# Patient Record
Sex: FEMALE | Race: WHITE | NOT HISPANIC OR LATINO | Employment: OTHER | URBAN - METROPOLITAN AREA
[De-identification: names, ages, dates, MRNs, and addresses within clinical notes are randomized per-mention and may not be internally consistent; named-entity substitution may affect disease eponyms.]

---

## 2017-12-05 NOTE — PROGRESS NOTES
"  SUBJECTIVE:   Caroline Pride is a 37 year old female who presents to clinic today for the following health issues:    Abnormal Mood Symptoms  Onset: \"all my life\"    Description:   Depression: no  Anxiety: YES    Accompanying Signs & Symptoms:  Still participating in activities that you used to enjoy: no  Fatigue: no  Irritability: YES  Difficulty concentrating: no  Changes in appetite: no  Problems with sleep: no  Heart racing/beating fast : no  Thoughts of hurting yourself or others: none    History:   Recent stress: YES- new baby born in January and wife gave birth  Prior depression hospitalization: None  Family history of depression: YES  Family history of anxiety: YES    Precipitating factors:   Alcohol/drug use: no  Mother-in-law moving in downstairs, wife and her have new baby and lot of work, 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 Sister and dad on medications that have helped them. One sister and one brother and sister is good support      Doesn't want pap or HPV testing , had intercourse once with one man when 17 and have had all normal paps, monogamous with wife for many years      Problem list and histories reviewed & adjusted, as indicated.  Additional history: as documented    Patient Active Problem List   Diagnosis     Other procreative management counseling and advice     GLORIA (generalized anxiety disorder)     History reviewed. No pertinent surgical history.    Social History   Substance Use Topics     Smoking status: Light Tobacco Smoker     Smokeless tobacco: Never Used " "    Alcohol use Yes      Comment: occasional     Family History   Problem Relation Age of Onset     Thyroid Disease Mother      Depression Father      Anxiety Disorder Father      Hypertension Sister      Anxiety Disorder Sister      Thyroid Disease Sister          No Known Allergies  BP Readings from Last 3 Encounters:   12/06/17 135/86   03/13/15 130/90    Wt Readings from Last 3 Encounters:   12/06/17 200 lb (90.7 kg)   03/13/15 189 lb (85.7 kg)                  Labs reviewed in EPIC        Reviewed and updated as needed this visit by clinical staffTobacco  Med Hx  Surg Hx  Fam Hx  Soc Hx      Reviewed and updated as needed this visit by Provider         ROS:  Constitutional, HEENT, cardiovascular, pulmonary, GI, , musculoskeletal, neuro, skin, endocrine and psych systems are negative, except as otherwise noted.      OBJECTIVE:   /86 (BP Location: Left arm, Patient Position: Chair, Cuff Size: Adult Regular)  Pulse 86  Temp 97.3  F (36.3  C) (Oral)  Ht 5' 7.9\" (1.725 m)  Wt 200 lb (90.7 kg)  SpO2 96%  BMI 30.5 kg/m2  Body mass index is 30.5 kg/(m^2).  GENERAL: healthy, alert and no distress  EYES: Eyes grossly normal to inspection, PERRL and conjunctivae and sclerae normal  NECK: no adenopathy, no asymmetry, masses, or scars and thyroid normal to palpation  RESP: lungs clear to auscultation - no rales, rhonchi or wheezes  CV: regular rate and rhythm, normal S1 S2, no S3 or S4, no murmur, click or rub, no peripheral edema and peripheral pulses strong  ABDOMEN: soft, nontender, no hepatosplenomegaly, no masses and bowel sounds normal  MS: no gross musculoskeletal defects noted, no edema  SKIN: no suspicious lesions or rashes  NEURO: Normal strength and tone, mentation intact and speech normal  PSYCH: MENTAL STATUS EXAM  Appearance: appropriate  Attitude: cooperative  Behavior: normal  Eye Contact: normal  Speech: normal  Orientation: oriented to person , place, time and situation  Mood: states her " mood has been anxious   Affect: Normal/bright, slightly anxious at times  Thought Process: clear     Diagnostic Test Results:  Results for orders placed or performed in visit on 12/06/17   Comprehensive metabolic panel   Result Value Ref Range    Sodium 141 133 - 144 mmol/L    Potassium 4.2 3.4 - 5.3 mmol/L    Chloride 108 94 - 109 mmol/L    Carbon Dioxide 24 20 - 32 mmol/L    Anion Gap 9 3 - 14 mmol/L    Glucose 112 (H) 70 - 99 mg/dL    Urea Nitrogen 13 7 - 30 mg/dL    Creatinine 0.68 0.52 - 1.04 mg/dL    GFR Estimate >90 >60 mL/min/1.7m2    GFR Estimate If Black >90 >60 mL/min/1.7m2    Calcium 9.0 8.5 - 10.1 mg/dL    Bilirubin Total 0.4 0.2 - 1.3 mg/dL    Albumin 3.7 3.4 - 5.0 g/dL    Protein Total 8.0 6.8 - 8.8 g/dL    Alkaline Phosphatase 52 40 - 150 U/L    ALT 30 0 - 50 U/L    AST 4 0 - 45 U/L   TSH with free T4 reflex   Result Value Ref Range    TSH 6.10 (H) 0.40 - 4.00 mU/L   Vitamin D Deficiency   Result Value Ref Range    Vitamin D Deficiency screening 18 (L) 20 - 75 ug/L   T4 free   Result Value Ref Range    T4 Free 1.01 0.76 - 1.46 ng/dL       ASSESSMENT/PLAN:         ICD-10-CM    1. GLORIA (generalized anxiety disorder) F41.1 escitalopram (LEXAPRO) 5 MG tablet     Comprehensive metabolic panel     TSH with free T4 reflex     Vitamin D Deficiency     T4 free   2. Establishing care with new doctor, encounter for Z76.89    anxiety new GLORIA vs situational with new baby her wife gave birth to 2 months ago, mother in law living in home, normal exam but has not yet had lab work to rule out other causes, will do today and follow up as indicated.    Discussed the pathophysiology of anxiety episodes and the various symptoms seen associated with anxiety episodes.  Discussed possible triggers including fatigue, depression, stress, and chemicals such as alcohol, caffeine and certain drugs.  Discussed the treatment including an aerobic exercise program, adequate rest, and both rescue meds and maintenance meds. Opted to  start lexapro as this worked well for her sister, she declines counseling at this time. No need for rescue at this time    Follow up in 1 month or earlier if any concerns     Patient Instructions       Understanding Generalized Anxiety Disorder (GLORIA)  Anxiety can fill you with worry and fear. Sometimes anxiety is healthy. It alerts you to a potential threat and gets you to respond and take action. But for some people, anxiety gets so bad it causes problems in daily life. If you find yourself in a constant state of anxiety, you may have an anxiety disorder called generalized anxiety disorder (GLORIA). Speak with your healthcare provider or mental health professional to learn more. He or she can help.     What is generalized anxiety disorder?  With GLORIA, you might worry about money, your family and friends, work, or the world in general. You might not even be sure what you're anxious about. But whatever it is, you have an intense fear that the worst will happen. These feelings never really go away. In people age 65 and older, GLORIA is one of the most commonly diagnosed anxiety disorders.  Many times it occurs with depression. This constant worry affects your quality of life and makes it hard to function. GLORIA can cause physical symptoms, too.  What are common symptoms of generalized anxiety disorder?  People with GLORIA often think they have a physical illness. The disorder can cause symptoms, such as:    Muscle tension, especially in the neck and shoulders    Nausea and stomach problems    Frequent headaches    Feeling lightheaded    Restlessness, trouble sleeping    Feeling irritable and on edge all the time  How can generalized anxiety disorder be treated?  GLORIA can be treated with medicine or therapy (also called counseling), or both. Medicine helps to reduce symptoms, so you can continue with your daily routine. Therapy helps you understand the cause of your anxiety and learn how to manage it. Both forms of treatment help  you deal with problems that anxiety causes in your life. This helps you to be healthier and happier.  Date Last Reviewed: 5/1/2017 2000-2017 [employer]. [address] [place] [address], [place] [address]. All rights reserved. This information is not intended as a substitute for professional medical care. Always follow your healthcare professional's instructions.        Treating Anxiety Disorders with Therapy    If you have an anxiety disorder, you don t have to suffer anymore. Treatment is available. Therapy (also called counseling) is often a helpful treatment for anxiety disorders. With therapy, a specially trained professional (therapist) helps you face and learn to manage your anxiety. Therapy can be short-term or long-term depending on your needs. In some cases, medicine may also be prescribed with therapy. It may take time before you notice how much therapy is helping, but stick with it. With therapy, you can feel better.  Cognitive behavioral therapy (CBT)  Cognitive behavioral therapy (CBT) teaches you to manage anxiety. It does this by helping you understand how you think and act when you re anxious. Research has shown CBT to be a very effective treatment for anxiety disorders. How CBT is run is almost like a class. It involves homework and activities to build skills that teach you to cope with anxiety step by step. It can be done in a group or one-on-one, and often takes place for a set number of sessions. CBT has two main parts:    Cognitive therapy helps you identify the negative, irrational thoughts that occur with your anxiety. You ll learn to replace these with more positive, realistic thoughts.    Behavioral therapy helps you change how you react to anxiety. You ll learn coping skills and methods for relaxing to help you better deal with anxiety.  Other forms of therapy  Other therapy methods may work better for you than CBT. Or, you may move from CBT to another form of therapy as your  treatment needs change. This may mean meeting with a therapist by yourself or in a group. Therapy can also help you work through problems in your life, such as drug or alcohol dependence, that may be making your anxiety worse.  Getting better takes time  Therapy will help you feel better and teach you skills to help manage anxiety long term. But change doesn t happen right away. It takes a commitment from you. And treatment only works if you learn to face the causes of your anxiety. So, you might feel worse before you feel better. This can sometimes make it hard to stick with it. But remember: Therapy is a very effective treatment. The results will be well worth it.  Helping yourself  If anxiety is wearing you down, here are some things you can do to cope:    Check with your doctor and rule out any physical problems that may be causing the anxiety symptoms.    If an anxiety disorder is diagnosed, seek mental healthcare. This is an illness and it can respond to treatment. Most types of anxiety disorders will respond to talk therapy and medicine.    Educate yourself about anxiety disorders. Keep track of helpful online resources and books you can use during stressful periods.    Try stress management techniques such as meditation.    Consider online or in-person support groups.    Don t fight your feelings. Anxiety feeds itself. The more you worry about it, the worse it gets. Instead, try to identify what might have triggered your anxiety. Then try to put this threat in perspective.    Keep in mind that you can t control everything about a situation. Change what you can and let the rest take its course.    Exercise -- it s a great way to relieve tension and help your body feel relaxed.    Examine your life for stress, and try to find ways to reduce it.    Avoid caffeine and nicotine, which can make anxiety symptoms worse.    Fight the temptation to turn to alcohol or unprescribed drugs for relief. They only make  things worse in the long run.   Date Last Reviewed: 1/1/2017 2000-2017 [employer]. [address] [facility], [place] [address]. All rights reserved. This information is not intended as a substitute for professional medical care. Always follow your healthcare professional's instructions.            DANN Arce [facility]

## 2017-12-06 ENCOUNTER — OFFICE VISIT (OUTPATIENT)
Dept: FAMILY MEDICINE | Facility: CLINIC | Age: 37
End: 2017-12-06
Payer: COMMERCIAL

## 2017-12-06 VITALS
HEART RATE: 86 BPM | WEIGHT: 200 LBS | TEMPERATURE: 97.3 F | BODY MASS INDEX: 30.31 KG/M2 | DIASTOLIC BLOOD PRESSURE: 86 MMHG | HEIGHT: 68 IN | SYSTOLIC BLOOD PRESSURE: 135 MMHG | OXYGEN SATURATION: 96 %

## 2017-12-06 DIAGNOSIS — Z76.89 ESTABLISHING CARE WITH NEW DOCTOR, ENCOUNTER FOR: ICD-10-CM

## 2017-12-06 DIAGNOSIS — F41.1 GAD (GENERALIZED ANXIETY DISORDER): Primary | ICD-10-CM

## 2017-12-06 LAB
ALBUMIN SERPL-MCNC: 3.7 G/DL (ref 3.4–5)
ALP SERPL-CCNC: 52 U/L (ref 40–150)
ALT SERPL W P-5'-P-CCNC: 30 U/L (ref 0–50)
ANION GAP SERPL CALCULATED.3IONS-SCNC: 9 MMOL/L (ref 3–14)
AST SERPL W P-5'-P-CCNC: 4 U/L (ref 0–45)
BILIRUB SERPL-MCNC: 0.4 MG/DL (ref 0.2–1.3)
BUN SERPL-MCNC: 13 MG/DL (ref 7–30)
CALCIUM SERPL-MCNC: 9 MG/DL (ref 8.5–10.1)
CHLORIDE SERPL-SCNC: 108 MMOL/L (ref 94–109)
CO2 SERPL-SCNC: 24 MMOL/L (ref 20–32)
CREAT SERPL-MCNC: 0.68 MG/DL (ref 0.52–1.04)
GFR SERPL CREATININE-BSD FRML MDRD: >90 ML/MIN/1.7M2
GLUCOSE SERPL-MCNC: 112 MG/DL (ref 70–99)
POTASSIUM SERPL-SCNC: 4.2 MMOL/L (ref 3.4–5.3)
PROT SERPL-MCNC: 8 G/DL (ref 6.8–8.8)
SODIUM SERPL-SCNC: 141 MMOL/L (ref 133–144)
T4 FREE SERPL-MCNC: 1.01 NG/DL (ref 0.76–1.46)
TSH SERPL DL<=0.005 MIU/L-ACNC: 6.1 MU/L (ref 0.4–4)

## 2017-12-06 PROCEDURE — 82306 VITAMIN D 25 HYDROXY: CPT | Performed by: NURSE PRACTITIONER

## 2017-12-06 PROCEDURE — 36415 COLL VENOUS BLD VENIPUNCTURE: CPT | Performed by: NURSE PRACTITIONER

## 2017-12-06 PROCEDURE — 84443 ASSAY THYROID STIM HORMONE: CPT | Performed by: NURSE PRACTITIONER

## 2017-12-06 PROCEDURE — 80053 COMPREHEN METABOLIC PANEL: CPT | Performed by: NURSE PRACTITIONER

## 2017-12-06 PROCEDURE — 84439 ASSAY OF FREE THYROXINE: CPT | Performed by: NURSE PRACTITIONER

## 2017-12-06 PROCEDURE — 99203 OFFICE O/P NEW LOW 30 MIN: CPT | Performed by: NURSE PRACTITIONER

## 2017-12-06 RX ORDER — ESCITALOPRAM OXALATE 5 MG/1
5 TABLET ORAL DAILY
Qty: 60 TABLET | Refills: 1 | Status: SHIPPED | OUTPATIENT
Start: 2017-12-06 | End: 2018-01-09

## 2017-12-06 RX ORDER — METFORMIN HCL 500 MG
1000 TABLET, EXTENDED RELEASE 24 HR ORAL DAILY
COMMUNITY
Start: 2017-10-26 | End: 2019-10-08

## 2017-12-06 ASSESSMENT — ANXIETY QUESTIONNAIRES
3. WORRYING TOO MUCH ABOUT DIFFERENT THINGS: MORE THAN HALF THE DAYS
1. FEELING NERVOUS, ANXIOUS, OR ON EDGE: NEARLY EVERY DAY
7. FEELING AFRAID AS IF SOMETHING AWFUL MIGHT HAPPEN: SEVERAL DAYS
GAD7 TOTAL SCORE: 11
6. BECOMING EASILY ANNOYED OR IRRITABLE: MORE THAN HALF THE DAYS
2. NOT BEING ABLE TO STOP OR CONTROL WORRYING: MORE THAN HALF THE DAYS
5. BEING SO RESTLESS THAT IT IS HARD TO SIT STILL: NOT AT ALL

## 2017-12-06 ASSESSMENT — PATIENT HEALTH QUESTIONNAIRE - PHQ9
SUM OF ALL RESPONSES TO PHQ QUESTIONS 1-9: 5
5. POOR APPETITE OR OVEREATING: SEVERAL DAYS

## 2017-12-06 NOTE — NURSING NOTE
"Chief Complaint   Patient presents with     Anxiety       Initial /86 (BP Location: Left arm, Patient Position: Chair, Cuff Size: Adult Regular)  Pulse 86  Temp 97.3  F (36.3  C) (Oral)  Ht 5' 7.9\" (1.725 m)  Wt 200 lb (90.7 kg)  SpO2 96%  BMI 30.5 kg/m2 Estimated body mass index is 30.5 kg/(m^2) as calculated from the following:    Height as of this encounter: 5' 7.9\" (1.725 m).    Weight as of this encounter: 200 lb (90.7 kg).  Medication Reconciliation: complete     Jamaica Cloud CMA    "

## 2017-12-06 NOTE — PATIENT INSTRUCTIONS
Understanding Generalized Anxiety Disorder (GLORIA)  Anxiety can fill you with worry and fear. Sometimes anxiety is healthy. It alerts you to a potential threat and gets you to respond and take action. But for some people, anxiety gets so bad it causes problems in daily life. If you find yourself in a constant state of anxiety, you may have an anxiety disorder called generalized anxiety disorder (GLORIA). Speak with your healthcare provider or mental health professional to learn more. He or she can help.     What is generalized anxiety disorder?  With GLORIA, you might worry about money, your family and friends, work, or the world in general. You might not even be sure what you're anxious about. But whatever it is, you have an intense fear that the worst will happen. These feelings never really go away. In people age 65 and older, GLORIA is one of the most commonly diagnosed anxiety disorders.  Many times it occurs with depression. This constant worry affects your quality of life and makes it hard to function. GLORIA can cause physical symptoms, too.  What are common symptoms of generalized anxiety disorder?  People with GLORIA often think they have a physical illness. The disorder can cause symptoms, such as:    Muscle tension, especially in the neck and shoulders    Nausea and stomach problems    Frequent headaches    Feeling lightheaded    Restlessness, trouble sleeping    Feeling irritable and on edge all the time  How can generalized anxiety disorder be treated?  GLORIA can be treated with medicine or therapy (also called counseling), or both. Medicine helps to reduce symptoms, so you can continue with your daily routine. Therapy helps you understand the cause of your anxiety and learn how to manage it. Both forms of treatment help you deal with problems that anxiety causes in your life. This helps you to be healthier and happier.  Date Last Reviewed: 5/1/2017 2000-2017 [employer]. [address] [facility],  [place] [address]. All rights reserved. This information is not intended as a substitute for professional medical care. Always follow your healthcare professional's instructions.        Treating Anxiety Disorders with Therapy    If you have an anxiety disorder, you don t have to suffer anymore. Treatment is available. Therapy (also called counseling) is often a helpful treatment for anxiety disorders. With therapy, a specially trained professional (therapist) helps you face and learn to manage your anxiety. Therapy can be short-term or long-term depending on your needs. In some cases, medicine may also be prescribed with therapy. It may take time before you notice how much therapy is helping, but stick with it. With therapy, you can feel better.  Cognitive behavioral therapy (CBT)  Cognitive behavioral therapy (CBT) teaches you to manage anxiety. It does this by helping you understand how you think and act when you re anxious. Research has shown CBT to be a very effective treatment for anxiety disorders. How CBT is run is almost like a class. It involves homework and activities to build skills that teach you to cope with anxiety step by step. It can be done in a group or one-on-one, and often takes place for a set number of sessions. CBT has two main parts:    Cognitive therapy helps you identify the negative, irrational thoughts that occur with your anxiety. You ll learn to replace these with more positive, realistic thoughts.    Behavioral therapy helps you change how you react to anxiety. You ll learn coping skills and methods for relaxing to help you better deal with anxiety.  Other forms of therapy  Other therapy methods may work better for you than CBT. Or, you may move from CBT to another form of therapy as your treatment needs change. This may mean meeting with a therapist by yourself or in a group. Therapy can also help you work through problems in your life, such as drug or alcohol dependence, that may be  making your anxiety worse.  Getting better takes time  Therapy will help you feel better and teach you skills to help manage anxiety long term. But change doesn t happen right away. It takes a commitment from you. And treatment only works if you learn to face the causes of your anxiety. So, you might feel worse before you feel better. This can sometimes make it hard to stick with it. But remember: Therapy is a very effective treatment. The results will be well worth it.  Helping yourself  If anxiety is wearing you down, here are some things you can do to cope:    Check with your doctor and rule out any physical problems that may be causing the anxiety symptoms.    If an anxiety disorder is diagnosed, seek mental healthcare. This is an illness and it can respond to treatment. Most types of anxiety disorders will respond to talk therapy and medicine.    Educate yourself about anxiety disorders. Keep track of helpful online resources and books you can use during stressful periods.    Try stress management techniques such as meditation.    Consider online or in-person support groups.    Don t fight your feelings. Anxiety feeds itself. The more you worry about it, the worse it gets. Instead, try to identify what might have triggered your anxiety. Then try to put this threat in perspective.    Keep in mind that you can t control everything about a situation. Change what you can and let the rest take its course.    Exercise   it s a great way to relieve tension and help your body feel relaxed.    Examine your life for stress, and try to find ways to reduce it.    Avoid caffeine and nicotine, which can make anxiety symptoms worse.    Fight the temptation to turn to alcohol or unprescribed drugs for relief. They only make things worse in the long run.   Date Last Reviewed: 1/1/2017 2000-2017 [employer]. [address] [place] [address], [place] [address]. All rights reserved. This information is not intended as a  substitute for professional medical care. Always follow your healthcare professional's instructions.

## 2017-12-06 NOTE — MR AVS SNAPSHOT
After Visit Summary   12/6/2017    Caroline Pride    MRN: [id number]           Patient Information     Date Of Birth          1980        Visit Information        Provider Department      12/6/2017 7:40 AM Giovanna Mistry APRN [facility]        Today's Diagnoses     GLORIA (generalized anxiety disorder)    -  1    Establishing care with new doctor, encounter for          Care Instructions      Understanding Generalized Anxiety Disorder (GLORIA)  Anxiety can fill you with worry and fear. Sometimes anxiety is healthy. It alerts you to a potential threat and gets you to respond and take action. But for some people, anxiety gets so bad it causes problems in daily life. If you find yourself in a constant state of anxiety, you may have an anxiety disorder called generalized anxiety disorder (GLORIA). Speak with your healthcare provider or mental health professional to learn more. He or she can help.     What is generalized anxiety disorder?  With GLORIA, you might worry about money, your family and friends, work, or the world in general. You might not even be sure what you're anxious about. But whatever it is, you have an intense fear that the worst will happen. These feelings never really go away. In people age 65 and older, GLORIA is one of the most commonly diagnosed anxiety disorders.  Many times it occurs with depression. This constant worry affects your quality of life and makes it hard to function. GLORIA can cause physical symptoms, too.  What are common symptoms of generalized anxiety disorder?  People with GLORIA often think they have a physical illness. The disorder can cause symptoms, such as:    Muscle tension, especially in the neck and shoulders    Nausea and stomach problems    Frequent headaches    Feeling lightheaded    Restlessness, trouble sleeping    Feeling irritable and on edge all the time  How can generalized anxiety disorder be treated?  GLORIA can be treated with  medicine or therapy (also called counseling), or both. Medicine helps to reduce symptoms, so you can continue with your daily routine. Therapy helps you understand the cause of your anxiety and learn how to manage it. Both forms of treatment help you deal with problems that anxiety causes in your life. This helps you to be healthier and happier.  Date Last Reviewed: 5/1/2017 2000-2017 [employer]. [address] [place] [address], [place] [address]. All rights reserved. This information is not intended as a substitute for professional medical care. Always follow your healthcare professional's instructions.        Treating Anxiety Disorders with Therapy    If you have an anxiety disorder, you don t have to suffer anymore. Treatment is available. Therapy (also called counseling) is often a helpful treatment for anxiety disorders. With therapy, a specially trained professional (therapist) helps you face and learn to manage your anxiety. Therapy can be short-term or long-term depending on your needs. In some cases, medicine may also be prescribed with therapy. It may take time before you notice how much therapy is helping, but stick with it. With therapy, you can feel better.  Cognitive behavioral therapy (CBT)  Cognitive behavioral therapy (CBT) teaches you to manage anxiety. It does this by helping you understand how you think and act when you re anxious. Research has shown CBT to be a very effective treatment for anxiety disorders. How CBT is run is almost like a class. It involves homework and activities to build skills that teach you to cope with anxiety step by step. It can be done in a group or one-on-one, and often takes place for a set number of sessions. CBT has two main parts:    Cognitive therapy helps you identify the negative, irrational thoughts that occur with your anxiety. You ll learn to replace these with more positive, realistic thoughts.    Behavioral therapy helps you change how you react to  anxiety. You ll learn coping skills and methods for relaxing to help you better deal with anxiety.  Other forms of therapy  Other therapy methods may work better for you than CBT. Or, you may move from CBT to another form of therapy as your treatment needs change. This may mean meeting with a therapist by yourself or in a group. Therapy can also help you work through problems in your life, such as drug or alcohol dependence, that may be making your anxiety worse.  Getting better takes time  Therapy will help you feel better and teach you skills to help manage anxiety long term. But change doesn t happen right away. It takes a commitment from you. And treatment only works if you learn to face the causes of your anxiety. So, you might feel worse before you feel better. This can sometimes make it hard to stick with it. But remember: Therapy is a very effective treatment. The results will be well worth it.  Helping yourself  If anxiety is wearing you down, here are some things you can do to cope:    Check with your doctor and rule out any physical problems that may be causing the anxiety symptoms.    If an anxiety disorder is diagnosed, seek mental healthcare. This is an illness and it can respond to treatment. Most types of anxiety disorders will respond to talk therapy and medicine.    Educate yourself about anxiety disorders. Keep track of helpful online resources and books you can use during stressful periods.    Try stress management techniques such as meditation.    Consider online or in-person support groups.    Don t fight your feelings. Anxiety feeds itself. The more you worry about it, the worse it gets. Instead, try to identify what might have triggered your anxiety. Then try to put this threat in perspective.    Keep in mind that you can t control everything about a situation. Change what you can and let the rest take its course.    Exercise -- it s a great way to relieve tension and help your body feel  relaxed.    Examine your life for stress, and try to find ways to reduce it.    Avoid caffeine and nicotine, which can make anxiety symptoms worse.    Fight the temptation to turn to alcohol or unprescribed drugs for relief. They only make things worse in the long run.   Date Last Reviewed: 1/1/2017 2000-2017 [employer]. [address] [place] [address], [place] [address]. All rights reserved. This information is not intended as a substitute for professional medical care. Always follow your healthcare professional's instructions.                Follow-ups after your visit        Your next 10 appointments already scheduled     Jan 09, 2018  4:40 PM CST   Office Visit with DANN Arce CNP   [facility] ([facility])    [address] [place] [id number] [phone]           Bring a current list of meds and any records pertaining to this visit. For Physicals, please bring immunization records and any forms needing to be filled out. Please arrive 10 minutes early to complete paperwork.              Who to contact     If you have questions or need follow up information about today's clinic visit or your schedule please contact [facility] directly at [phone].  Normal or non-critical lab and imaging results will be communicated to you by MyChart, letter or phone within 4 business days after the clinic has received the results. If you do not hear from us within 7 days, please contact the clinic through [employer] or phone. If you have a critical or abnormal lab result, we will notify you by phone as soon as possible.  Submit refill requests through [employer] or call your pharmacy and they will forward the refill request to us. Please allow 3 business days for your refill to be completed.          Additional Information About Your Visit        [employer] Information     [employer] gives you secure access to your electronic health record.  "If you see a primary care provider, you can also send messages to your care team and make appointments. If you have questions, please call your primary care clinic.  If you do not have a primary care provider, please call [phone] and they will assist you.        Care EveryWhere ID     This is your Care EveryWhere ID. This could be used by other organizations to access your [facility] medical records  [id number]        Your Vitals Were     Pulse Temperature Height Pulse Oximetry BMI (Body Mass Index)       86 97.3  F (36.3  C) (Oral) 5' 7.9\" (1.725 m) 96% 30.5 kg/m2        Blood Pressure from Last 3 Encounters:   12/06/17 135/86   03/13/15 130/90    Weight from Last 3 Encounters:   12/06/17 200 lb (90.7 kg)   03/13/15 189 lb (85.7 kg)              We Performed the Following     Comprehensive metabolic panel     T4 free     TSH with free T4 reflex     Vitamin D Deficiency          Today's Medication Changes          These changes are accurate as of: 12/6/17 11:59 PM.  If you have any questions, ask your nurse or doctor.               Start taking these medicines.        Dose/Directions    escitalopram 5 MG tablet   Commonly known as:  LEXAPRO   Used for:  GLORIA (generalized anxiety disorder)        Dose:  5 mg   Take 1 tablet (5 mg) by mouth daily And if tolerated after 1 week increase to 10 mg po daily   Quantity:  60 tablet   Refills:  1            Where to get your medicines      These medications were sent to [facility] [address]  [address] [place] [address] [address] [facility] [address]     Phone:  [phone]     escitalopram 5 MG tablet                Primary Care Provider Office Phone # Fax #    DANN Arce [phone]       [address]  [facility] [address]        Equal Access to Services     EUGENIO MACE AH: Kae Salgado, justino hanley, qaybta luciano carroll, karely banuelos " ladebbi hubbard. So [facility] [phone].    ATENCIÓN: Si habla nav, tiene a gomes disposición servicios gratuitos de asistencia lingüística. Rehan al [phone].    We comply with applicable federal civil rights laws and [place] laws. We do not discriminate on the basis of race, color, national origin, age, disability, sex, sexual orientation, or gender identity.            Thank you!     Thank you for choosing [facility]  for your care. Our goal is always to provide you with excellent care. Hearing back from our patients is one way we can continue to improve our services. Please take a few minutes to complete the written survey that you may receive in the mail after your visit with us. Thank you!             Your Updated Medication List - Protect others around you: Learn how to safely use, store and throw away your medicines at www.disposemymeds.org.          This list is accurate as of: 12/6/17 11:59 PM.  Always use your most recent med list.                   Brand Name Dispense Instructions for use Diagnosis    escitalopram 5 MG tablet    LEXAPRO    60 tablet    Take 1 tablet (5 mg) by mouth daily And if tolerated after 1 week increase to 10 mg po daily    GLORIA (generalized anxiety disorder)       metFORMIN 500 MG 24 hr tablet    GLUCOPHAGE-XR     Take 1,000 mg by mouth daily        prenatal multivitamin plus iron 27-0.8 MG Tabs per tablet      Take 1 tablet by mouth daily

## 2017-12-06 NOTE — Clinical Note
I'm just curious if this needs a time statement when ruling out other causes of depression or anxiety? Thanks

## 2017-12-07 LAB — DEPRECATED CALCIDIOL+CALCIFEROL SERPL-MC: 18 UG/L (ref 20–75)

## 2017-12-07 ASSESSMENT — ANXIETY QUESTIONNAIRES: GAD7 TOTAL SCORE: 11

## 2017-12-24 ENCOUNTER — HEALTH MAINTENANCE LETTER (OUTPATIENT)
Age: 37
End: 2017-12-24

## 2018-01-09 ENCOUNTER — MYC MEDICAL ADVICE (OUTPATIENT)
Dept: FAMILY MEDICINE | Facility: CLINIC | Age: 38
End: 2018-01-09

## 2018-01-09 ENCOUNTER — OFFICE VISIT (OUTPATIENT)
Dept: FAMILY MEDICINE | Facility: CLINIC | Age: 38
End: 2018-01-09
Payer: COMMERCIAL

## 2018-01-09 VITALS
BODY MASS INDEX: 30.19 KG/M2 | HEART RATE: 82 BPM | SYSTOLIC BLOOD PRESSURE: 130 MMHG | WEIGHT: 199.2 LBS | DIASTOLIC BLOOD PRESSURE: 86 MMHG | TEMPERATURE: 98.1 F | OXYGEN SATURATION: 98 % | HEIGHT: 68 IN

## 2018-01-09 DIAGNOSIS — E03.8 SUBCLINICAL HYPOTHYROIDISM: ICD-10-CM

## 2018-01-09 DIAGNOSIS — F41.1 GAD (GENERALIZED ANXIETY DISORDER): ICD-10-CM

## 2018-01-09 DIAGNOSIS — F41.1 GAD (GENERALIZED ANXIETY DISORDER): Primary | ICD-10-CM

## 2018-01-09 DIAGNOSIS — E55.9 VITAMIN D DEFICIENCY: ICD-10-CM

## 2018-01-09 PROCEDURE — 99213 OFFICE O/P EST LOW 20 MIN: CPT | Performed by: NURSE PRACTITIONER

## 2018-01-09 RX ORDER — ERGOCALCIFEROL 1.25 MG/1
50000 CAPSULE, LIQUID FILLED ORAL
Qty: 8 CAPSULE | Refills: 0 | Status: SHIPPED | OUTPATIENT
Start: 2018-01-09 | End: 2018-02-28

## 2018-01-09 NOTE — MR AVS SNAPSHOT
After Visit Summary   1/9/2018    Caroline Pried    MRN: [id number]           Patient Information     Date Of Birth          1980        Visit Information        Provider Department      1/9/2018 4:40 PM Giovanna Mistry APRN [facility]        Today's Diagnoses     GLORIA (generalized anxiety disorder)    -  1    Vitamin D deficiency        Subclinical hypothyroidism          Care Instructions      If you ever do wish to start counseling just let mek now and I'll refer you for this, otherwise consider exercise or weight loss groups in the community or at gym to get moving more each day  We will recheck how this is working for you and do lab work in about 3 months to include thyroid testing   Start the high dose Vitamin D weekly for 8 weeks, then do 5000 units daily afterward that is finished.   Hypothyroidism       You have hypothyroidism. This means your thyroid gland is not making enough thyroid hormone. This hormone is vital to body growth and metabolism. If you don t make enough, many body processes slow down. This can cause symptoms throughout the body. Hypothyroidism can range from mild to severe. The most severe form is called myxedema.  There are a number of causes of hypothyroidism. A common cause is Hashimoto s disease. This disease causes the body s own immune system to attack the thyroid gland. When you have certain treatments, such as surgery to remove the thyroid gland, this can also cause hypothyroidism.  Symptoms of hypothyroidism can include:    Fatigue    Trouble concentrating or thinking clearly; forgetfulness    Dry skin    Hair loss    Weight gain    Low tolerance to cold    Constipation    Depression    Personality changes    Tingling or prickling of the hands or feet    Heavy, absent, or irregular periods (women only)  Older adults may sometimes have other symptoms. These can include:    Muscle aches and weakness    Confusion    Incontinence (unable  to control urine or stool)    Trouble moving around    Falling  Treatment for hypothyroidism involves taking thyroid hormone pills daily. These pills replace the hormone your thyroid doesn t make. You will likely need to take a daily pill for the rest of your life. Tips for taking this medicine are given below.  Home care  Tips for taking your medicine    Take your thyroid hormone pills as prescribed by your healthcare provider. This is most often 1 pill a day on an empty stomach. Use a pillbox labeled with the days of the week. This will help you remember to take your pill each day.    Don t take products that contain iron and calcium or antacids within 4 hours of taking your thyroid hormone pills.    Don t take other medicines with your thyroid hormone pill without checking with your provider first.    Tell your provider if you have any side effects from your medicines that bother you.    Never change the dosage or stop taking your thyroid pills without talking to your provider first.  General care    Always talk with your provider before trying other medicines or treatments for your thyroid problem.    If you see other healthcare providers, be sure to let them know about your thyroid problem.  Follow-up care  See your healthcare provider for checkups as advised. You may need regular tests to check the level of thyroid hormone in your blood.  When to seek medical advice  Call your healthcare provider right away if any of these occur:    New symptoms develop    Symptoms return, continue, or worsen even after treatment    Extreme fatigue    Puffy hands, face, or feet    Fast or irregular heartbeat    Confusion  Call 911  Call 911 right away if any of these occur:    Fainting    Chest pain    Shortness of breath or trouble breathing  Date Last Reviewed: 8/24/2015 2000-2017 [employer]. [address] [place] [address], [place] [address]. All rights reserved. This information is not intended as a substitute for  professional medical care. Always follow your healthcare professional's instructions.        Vitamin D  Does this test have other names?  25-hydroxyvitamin D (25-high-DROX-ee-VIE-tuh-min D), 25(OH)D  What is this test?  Vitamin D is mainly found in fortified dairy foods, juice, breakfast cereal, and certain fish. This vitamin plays many roles in the body. But because it helps the body absorb calcium from foods and supplements, it's particularly important for bone health. Vitamin D has many additional roles in the body.  Vitamin D comes in several forms. When ultraviolet light, such as sunlight, hits your skin, it creates vitamin D3. D2 is used to fortify dairy foods. Both of these are further processed by your liver and kidneys into a form your body can use. Most tests for vitamin D check the level of a form circulating in the body called 25-hydroxyvitamin D, also called 25(OH)D.   Why do I need this test?  Vitamin D testing has become much more popular in recent years. Your healthcare provider may check your vitamin D levels to find out if you have any risks to bone health. These might be:    Low calcium    Soft bones caused by low vitamin D or problems using it (osteomalacia)    Osteopenia    Osteoporosis    Rickets, in children  You may also need this test if you are at risk for low vitamin D levels. Risks include:    Being an older adult    Having difficulty absorbing fat from your diet    Having chronic kidney disease    Have dark skin pigmentation    Being a  baby  Vitamin D has many effects in the body. You may need this test to help your provider diagnose or treat:    Problems with the parathyroid gland    Cancer    Autoimmune diseases such as multiple sclerosis and Crohn's disease    Psoriasis    Asthma    Weakness or falls    What other tests might I have along with this test?  A healthcare provider may also want to check your parathyroid hormone levels and your calcium levels.   What do my test  results mean?  A result for a lab test may be affected by many things, including the method the laboratory uses to do the test. If your test results are different from the normal value, you may not have a problem. To learn what the results mean for you, talk with your healthcare provider.  Children and adults need more than 30 nanograms per milliliter (ng/ml) of vitamin D. The optimal level of 25(OH)D is usually between 30 and 60 ng/mL. Recommended daily amounts range from 400 to 800 international units (IU) per day based on your age.  Levels lower than normal can mean you are:    Not making enough vitamin D on your own    Not getting enough vitamin D in your diet    Not absorbing vitamin D from your food as you should  Lower levels may also mean that your body is not converting the vitamin as it should. This might be because of kidney or liver disease.  Above-normal levels may be a sign that you're taking too much in supplement form.   How is this test done?  The test requires a blood sample, which is drawn through a needle from a vein in your arm.  Does this test pose any risks?  Taking a blood sample with a needle carries risks that include bleeding, infection, bruising, and a sense of lightheadedness. When the needle pricks your arm, you may feel a slight stinging sensation or pain. Afterward, the site may be slightly sore.   What might affect my test results?  The amount of time you spend in the sunlight, your diet, and whether you take vitamin D in supplement form can affect your vitamin D levels. Ask your healthcare provider if any health conditions you have or medicines you take could affect your results.  How do I get ready for this test?  Tell your healthcare provider if you take vitamin D supplements. Also be sure your provider knows about all medicines, herbs, vitamins, and supplements you are taking. This includes medicines that don't need a prescription and any illicit drugs you may use.     [id number]  "[employer]. [address] [place] [address], [place] [address]. All rights reserved. This information is not intended as a substitute for professional medical care. Always follow your healthcare professional's instructions.                Follow-ups after your visit        Who to contact     If you have questions or need follow up information about today's clinic visit or your schedule please contact [facility] directly at [phone].  Normal or non-critical lab and imaging results will be communicated to you by [employer], letter or phone within 4 business days after the clinic has received the results. If you do not hear from us within 7 days, please contact the clinic through [employer] or phone. If you have a critical or abnormal lab result, we will notify you by phone as soon as possible.  Submit refill requests through [employer] or call your pharmacy and they will forward the refill request to us. Please allow 3 business days for your refill to be completed.          Additional Information About Your Visit        [employer] Information     [employer] gives you secure access to your electronic health record. If you see a primary care provider, you can also send messages to your care team and make appointments. If you have questions, please call your primary care clinic.  If you do not have a primary care provider, please call [phone] and they will assist you.        Care EveryWhere ID     This is your Care EveryWhere ID. This could be used by other organizations to access your [place] medical records  [id number]        Your Vitals Were     Pulse Temperature Height Pulse Oximetry BMI (Body Mass Index)       82 98.1  F (36.7  C) (Oral) 5' 7.72\" (1.72 m) 98% 30.54 kg/m2        Blood Pressure from Last 3 Encounters:   01/09/18 130/86   12/06/17 135/86   03/13/15 130/90    Weight from Last 3 Encounters:   01/09/18 199 lb 3.2 oz (90.4 kg)   12/06/17 200 lb (90.7 kg)   03/13/15 189 lb (85.7 kg)    "           Today, you had the following     No orders found for display         Today's Medication Changes          These changes are accurate as of: 1/9/18 11:59 PM.  If you have any questions, ask your nurse or doctor.               Start taking these medicines.        Dose/Directions    vitamin D 95717 UNIT capsule   Commonly known as:  ERGOCALCIFEROL   Used for:  Vitamin D deficiency   Started by:  Giovanna Mistry APRN CNP        Dose:  88792 Units   Take 1 capsule (50,000 Units) by mouth every 7 days for 8 doses   Quantity:  8 capsule   Refills:  0         These medicines have changed or have updated prescriptions.        Dose/Directions    escitalopram 10 MG tablet   Commonly known as:  LEXAPRO   This may have changed:    - medication strength  - how much to take  - additional instructions   Used for:  GLORIA (generalized anxiety disorder)   Changed by:  Giovanna Mistry APRN CNP        Dose:  10 mg   Take 1 tablet (10 mg) by mouth daily   Quantity:  90 tablet   Refills:  1            Where to get your medicines      These medications were sent to [facility] [address] [place] [address]., [address] [address]     Phone:  [phone]     escitalopram 10 MG tablet    vitamin D 59982 UNIT capsule                Primary Care Provider Office Phone # Fax #    DANN Arce [phone]       [address]  [facility] [address]        Equal Access to Services     EUGENIO MACE AH: [place] suo Sojavon, waaxda luqadaha, qaybta kaalmada glen, karely hubbard. So [facility] [phone].    ATENCIÓN: Si habla español, tiene a gomes disposición servicios gratuitos de asistencia lingüística. Rehan al [phone].    We comply with applicable federal civil rights laws and [place] laws. We do not discriminate on the basis of race, color, national origin, age, disability, sex, sexual  orientation, or gender identity.            Thank you!     Thank you for choosing [facility]  for your care. Our goal is always to provide you with excellent care. Hearing back from our patients is one way we can continue to improve our services. Please take a few minutes to complete the written survey that you may receive in the mail after your visit with us. Thank you!             Your Updated Medication List - Protect others around you: Learn how to safely use, store and throw away your medicines at www.disposemymeds.org.          This list is accurate as of: 1/9/18 11:59 PM.  Always use your most recent med list.                   Brand Name Dispense Instructions for use Diagnosis    escitalopram 10 MG tablet    LEXAPRO    90 tablet    Take 1 tablet (10 mg) by mouth daily    GLORIA (generalized anxiety disorder)       metFORMIN 500 MG 24 hr tablet    GLUCOPHAGE-XR     Take 1,000 mg by mouth daily        prenatal multivitamin plus iron 27-0.8 MG Tabs per tablet      Take 1 tablet by mouth daily        vitamin D 24213 UNIT capsule    ERGOCALCIFEROL    8 capsule    Take 1 capsule (50,000 Units) by mouth every 7 days for 8 doses    Vitamin D deficiency

## 2018-01-09 NOTE — PROGRESS NOTES
SUBJECTIVE:   Caroline Pride is a 37 year old female who presents to clinic today for the following health issues:    Anxiety Follow-Up    Status since last visit: Improved     Other associated symptoms: really tired and felt crazy taking it the first time     Complicating factors:   Significant life event: No   Current substance abuse: None  Depression symptoms: No  GLORIA-7 SCORE 12/6/2017   Total Score 11       GAD7      Amount of exercise or physical activity: None    Problems taking medications regularly: No    Medication side effects: none    Diet: regular (no restrictions)    Questions/Concerns: none  Tired but after a couple weeks felt improvement   Does [employer] with baby at home and can be difficult to do both some days  Partner and sister supportive     TSH elevated, normal t4, has been overweight but thinks it's from not exercising and feeling sluggish to start. Mom had thyroid issues from mother getting treatment for cancer and she was made from her mom's egg so thinking this may have affected her as well     Vit D Def - Hasn't started Vit D high dose yet, taking 1000 units some days    Problem list and histories reviewed & adjusted, as indicated.  Additional history: as documented    Patient Active Problem List   Diagnosis     Other procreative management counseling and advice     GLORIA (generalized anxiety disorder)     History reviewed. No pertinent surgical history.    Social History   Substance Use Topics     Smoking status: Light Tobacco Smoker     Smokeless tobacco: Never Used     Alcohol use Yes      Comment: occasional     Family History   Problem Relation Age of Onset     Thyroid Disease Mother      Depression Father      Anxiety Disorder Father      Hypertension Sister      Anxiety Disorder Sister      Thyroid Disease Sister          Current Outpatient Prescriptions   Medication Sig Dispense Refill     vitamin D (ERGOCALCIFEROL) 97803 UNIT capsule Take 1 capsule (50,000 Units) by mouth every 7  "days for 8 doses 8 capsule 0     metFORMIN (GLUCOPHAGE-XR) 500 MG 24 hr tablet Take 1,000 mg by mouth daily       escitalopram (LEXAPRO) 5 MG tablet Take 1 tablet (5 mg) by mouth daily And if tolerated after 1 week increase to 10 mg po daily 60 tablet 1     Prenatal Vit-Fe Fumarate-FA (PRENATAL MULTIVITAMIN  PLUS IRON) 27-0.8 MG TABS Take 1 tablet by mouth daily       No Known Allergies  BP Readings from Last 3 Encounters:   01/09/18 130/86   12/06/17 135/86   03/13/15 130/90    Wt Readings from Last 3 Encounters:   01/09/18 199 lb 3.2 oz (90.4 kg)   12/06/17 200 lb (90.7 kg)   03/13/15 189 lb (85.7 kg)                  Labs reviewed in EPIC        Reviewed and updated as needed this visit by clinical staff     Reviewed and updated as needed this visit by Provider         ROS:  Constitutional, HEENT, cardiovascular, pulmonary, GI, , musculoskeletal, neuro, skin, endocrine and psych systems are negative, except as otherwise noted.      OBJECTIVE:   /86  Pulse 82  Temp 98.1  F (36.7  C) (Oral)  Ht 5' 7.72\" (1.72 m)  Wt 199 lb 3.2 oz (90.4 kg)  SpO2 98%  BMI 30.54 kg/m2  Body mass index is 30.54 kg/(m^2).  GENERAL: healthy, alert and no distress  EYES: Eyes grossly normal to inspection, PERRL and conjunctivae and sclerae normal  NECK: no adenopathy, no asymmetry, masses, or scars and thyroid normal to palpation  RESP: lungs clear to auscultation - no rales, rhonchi or wheezes  CV: regular rate and rhythm, normal S1 S2, no S3 or S4, no murmur, click or rub, no peripheral edema and peripheral pulses strong  ABDOMEN: soft, nontender, no hepatosplenomegaly, no masses and bowel sounds normal  MS: no gross musculoskeletal defects noted, no edema  SKIN: no suspicious lesions or rashes  NEURO: Normal strength and tone, mentation intact and speech normal  PSYCH: mentation appears normal, affect normal/bright    Diagnostic Test Results:  none     ASSESSMENT/PLAN:         ICD-10-CM    1. GLORIA (generalized anxiety " disorder) F41.1 escitalopram (LEXAPRO) 10 MG tablet   2. Vitamin D deficiency E55.9 vitamin D (ERGOCALCIFEROL) 69370 UNIT capsule   3. Subclinical hypothyroidism E03.9    reports starting the SSRI helped with her anxiety, fatigue continues and appears not very motivated to continue medication, explored this today and she does find it helpful so will continue at the 10 mg dose. Consider increasing, although she is not interested at this time. Denies depression, declines counseling    Will recheck thyroid and consider rechecking Vit D at next lab draw, see below. If fatigue is worsening Return to Clinic and would check sooner    Patient Instructions     If you ever do wish to start counseling just let mek now and I'll refer you for this, otherwise consider exercise or weight loss groups in the community or at gym to get moving more each day  We will recheck how this is working for you and do lab work in about 3 months to include thyroid testing   Start the high dose Vitamin D weekly for 8 weeks, then do 5000 units daily afterward that is finished.   Hypothyroidism       You have hypothyroidism. This means your thyroid gland is not making enough thyroid hormone. This hormone is vital to body growth and metabolism. If you don t make enough, many body processes slow down. This can cause symptoms throughout the body. Hypothyroidism can range from mild to severe. The most severe form is called myxedema.  There are a number of causes of hypothyroidism. A common cause is Hashimoto s disease. This disease causes the body s own immune system to attack the thyroid gland. When you have certain treatments, such as surgery to remove the thyroid gland, this can also cause hypothyroidism.  Symptoms of hypothyroidism can include:    Fatigue    Trouble concentrating or thinking clearly; forgetfulness    Dry skin    Hair loss    Weight gain    Low tolerance to cold    Constipation    Depression    Personality changes    Tingling or  prickling of the hands or feet    Heavy, absent, or irregular periods (women only)  Older adults may sometimes have other symptoms. These can include:    Muscle aches and weakness    Confusion    Incontinence (unable to control urine or stool)    Trouble moving around    Falling  Treatment for hypothyroidism involves taking thyroid hormone pills daily. These pills replace the hormone your thyroid doesn t make. You will likely need to take a daily pill for the rest of your life. Tips for taking this medicine are given below.  Home care  Tips for taking your medicine    Take your thyroid hormone pills as prescribed by your healthcare provider. This is most often 1 pill a day on an empty stomach. Use a pillbox labeled with the days of the week. This will help you remember to take your pill each day.    Don t take products that contain iron and calcium or antacids within 4 hours of taking your thyroid hormone pills.    Don t take other medicines with your thyroid hormone pill without checking with your provider first.    Tell your provider if you have any side effects from your medicines that bother you.    Never change the dosage or stop taking your thyroid pills without talking to your provider first.  General care    Always talk with your provider before trying other medicines or treatments for your thyroid problem.    If you see other healthcare providers, be sure to let them know about your thyroid problem.  Follow-up care  See your healthcare provider for checkups as advised. You may need regular tests to check the level of thyroid hormone in your blood.  When to seek medical advice  Call your healthcare provider right away if any of these occur:    New symptoms develop    Symptoms return, continue, or worsen even after treatment    Extreme fatigue    Puffy hands, face, or feet    Fast or irregular heartbeat    Confusion  Call 911  Call 911 right away if any of these occur:    Fainting    Chest pain    Shortness  of breath or trouble breathing  Date Last Reviewed: 8/24/2015 2000-2017 [employer]. [address] [place] [address], [place] [address]. All rights reserved. This information is not intended as a substitute for professional medical care. Always follow your healthcare professional's instructions.        Vitamin D  Does this test have other names?  25-hydroxyvitamin D (25-high-DROX-ee-VIE-tuh-min D), 25(OH)D  What is this test?  Vitamin D is mainly found in fortified dairy foods, juice, breakfast cereal, and certain fish. This vitamin plays many roles in the body. But because it helps the body absorb calcium from foods and supplements, it's particularly important for bone health. Vitamin D has many additional roles in the body.  Vitamin D comes in several forms. When ultraviolet light, such as sunlight, hits your skin, it creates vitamin D3. D2 is used to fortify dairy foods. Both of these are further processed by your liver and kidneys into a form your body can use. Most tests for vitamin D check the level of a form circulating in the body called 25-hydroxyvitamin D, also called 25(OH)D.   Why do I need this test?  Vitamin D testing has become much more popular in recent years. Your healthcare provider may check your vitamin D levels to find out if you have any risks to bone health. These might be:    Low calcium    Soft bones caused by low vitamin D or problems using it (osteomalacia)    Osteopenia    Osteoporosis    Rickets, in children  You may also need this test if you are at risk for low vitamin D levels. Risks include:    Being an older adult    Having difficulty absorbing fat from your diet    Having chronic kidney disease    Have dark skin pigmentation    Being a  baby  Vitamin D has many effects in the body. You may need this test to help your provider diagnose or treat:    Problems with the parathyroid gland    Cancer    Autoimmune diseases such as multiple sclerosis and Crohn's  disease    Psoriasis    Asthma    Weakness or falls    What other tests might I have along with this test?  A healthcare provider may also want to check your parathyroid hormone levels and your calcium levels.   What do my test results mean?  A result for a lab test may be affected by many things, including the method the laboratory uses to do the test. If your test results are different from the normal value, you may not have a problem. To learn what the results mean for you, talk with your healthcare provider.  Children and adults need more than 30 nanograms per milliliter (ng/ml) of vitamin D. The optimal level of 25(OH)D is usually between 30 and 60 ng/mL. Recommended daily amounts range from 400 to 800 international units (IU) per day based on your age.  Levels lower than normal can mean you are:    Not making enough vitamin D on your own    Not getting enough vitamin D in your diet    Not absorbing vitamin D from your food as you should  Lower levels may also mean that your body is not converting the vitamin as it should. This might be because of kidney or liver disease.  Above-normal levels may be a sign that you're taking too much in supplement form.   How is this test done?  The test requires a blood sample, which is drawn through a needle from a vein in your arm.  Does this test pose any risks?  Taking a blood sample with a needle carries risks that include bleeding, infection, bruising, and a sense of lightheadedness. When the needle pricks your arm, you may feel a slight stinging sensation or pain. Afterward, the site may be slightly sore.   What might affect my test results?  The amount of time you spend in the sunlight, your diet, and whether you take vitamin D in supplement form can affect your vitamin D levels. Ask your healthcare provider if any health conditions you have or medicines you take could affect your results.  How do I get ready for this test?  Tell your healthcare provider if you take  vitamin D supplements. Also be sure your provider knows about all medicines, herbs, vitamins, and supplements you are taking. This includes medicines that don't need a prescription and any illicit drugs you may use.     1090-9316 [employer]. [address] [place] [address], [place] [address]. All rights reserved. This information is not intended as a substitute for professional medical care. Always follow your healthcare professional's instructions.            DANN Arce [facility]

## 2018-01-09 NOTE — NURSING NOTE
"Chief Complaint   Patient presents with     Anxiety       Initial /86  Pulse 82  Temp 98.1  F (36.7  C) (Oral)  Ht 5' 7.72\" (1.72 m)  Wt 199 lb 3.2 oz (90.4 kg)  SpO2 98%  BMI 30.54 kg/m2 Estimated body mass index is 30.54 kg/(m^2) as calculated from the following:    Height as of this encounter: 5' 7.72\" (1.72 m).    Weight as of this encounter: 199 lb 3.2 oz (90.4 kg).  Medication Reconciliation: complete       Alban Cole MA      "

## 2018-01-09 NOTE — PATIENT INSTRUCTIONS
If you ever do wish to start counseling just let mek now and I'll refer you for this, otherwise consider exercise or weight loss groups in the community or at gym to get moving more each day  We will recheck how this is working for you and do lab work in about 3 months to include thyroid testing   Start the high dose Vitamin D weekly for 8 weeks, then do 5000 units daily afterward that is finished.   Hypothyroidism       You have hypothyroidism. This means your thyroid gland is not making enough thyroid hormone. This hormone is vital to body growth and metabolism. If you don t make enough, many body processes slow down. This can cause symptoms throughout the body. Hypothyroidism can range from mild to severe. The most severe form is called myxedema.  There are a number of causes of hypothyroidism. A common cause is Hashimoto s disease. This disease causes the body s own immune system to attack the thyroid gland. When you have certain treatments, such as surgery to remove the thyroid gland, this can also cause hypothyroidism.  Symptoms of hypothyroidism can include:    Fatigue    Trouble concentrating or thinking clearly; forgetfulness    Dry skin    Hair loss    Weight gain    Low tolerance to cold    Constipation    Depression    Personality changes    Tingling or prickling of the hands or feet    Heavy, absent, or irregular periods (women only)  Older adults may sometimes have other symptoms. These can include:    Muscle aches and weakness    Confusion    Incontinence (unable to control urine or stool)    Trouble moving around    Falling  Treatment for hypothyroidism involves taking thyroid hormone pills daily. These pills replace the hormone your thyroid doesn t make. You will likely need to take a daily pill for the rest of your life. Tips for taking this medicine are given below.  Home care  Tips for taking your medicine    Take your thyroid hormone pills as prescribed by your healthcare provider. This is  most often 1 pill a day on an empty stomach. Use a pillbox labeled with the days of the week. This will help you remember to take your pill each day.    Don t take products that contain iron and calcium or antacids within 4 hours of taking your thyroid hormone pills.    Don t take other medicines with your thyroid hormone pill without checking with your provider first.    Tell your provider if you have any side effects from your medicines that bother you.    Never change the dosage or stop taking your thyroid pills without talking to your provider first.  General care    Always talk with your provider before trying other medicines or treatments for your thyroid problem.    If you see other healthcare providers, be sure to let them know about your thyroid problem.  Follow-up care  See your healthcare provider for checkups as advised. You may need regular tests to check the level of thyroid hormone in your blood.  When to seek medical advice  Call your healthcare provider right away if any of these occur:    New symptoms develop    Symptoms return, continue, or worsen even after treatment    Extreme fatigue    Puffy hands, face, or feet    Fast or irregular heartbeat    Confusion  Call 911  Call 911 right away if any of these occur:    Fainting    Chest pain    Shortness of breath or trouble breathing  Date Last Reviewed: 8/24/2015 2000-2017 [employer]. [address] [place] [address], [place] [address]. All rights reserved. This information is not intended as a substitute for professional medical care. Always follow your healthcare professional's instructions.        Vitamin D  Does this test have other names?  25-hydroxyvitamin D (25-high-DROX-ee-VIE-tuh-min D), 25(OH)D  What is this test?  Vitamin D is mainly found in fortified dairy foods, juice, breakfast cereal, and certain fish. This vitamin plays many roles in the body. But because it helps the body absorb calcium from foods and supplements, it's  particularly important for bone health. Vitamin D has many additional roles in the body.  Vitamin D comes in several forms. When ultraviolet light, such as sunlight, hits your skin, it creates vitamin D3. D2 is used to fortify dairy foods. Both of these are further processed by your liver and kidneys into a form your body can use. Most tests for vitamin D check the level of a form circulating in the body called 25-hydroxyvitamin D, also called 25(OH)D.   Why do I need this test?  Vitamin D testing has become much more popular in recent years. Your healthcare provider may check your vitamin D levels to find out if you have any risks to bone health. These might be:    Low calcium    Soft bones caused by low vitamin D or problems using it (osteomalacia)    Osteopenia    Osteoporosis    Rickets, in children  You may also need this test if you are at risk for low vitamin D levels. Risks include:    Being an older adult    Having difficulty absorbing fat from your diet    Having chronic kidney disease    Have dark skin pigmentation    Being a  baby  Vitamin D has many effects in the body. You may need this test to help your provider diagnose or treat:    Problems with the parathyroid gland    Cancer    Autoimmune diseases such as multiple sclerosis and Crohn's disease    Psoriasis    Asthma    Weakness or falls    What other tests might I have along with this test?  A healthcare provider may also want to check your parathyroid hormone levels and your calcium levels.   What do my test results mean?  A result for a lab test may be affected by many things, including the method the laboratory uses to do the test. If your test results are different from the normal value, you may not have a problem. To learn what the results mean for you, talk with your healthcare provider.  Children and adults need more than 30 nanograms per milliliter (ng/ml) of vitamin D. The optimal level of 25(OH)D is usually between 30 and 60  ng/mL. Recommended daily amounts range from 400 to 800 international units (IU) per day based on your age.  Levels lower than normal can mean you are:    Not making enough vitamin D on your own    Not getting enough vitamin D in your diet    Not absorbing vitamin D from your food as you should  Lower levels may also mean that your body is not converting the vitamin as it should. This might be because of kidney or liver disease.  Above-normal levels may be a sign that you're taking too much in supplement form.   How is this test done?  The test requires a blood sample, which is drawn through a needle from a vein in your arm.  Does this test pose any risks?  Taking a blood sample with a needle carries risks that include bleeding, infection, bruising, and a sense of lightheadedness. When the needle pricks your arm, you may feel a slight stinging sensation or pain. Afterward, the site may be slightly sore.   What might affect my test results?  The amount of time you spend in the sunlight, your diet, and whether you take vitamin D in supplement form can affect your vitamin D levels. Ask your healthcare provider if any health conditions you have or medicines you take could affect your results.  How do I get ready for this test?  Tell your healthcare provider if you take vitamin D supplements. Also be sure your provider knows about all medicines, herbs, vitamins, and supplements you are taking. This includes medicines that don't need a prescription and any illicit drugs you may use.     7701-7573 [employer]. [address] [place] [address], [place] [address]. All rights reserved. This information is not intended as a substitute for professional medical care. Always follow your healthcare professional's instructions.

## 2018-01-16 RX ORDER — ESCITALOPRAM OXALATE 10 MG/1
10 TABLET ORAL DAILY
Qty: 90 TABLET | Refills: 1 | Status: SHIPPED | OUTPATIENT
Start: 2018-01-16 | End: 2018-01-30

## 2018-01-30 RX ORDER — ESCITALOPRAM OXALATE 5 MG/1
5 TABLET ORAL DAILY
Qty: 90 TABLET | Refills: 1 | Status: SHIPPED | OUTPATIENT
Start: 2018-01-30 | End: 2018-07-13

## 2018-07-13 ENCOUNTER — MYC REFILL (OUTPATIENT)
Dept: FAMILY MEDICINE | Facility: CLINIC | Age: 38
End: 2018-07-13

## 2018-07-13 DIAGNOSIS — F41.1 GAD (GENERALIZED ANXIETY DISORDER): ICD-10-CM

## 2018-07-13 RX ORDER — ESCITALOPRAM OXALATE 5 MG/1
5 TABLET ORAL DAILY
Qty: 90 TABLET | Refills: 1 | Status: SHIPPED | OUTPATIENT
Start: 2018-07-13 | End: 2019-10-08

## 2018-07-13 NOTE — TELEPHONE ENCOUNTER
Prescription approved per [facility] Refill Protocol.    Nancy Garcia RN  [facility]

## 2018-07-13 NOTE — TELEPHONE ENCOUNTER
Message from [employer]:  Original authorizing provider: DANN Arce CNP would like a refill of the following medications:  escitalopram (LEXAPRO) 5 MG tablet [DANN Arce CNP]    Preferred pharmacy: [address]    Comment:

## 2018-10-09 ENCOUNTER — TELEPHONE (OUTPATIENT)
Dept: FAMILY MEDICINE | Facility: CLINIC | Age: 38
End: 2018-10-09

## 2018-10-09 NOTE — TELEPHONE ENCOUNTER
Panel Management Review      Patient has the following on her problem list: None      Composite cancer screening  Chart review shows that this patient is due/due soon for the following Pap Smear  Summary:    Patient is due/failing the following:   PAP    Action needed:   Patient needs office visit for pap.    Type of outreach:    Sent [employer] message.    Questions for provider review:    none                                                                                                                                    Kaitlynn Mendez MA

## 2019-02-04 ENCOUNTER — TELEPHONE (OUTPATIENT)
Dept: FAMILY MEDICINE | Facility: CLINIC | Age: 39
End: 2019-02-04

## 2019-02-04 NOTE — LETTER
[facility]  [address] [place] [address] [address]  [facility] [address]  Phone: [phone]  Fax: [phone]              February 21, 2019      Caroline Pride  [address]  [facility] [address]        Dear Caroline     In order to ensure we are providing the best quality care, we have reviewed your chart and see that you are due for:     1. Pap smear     Please call the clinic at your earliest convenience to schedule an appointment.  If you have completed these please contact our office via phone at [phone] or [facility] to update our records.  We would like to know the date (approximately month and year), the result, and ideally where the procedure was performed.     Thank you for trusting us with your health care.       Sincerely,     Care Team for Giovanna Mistry NP

## 2019-02-04 NOTE — TELEPHONE ENCOUNTER
Panel Management Review      Patient has the following on her problem list: None      Composite cancer screening  Chart review shows that this patient is due/due soon for the following Pap Smear  Summary:    Patient is due/failing the following:   PAP    Action needed:   Patient needs office visit for pap smear.    Type of outreach:    Sent [employer] message.    Questions for provider review:    None                                                                                                                                    Kaitlynn Mendez MA       Chart routed to  .

## 2019-09-16 ENCOUNTER — TELEPHONE (OUTPATIENT)
Dept: OBGYN | Facility: CLINIC | Age: 39
End: 2019-09-16

## 2019-09-16 NOTE — TELEPHONE ENCOUNTER
Called and left message to call clinic back.   If call is returned, nurse intake visit can be scheduled.  Ruthann Mistry,        ----- Message from Gómez Clark sent at 9/15/2019 10:10 PM CDT -----  Patient is 7 weeks pregnant and would like to get her first prenatal appointment scheduled.

## 2019-10-08 ENCOUNTER — TRANSCRIBE ORDERS (OUTPATIENT)
Dept: MATERNAL FETAL MEDICINE | Facility: CLINIC | Age: 39
End: 2019-10-08

## 2019-10-08 ENCOUNTER — PRENATAL OFFICE VISIT (OUTPATIENT)
Dept: NURSING | Facility: CLINIC | Age: 39
End: 2019-10-08
Payer: COMMERCIAL

## 2019-10-08 VITALS
TEMPERATURE: 98 F | DIASTOLIC BLOOD PRESSURE: 81 MMHG | BODY MASS INDEX: 30.14 KG/M2 | HEART RATE: 84 BPM | SYSTOLIC BLOOD PRESSURE: 127 MMHG | WEIGHT: 198.9 LBS | HEIGHT: 68 IN

## 2019-10-08 DIAGNOSIS — O26.90 PREGNANCY RELATED CONDITION, ANTEPARTUM: Primary | ICD-10-CM

## 2019-10-08 DIAGNOSIS — O09.519 AMA (ADVANCED MATERNAL AGE) PRIMIGRAVIDA 35+: Primary | ICD-10-CM

## 2019-10-08 DIAGNOSIS — Z23 NEED FOR TDAP VACCINATION: ICD-10-CM

## 2019-10-08 LAB
ABO + RH BLD: NORMAL
ABO + RH BLD: NORMAL
ALBUMIN UR-MCNC: NEGATIVE MG/DL
APPEARANCE UR: CLEAR
BILIRUB UR QL STRIP: NEGATIVE
BLD GP AB SCN SERPL QL: NORMAL
BLOOD BANK CMNT PATIENT-IMP: NORMAL
COLOR UR AUTO: YELLOW
ERYTHROCYTE [DISTWIDTH] IN BLOOD BY AUTOMATED COUNT: 12.7 % (ref 10–15)
GLUCOSE UR STRIP-MCNC: NEGATIVE MG/DL
HCT VFR BLD AUTO: 34.9 % (ref 35–47)
HGB BLD-MCNC: 11.9 G/DL (ref 11.7–15.7)
HGB UR QL STRIP: NEGATIVE
KETONES UR STRIP-MCNC: NEGATIVE MG/DL
LEUKOCYTE ESTERASE UR QL STRIP: NEGATIVE
MCH RBC QN AUTO: 30.4 PG (ref 26.5–33)
MCHC RBC AUTO-ENTMCNC: 34.1 G/DL (ref 31.5–36.5)
MCV RBC AUTO: 89 FL (ref 78–100)
NITRATE UR QL: NEGATIVE
PH UR STRIP: 6 PH (ref 5–7)
PLATELET # BLD AUTO: 309 10E9/L (ref 150–450)
RBC # BLD AUTO: 3.92 10E12/L (ref 3.8–5.2)
SOURCE: NORMAL
SP GR UR STRIP: 1.02 (ref 1–1.03)
SPECIMEN EXP DATE BLD: NORMAL
UROBILINOGEN UR STRIP-ACNC: 0.2 EU/DL (ref 0.2–1)
WBC # BLD AUTO: 10.1 10E9/L (ref 4–11)

## 2019-10-08 PROCEDURE — 87086 URINE CULTURE/COLONY COUNT: CPT | Performed by: ADVANCED PRACTICE MIDWIFE

## 2019-10-08 PROCEDURE — 87389 HIV-1 AG W/HIV-1&-2 AB AG IA: CPT | Performed by: ADVANCED PRACTICE MIDWIFE

## 2019-10-08 PROCEDURE — 99207 ZZC NO CHARGE NURSE ONLY: CPT

## 2019-10-08 PROCEDURE — 86900 BLOOD TYPING SEROLOGIC ABO: CPT | Performed by: ADVANCED PRACTICE MIDWIFE

## 2019-10-08 PROCEDURE — 85027 COMPLETE CBC AUTOMATED: CPT | Performed by: ADVANCED PRACTICE MIDWIFE

## 2019-10-08 PROCEDURE — 86780 TREPONEMA PALLIDUM: CPT | Performed by: ADVANCED PRACTICE MIDWIFE

## 2019-10-08 PROCEDURE — 81003 URINALYSIS AUTO W/O SCOPE: CPT | Performed by: ADVANCED PRACTICE MIDWIFE

## 2019-10-08 PROCEDURE — 86901 BLOOD TYPING SEROLOGIC RH(D): CPT | Performed by: ADVANCED PRACTICE MIDWIFE

## 2019-10-08 PROCEDURE — 84443 ASSAY THYROID STIM HORMONE: CPT | Performed by: ADVANCED PRACTICE MIDWIFE

## 2019-10-08 PROCEDURE — 87340 HEPATITIS B SURFACE AG IA: CPT | Performed by: ADVANCED PRACTICE MIDWIFE

## 2019-10-08 PROCEDURE — 86850 RBC ANTIBODY SCREEN: CPT | Performed by: ADVANCED PRACTICE MIDWIFE

## 2019-10-08 PROCEDURE — 82306 VITAMIN D 25 HYDROXY: CPT | Performed by: ADVANCED PRACTICE MIDWIFE

## 2019-10-08 PROCEDURE — 36415 COLL VENOUS BLD VENIPUNCTURE: CPT | Performed by: ADVANCED PRACTICE MIDWIFE

## 2019-10-08 PROCEDURE — 86762 RUBELLA ANTIBODY: CPT | Performed by: ADVANCED PRACTICE MIDWIFE

## 2019-10-08 SDOH — SOCIAL STABILITY: SOCIAL INSECURITY
WITHIN THE LAST YEAR, HAVE YOU BEEN KICKED, HIT, SLAPPED, OR OTHERWISE PHYSICALLY HURT BY YOUR PARTNER OR EX-PARTNER?: NO

## 2019-10-08 SDOH — SOCIAL STABILITY: SOCIAL INSECURITY: WITHIN THE LAST YEAR, HAVE YOU BEEN AFRAID OF YOUR PARTNER OR EX-PARTNER?: NO

## 2019-10-08 SDOH — SOCIAL STABILITY: SOCIAL INSECURITY
WITHIN THE LAST YEAR, HAVE TO BEEN RAPED OR FORCED TO HAVE ANY KIND OF SEXUAL ACTIVITY BY YOUR PARTNER OR EX-PARTNER?: NO

## 2019-10-08 SDOH — SOCIAL STABILITY: SOCIAL INSECURITY: WITHIN THE LAST YEAR, HAVE YOU BEEN HUMILIATED OR EMOTIONALLY ABUSED IN OTHER WAYS BY YOUR PARTNER OR EX-PARTNER?: NO

## 2019-10-08 ASSESSMENT — MIFFLIN-ST. JEOR: SCORE: 1621.73

## 2019-10-08 NOTE — PROGRESS NOTES
Important Information for Provider:     Patient presents for new ob teaching and labs, first pregnancy,AMA. Ordered first trimester screening. Handouts reviewed and given. Recommended B6, Unisom for nausea.  TSH /Vitamin Ddrawn today with NOB labs.    Prenatal OB Questionnaire  Patient supplied answers from flow sheet for:  Prenatal OB Questionnaire.  Past Medical History  Diabetes?: No  Hypertension : No  Heart disease, mitral valve prolapse or rheumatic fever?: No  An autoimmune disease such as lupus or rheumatoid arthritis?: No  Kidney disease or urinary tract infection?: No  Epilepsy, seizures or spells?: No  Migraine headaches?: No  A stroke or loss of function or sensation?: No  Any other neurological problems?: No  Have you ever been treated for depression?: No  Are you having problems with crying spells or loss of self-esteem?: No  Have you ever required psychiatric care?: No  Have you ever had hepatitis, liver disease or jaundice?: No  Have you been treated for blood clots in your veins, deep vein thromosis, inflammation in the veins, thrombosis, phlebitis, pulmonary embolism or varicosities?: No  Have you had excessive bleeding after surgery or dental work?: No  Do you bleed more than other women after a cut or scratch?: No  Do you have a history of anemia?: (!) Yes  Have you ever had thyroid problems or taken thyroid medication?: (!) Yes  Abnormal result, sublingual hypothyroidism   Do you have any endocrine problems?: No  Have you ever been in a major accident or suffered serious trauma?: No  Within the last year, has anyone hit, slapped, kicked or otherwise hurt you?: No  In the last year, has anyone forced you to have sex when you didn't want to?: No    Past Medical History 2   Have you ever received a blood transfusion?: No  Would you refuse a blood transfusion if a doctor judged it to be medically necessary?: No   If you answered Yes, would you rather die than receive a blood transfusion?: No  If you  answered Yes, is this for Baptism reasons?: No  Does anyone in your home smoke?: No  Do you use tobacco products?: No  Do you drink beer, wine or hard liquor?: No  Do you use any of the following: marijuana, speed, cocaine, heroin, hallucinogens or other drugs?: No   Is your blood type Rh negative?: No  Have you ever had abnormal antibodies in your blood?: No  Have you ever had asthma?: No  Have you ever had tuberculosis?: No  Do you have any allergies to drugs or over-the-counter medications?: No  Allergies: Dust Mites, Aspartame, Ethanol, Venlafaxine, Hydrochloride, Sertraline: No  Have you had any breast problems?: No  Have you ever ?: No  Have you had any gynecological surgical procedures such as cervical conization, a LEEP procedure, laser treatment, cryosurgery of the cervix or a dilation and curettage, etc?: No  Have you ever had any other surgical procedures?: No  Have you been hospitalized for a nonsurgical reason excluding normal delivery?: No  Have you ever had any anesthetic complications?: No  Have you ever had an abnormal pap smear?: No    Past Medical History (Continued)  Do you have a history of abnormalities of the uterus?: No  Did your mother take ALIN or any other hormones when she was pregnant with you?: No  Did it take you more than a year to become pregnant?: No  Have you ever been evaluated or treated for infertility?: IUI 8/06/19 , PCOS  Is there a history of medical problems in your family, which you feel may be important to this pregnancy?: No  Do you have any other problems we have not asked about which you feel may be important to this pregnancy?: No    Symptoms since last menstrual period  Do you have any of the following symptoms: abdominal pain, blood in stools or urine, chest pain, shortness of breath, coughing or vomiting up blood, your heart racing or skipping beats, nausea and vomiting, pain on urination or vaginal discharge or bleed: (!) Yes  Will the patient be 35 years  old or older at the time of delivery?: (!) Yes    Has the patient, baby's father or anyone in either family had:  Thalassemia (Italian, Greek, Mediterranean or  background only) and an MCV result less than 80?: No  Neural tube defect such as meningomyelocele, spina bifida or anencephaly?: No  Congenital heart defect?: No  Down's Syndrome?: No  Yaya-Sachs disease (Mandaen, Cajun, Lithuanian-[place])?: No  Sickle cell disease or trait ()?: No  Hemophilia or other inherited problems of blood?: No  Muscular dystrophy?: No  Cystic fibrosis?: No  Hughes's chorea?: No  Mental retardation/autism?: No  If yes, was the person tested for fragile X?: No  Any other inherited genetic or chromosomal disorder?: No  Maternal metabolic disorder (e.g Insulin-dependent diabetes, PKU)?: No  A child with birth defects not listed above?: No  Recurrent pregnancy loss or stillbirth?: No   Has the patient had any medications/street drugs/alcohol since her last menstrual period?: No  Does the patient or baby's father have any other genetic risks?: No    Infection History   Do you object to being tested for Hepatitis B?: No  Do you object to being tested for HIV?: No   Do you feel that you are at high risk for coming in contact with the AIDS virus?: No  Have you ever been treated for tuberculosis?: No  Have you ever had a positive skin test for tuberculosis?: No  Do you live with someone who has tuberculosis?: No  Have you ever been exposed to tuberculosis?: No  Do you have genital herpes?: No  Does your partner have genital herpes?: No  Have you had a viral illness since your last period?: cold symptoms  Have you ever had gonorrhea, chlamydia, syphilis, venereal warts, trichomoniasis, pelvic inflammatory disease or any other sexually transmitted disease?: No  Do you know if you are a genital group B streptococcus carrier?: No  Have you had chicken pox/varicella?: (!) Yes   Have you been vaccinated against chicken Pox?: No  Have you  had any other infectious diseases?: cold sores      Allergies as of 10/8/2019:    Allergies as of 10/08/2019     (No Known Allergies)       Current medications are:  Current Outpatient Medications   Medication Sig Dispense Refill     escitalopram (LEXAPRO) 5 MG tablet Take 1 tablet (5 mg) by mouth daily 90 tablet 1     metFORMIN (GLUCOPHAGE-XR) 500 MG 24 hr tablet Take 1,000 mg by mouth daily       Prenatal Vit-Fe Fumarate-FA (PRENATAL MULTIVITAMIN  PLUS IRON) 27-0.8 MG TABS Take 1 tablet by mouth daily           Early ultrasound screening tool:    Does patient have irregular periods?  No  Did patient use hormonal birth control in the three months prior to positive urine pregnancy test? No  Is the patient breastfeeding?  No  Is the patient 10 weeks or greater at time of education visit?  No

## 2019-10-09 LAB
BACTERIA SPEC CULT: NORMAL
DEPRECATED CALCIDIOL+CALCIFEROL SERPL-MC: 24 UG/L (ref 20–75)
HBV SURFACE AG SERPL QL IA: NONREACTIVE
HIV 1+2 AB+HIV1 P24 AG SERPL QL IA: NONREACTIVE
RUBV IGG SERPL IA-ACNC: 44 IU/ML
SPECIMEN SOURCE: NORMAL
T PALLIDUM AB SER QL: NONREACTIVE
TSH SERPL DL<=0.005 MIU/L-ACNC: 1.72 MU/L (ref 0.4–4)

## 2019-10-22 ENCOUNTER — PRENATAL OFFICE VISIT (OUTPATIENT)
Dept: MIDWIFE SERVICES | Facility: CLINIC | Age: 39
End: 2019-10-22
Payer: COMMERCIAL

## 2019-10-22 VITALS
WEIGHT: 201.2 LBS | HEART RATE: 90 BPM | BODY MASS INDEX: 30.82 KG/M2 | SYSTOLIC BLOOD PRESSURE: 116 MMHG | DIASTOLIC BLOOD PRESSURE: 79 MMHG

## 2019-10-22 DIAGNOSIS — O09.511 AMA (ADVANCED MATERNAL AGE) PRIMIGRAVIDA 35+, FIRST TRIMESTER: Primary | ICD-10-CM

## 2019-10-22 DIAGNOSIS — Z23 NEED FOR PROPHYLACTIC VACCINATION AND INOCULATION AGAINST INFLUENZA: ICD-10-CM

## 2019-10-22 LAB — HBA1C MFR BLD: 5 % (ref 0–5.6)

## 2019-10-22 PROCEDURE — 36415 COLL VENOUS BLD VENIPUNCTURE: CPT | Performed by: ADVANCED PRACTICE MIDWIFE

## 2019-10-22 PROCEDURE — 83036 HEMOGLOBIN GLYCOSYLATED A1C: CPT | Performed by: ADVANCED PRACTICE MIDWIFE

## 2019-10-22 PROCEDURE — 90686 IIV4 VACC NO PRSV 0.5 ML IM: CPT | Performed by: ADVANCED PRACTICE MIDWIFE

## 2019-10-22 PROCEDURE — 99207 ZZC FIRST OB VISIT: CPT | Performed by: ADVANCED PRACTICE MIDWIFE

## 2019-10-22 PROCEDURE — 90471 IMMUNIZATION ADMIN: CPT | Performed by: ADVANCED PRACTICE MIDWIFE

## 2019-10-22 NOTE — NURSING NOTE
"Chief Complaint   Patient presents with     Prenatal Care     11+0     Imm/Inj     Flu Shot       Initial /79   Pulse 90   Wt 91.3 kg (201 lb 3.2 oz)   LMP 2019   BMI 30.82 kg/m   Estimated body mass index is 30.82 kg/m  as calculated from the following:    Height as of 10/8/19: 1.721 m (5' 7.75\").    Weight as of this encounter: 91.3 kg (201 lb 3.2 oz).  BP completed using cuff size: regular    Questioned patient about current smoking habits.  Pt. quit smoking some time ago.          The following HM Due: pap smear      The following patient reported/Care Every where data was sent to:  P ABSTRACT QUALITY INITIATIVES [[id number]]        patient has appointment for today  Emily Adame                "

## 2019-10-22 NOTE — PROGRESS NOTES
"11w0d   Caroline Pride is a 39 year old who presents to the clinic for an new ob visit. She is not a previous CNM patient. This is their second child, but her wife, Mila, carried their first pregnancy. Their son, Geovany, is 2 years old. Caroline has some anxiety about her age and PCOS, and how those issues may affect the health of her baby. She is definitely interested in genetic screening. She also verbalizes concern about \"pre-diabetes\", as she was told that can be related to PCOS. She has been feeling a bit depressed lately, and per Mila, has struggled with anxiety in the past. Since becoming pregnant, her primary symptom has been fatigue.  Estimated Date of Delivery: May 12, 2020 is calculated from Patient's last menstrual period was 08/06/2019.     She has not had bleeding since her LMP.   She has had mild nausea, better now. Weigh loss has not occurred.   This was a planned pregnancy.   Wife is involved,  Mila   OTHER CONCERNS: Advanced maternal age     INFECTION HISTORY  HIV: no  Hepatitis B: no  Hepatitis C: no  Syphilis:  no  Tuberculosis: no   PPD- no  Herpes self: yes-cold sores  Herpes partner:  no  Chlamydia:  no  Gonorrhea:  no  HPV: no  BV:  no  Trichomonis:  no  Chicken Pox:  YES-had disease as a child  ====================================================  GENETIC SCREENING  Genetic screening reviewed. High Risk? Yes-AMA >40  ====================================================  PERSONAL/SOCIAL HISTORY  Lives lives with their family.  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  HX OF ABUSE: no  =====================================================   REVIEW OF SYSTEMS  CONSTITUTIONAL: NEGATIVE for fever, chills  INTEGUMENTARY/SKIN: mole on her back  EYES: NEGATIVE for vision changes   RESP: NEGATIVE for significant cough or SOB  BREAST: NEGATIVE for masses, tenderness or discharge  CV: NEGATIVE for chest pain, palpitations   GI: POSITIVE for constipation and nausea and NEGATIVE " for weight loss  : NEGATIVE for frequency, dysuria, or hematuria   female: NEGATIVE  MUSCULOSKELETAL: NEGATIVE for significant arthralgias or myalgia  NEURO: NEGATIVE for weakness, dizziness or paresthesias or headache  ENDOCRINE: NEGATIVE for temperature intolerance, skin/hair changes  PSYCHIATRIC: slight anxiety and depressed mood; stable  ====================================================    PHYSICAL EXAM:  /79   Pulse 90   Wt 91.3 kg (201 lb 3.2 oz)   LMP 2019   BMI 30.82 kg/m    BMI- Body mass index is 30.82 kg/m .,     RECOMMENDED WEIGHT GAIN: 15-25 lbs.  PHQ9- Today's Depression Rating was No Value exists for the : HP#PHQ9  GENERAL:  Pleasant pregnant female, alert, well groomed.   SKIN:  Warm and dry, without lesions or rashes  HEAD: Symmetrical features.  EYES:  PERRLA,   MOUTH:  Buccal mucosa pink, moist without lesions.    NECK:  Thyroid without enlargement and nodules.  Lymph nodes not palpable.   LUNGS:  Clear to auscultation.  HEART:  RRR without murmur.  ABDOMEN: Soft without masses , tenderness or organomegaly.  No CVA tenderness. No scars noted.     MUSCULOSKELETAL:  Full range of motion  EXTREMITIES:  No edema. No significant varicosities.     =========================================  ASSESSMENT:  11w0d,   (O09.511) AMA (advanced maternal age) primigravida 35+, first trimester  (primary encounter diagnosis)  Plan: Hemoglobin A1c    (Z23) Need for prophylactic vaccination and inoculation against influenza  Plan: INFLUENZA VACCINE IM > 6 MONTHS VALENT IIV4         [91330], Vaccine Administration, Initial         [61082]    PREGNANCY AT RISK? Yes-AMA  ==========================================  PLAN:  Instructed on use of triage nurse line and contacting the on call CNM after hours for an urgent need such as fever, vagina bleeding, bladder or vaginal infection, rupture of membranes,  or term labor.    Discussed the indications, uses for and false positives  for quad screen, nuchal translucency and fetal survey ultrasound at 18-20 weeks gestation. They are interested in genetic screening, M consultation already ordered.  Instructed on best evidence for: weight gain for her BMI for pregnancy; healthy diet and foods to avoid; exercise and activity during pregnancy;avoiding exposure to toxoplasmosis; and maintenance of a generally healthy lifestyle.   Discussed the harms, benefits, side effects and alternative therapies for current prescribed and OTC medications.  Return to care in 4 weeks.    Irwin Britt CNM

## 2019-10-28 ENCOUNTER — PRE VISIT (OUTPATIENT)
Dept: MATERNAL FETAL MEDICINE | Facility: CLINIC | Age: 39
End: 2019-10-28

## 2019-10-30 ENCOUNTER — HOSPITAL ENCOUNTER (OUTPATIENT)
Dept: ULTRASOUND IMAGING | Facility: CLINIC | Age: 39
Discharge: HOME | End: 2019-10-30
Attending: ADVANCED PRACTICE MIDWIFE | Admitting: ADVANCED PRACTICE MIDWIFE
Payer: COMMERCIAL

## 2019-10-30 ENCOUNTER — OFFICE VISIT (OUTPATIENT)
Dept: MATERNAL FETAL MEDICINE | Facility: CLINIC | Age: 39
End: 2019-10-30
Attending: ADVANCED PRACTICE MIDWIFE
Payer: COMMERCIAL

## 2019-10-30 DIAGNOSIS — O26.90 PREGNANCY RELATED CONDITION, ANTEPARTUM: ICD-10-CM

## 2019-10-30 DIAGNOSIS — O09.511 SUPERVISION OF ELDERLY PRIMIGRAVIDA IN FIRST TRIMESTER: Primary | ICD-10-CM

## 2019-10-30 DIAGNOSIS — O09.529 ANTEPARTUM MULTIGRAVIDA OF ADVANCED MATERNAL AGE: Primary | ICD-10-CM

## 2019-10-30 DIAGNOSIS — O09.511 SUPERVISION OF ELDERLY PRIMIGRAVIDA IN FIRST TRIMESTER: ICD-10-CM

## 2019-10-30 PROCEDURE — 96040 ZZH GENETIC COUNSELING, EACH 30 MINUTES: CPT | Mod: ZF | Performed by: GENETIC COUNSELOR, MS

## 2019-10-30 PROCEDURE — 36415 COLL VENOUS BLD VENIPUNCTURE: CPT | Performed by: OBSTETRICS & GYNECOLOGY

## 2019-10-30 PROCEDURE — 40000791 ZZHCL STATISTIC VERIFI PRENATAL TRISOMY 21,18,13: Performed by: OBSTETRICS & GYNECOLOGY

## 2019-10-30 PROCEDURE — 76813 OB US NUCHAL MEAS 1 GEST: CPT

## 2019-10-30 NOTE — PROGRESS NOTES
"Please see \"Imaging\" tab under \"Chart Review\" for details of today's US.    Unique Covington, DO    "

## 2019-10-30 NOTE — PROGRESS NOTES
[facility]  Genetic Counseling Consult    Patient: Caroline Pride YOB: 1980   Date of Service: 10/30/19      Caroline Pride was seen at [facility] for genetic consultation to discuss the options for screening and testing for fetal chromosome abnormalities.  The indication for genetic counseling is advanced maternal age. She was accompanied to today's visit by her partner, Mila.       Impression/Plan:   1.  Caroline had an ultrasound and blood draw for NIPT (Innatal test through [employer]).  Results are expected within 7-10 days, and will be available in [employer].  We will contact her to discuss the results, and a copy will be forwarded to the office of the referring OB provider. She requested a detailed voice message be left at [phone] if she is unavailable. They do not wish to know fetal sex information.    2.  Maternal serum AFP (single marker screen) is recommended after 15 weeks to screen for open neural tube defects. A quad screen should not be performed.    3.  An 18-20 week comprehensive ultrasound is standard of care for all women 35 or older at delivery.    Pregnancy History:   /Parity:    Age at Delivery: 39 year old  JUSTO: 2020, by Last Menstrual Period  Gestational Age: 12w1d    No significant complications or exposures were reported in the current pregnancy.    This pregnancy was conceived via intrauterine insemination with an anonymous sperm donor. This same donor     Caroline and her partner Mlia have a healthy 2 year old son that Mila carried with no complications.    Medical History:   Caroline s reported medical history is not expected to impact pregnancy management or risks to fetal development.       Family History:   A three-generation pedigree was obtained, and is scanned under the  Media  tab.   The following significant findings were reported by Caroline:    Caroline presented documents  regarding the sperm donor and his reported family history. No pertinent concerns were noted.  Caroline's maternal grandmother passed away at age 42 from breast cancer. Cancer most often occurs by chance, however some families seem to develop cancer more frequently than expected. Everyone has a risk to develop cancer, but individuals may be at an increased risk to develop cancer based on their family history. Due to the young age at diagnosis, there is an elevated concern for a genetic basis for her grandmother's cancer.  Genetic counseling and testing is available for individuals who would like to proceed.    Otherwise, the reported family history is negative for multiple miscarriages, stillbirths, birth defects, mental retardation, known genetic conditions, and consanguinity.       Risk Assessment for Chromosome Conditions:   We explained that the risk for fetal chromosome abnormalities increases with maternal age. We discussed specific features of common chromosome abnormalities, including Down syndrome, trisomy 13, trisomy 18, and sex chromosome trisomies.      - At age 39 at midtrimester, the risk to have a baby with Down syndrome is 1 in 74.     - At age 39 at midtrimester, the risk to have a baby with any chromosome abnormality is 1 in 40.     Testing Options:   We discussed the following options:   First trimester screening    First trimester ultrasound with nuchal translucency and nasal bone assessments, maternal plasma hCG, FAHAD-A, and AFP measurement    Screens for fetal trisomy 21, trisomy 13, and trisomy 18    Cannot screen for open neural tube defects; maternal serum AFP after 15 weeks is recommended     Non-invasive Prenatal Testing (NIPT)    Maternal plasma cell-free DNA testing; first trimester ultrasound with nuchal translucency and nasal bone assessment is recommended, when appropriate    Screens for fetal trisomy 21, trisomy 13, trisomy 18, and sex chromosome aneuploidy    Cannot screen for open  neural tube defects; maternal serum AFP after 15 weeks is recommended     Comprehensive (Level II) ultrasound: Detailed ultrasound performed between 18-22 weeks gestation to screen for major birth defects and markers for aneuploidy.      We reviewed the benefits and limitations of this testing.  Screening tests provide a risk assessment specific to the pregnancy for certain fetal chromosome abnormalities, but cannot definitively diagnose or exclude a fetal chromosome abnormality.  Follow-up genetic counseling and consideration of diagnostic testing is recommended with any abnormal screening result.     Diagnostic tests carry inherent risks- including risk of miscarriage- that require careful consideration.  These tests can detect fetal chromosome abnormalities with greater than 99% certainty.  Results can be compromised by maternal cell contamination or mosaicism, and are limited by the resolution of cytogenetic G-banding technology.  There is no screening nor diagnostic test that can detect all forms of birth defects or mental disability.       It was a pleasure to be involved with Caroline quick care. Face-to-face time of the meeting was 30 minutes.    Kaitlynn Herrera MS, [facility]  Maternal Fetal Medicine  [facility]  Ph: [phone]  [email or web address]

## 2019-11-06 ENCOUNTER — TELEPHONE (OUTPATIENT)
Dept: MATERNAL FETAL MEDICINE | Facility: CLINIC | Age: 39
End: 2019-11-06

## 2019-11-06 LAB — LAB SCANNED RESULT: NORMAL

## 2019-11-06 NOTE — TELEPHONE ENCOUNTER
"11/6/2019    I called Caroline to discuss her normal \"Innatal\" cell-free fetal DNA screening results. This information was left in a detailed voice message per Caroline's request.  Results came back negative for chromosome abnormalities in chromosomes 21, 18, & 13, as well as the sex chromosomes.  These normal results suggest the likelihood of fetal Down syndrome, trisomy 13, or trisomy 18 is very low. Results consistent with two sex chromosomes. Sex chromosome information was NOT disclosed to Caroline per her request.    Discussed high detection rates for fetal Down syndrome, trisomies 13 and 18, and fetal sex chromosome abnormalities; however, not 100%. While this test is a highly accurate screen, it does not replace the diagnostic capabilities of standard prenatal diagnostic tests such as amniocentesis and CVS.     Plan:  Level II ultrasound is recommended, given advanced maternal age. This has been scheduled within [facility] for 1/2.  MSAFP is the appropriate second trimester screening test for open neural tube defects; the maternal quad screen is not indicated.      Kaitlynn Herrera MS, [facility]  Licensed Genetic Counselor  Phone: [phone]  Pager: [phone]    "

## 2019-12-04 ENCOUNTER — PRENATAL OFFICE VISIT (OUTPATIENT)
Dept: MIDWIFE SERVICES | Facility: CLINIC | Age: 39
End: 2019-12-04
Payer: COMMERCIAL

## 2019-12-04 DIAGNOSIS — O09.519 AMA (ADVANCED MATERNAL AGE) PRIMIGRAVIDA 35+, UNSPECIFIED TRIMESTER: Primary | ICD-10-CM

## 2019-12-04 PROCEDURE — 99207 ZZC PRENATAL VISIT: CPT | Performed by: ADVANCED PRACTICE MIDWIFE

## 2019-12-05 VITALS
WEIGHT: 202 LBS | BODY MASS INDEX: 30.94 KG/M2 | SYSTOLIC BLOOD PRESSURE: 118 MMHG | HEART RATE: 94 BPM | DIASTOLIC BLOOD PRESSURE: 82 MMHG

## 2019-12-05 NOTE — PROGRESS NOTES
Pt here with wife and son. Feeling well, no concerns. Has MFM US scheduled. Initial BP on automatic cuff was elevated but immediate retake manually was WNL and agrees with her baseline BPs. Reviewed results of NIPT, gave them the sex of fetus in an envelope. Starting to consider CBE options, may be interested in Shared [place] - gave schedule and information. RTC for Level II US and plan for GCT at 24 weeks. MML

## 2019-12-31 ENCOUNTER — PRENATAL OFFICE VISIT (OUTPATIENT)
Dept: MIDWIFE SERVICES | Facility: CLINIC | Age: 39
End: 2019-12-31
Payer: COMMERCIAL

## 2019-12-31 VITALS
TEMPERATURE: 98 F | SYSTOLIC BLOOD PRESSURE: 110 MMHG | BODY MASS INDEX: 31.55 KG/M2 | DIASTOLIC BLOOD PRESSURE: 72 MMHG | HEART RATE: 89 BPM | WEIGHT: 206 LBS

## 2019-12-31 DIAGNOSIS — O09.519 AMA (ADVANCED MATERNAL AGE) PRIMIGRAVIDA 35+, UNSPECIFIED TRIMESTER: Primary | ICD-10-CM

## 2019-12-31 PROCEDURE — 99207 ZZC PRENATAL VISIT: CPT | Performed by: ADVANCED PRACTICE MIDWIFE

## 2019-12-31 NOTE — PROGRESS NOTES
Here with wife and son, feeling well, no concerns. Son jumped on belly last week - discussed signs of placental abruption, when to call and come in for evaluation. Starting to feel fetal movement. Has SHANA US scheduled on 1/2/20. Interested in SW group - June due dates. Discussed GCT at next visit or at 24 weeks. MML

## 2020-01-02 ENCOUNTER — HOSPITAL ENCOUNTER (OUTPATIENT)
Dept: ULTRASOUND IMAGING | Facility: CLINIC | Age: 40
Discharge: HOME | End: 2020-01-02
Attending: OBSTETRICS & GYNECOLOGY | Admitting: OBSTETRICS & GYNECOLOGY
Payer: COMMERCIAL

## 2020-01-02 ENCOUNTER — OFFICE VISIT (OUTPATIENT)
Dept: MATERNAL FETAL MEDICINE | Facility: CLINIC | Age: 40
End: 2020-01-02
Attending: OBSTETRICS & GYNECOLOGY
Payer: COMMERCIAL

## 2020-01-02 DIAGNOSIS — O09.529 ANTEPARTUM MULTIGRAVIDA OF ADVANCED MATERNAL AGE: ICD-10-CM

## 2020-01-02 DIAGNOSIS — O09.529 ANTEPARTUM MULTIGRAVIDA OF ADVANCED MATERNAL AGE: Primary | ICD-10-CM

## 2020-01-02 PROCEDURE — 76811 OB US DETAILED SNGL FETUS: CPT

## 2020-01-02 NOTE — PROGRESS NOTES
"Please see \"Imaging\" tab under Chart Review for full details.    Stephanie Cantor MD  Maternal Fetal Medicine    "

## 2020-01-06 ENCOUNTER — PRENATAL OFFICE VISIT (OUTPATIENT)
Dept: MIDWIFE SERVICES | Facility: CLINIC | Age: 40
End: 2020-01-06
Payer: COMMERCIAL

## 2020-01-06 VITALS — DIASTOLIC BLOOD PRESSURE: 81 MMHG | HEART RATE: 90 BPM | SYSTOLIC BLOOD PRESSURE: 129 MMHG

## 2020-01-06 DIAGNOSIS — O09.519 AMA (ADVANCED MATERNAL AGE) PRIMIGRAVIDA 35+, UNSPECIFIED TRIMESTER: Primary | ICD-10-CM

## 2020-01-06 PROCEDURE — 99207 ZZC PRENATAL VISIT: CPT | Performed by: ADVANCED PRACTICE MIDWIFE

## 2020-01-06 NOTE — PROGRESS NOTES
Pt here for SW session 1 with wife. Feeling well, no concerns. Baby is moving. No regular contractions, LOF or bleeding. Will plan to complete GCT at next SW session. MML

## 2020-02-03 ENCOUNTER — PRENATAL OFFICE VISIT (OUTPATIENT)
Dept: MIDWIFE SERVICES | Facility: CLINIC | Age: 40
End: 2020-02-03
Payer: COMMERCIAL

## 2020-02-03 VITALS
WEIGHT: 208 LBS | HEART RATE: 98 BPM | BODY MASS INDEX: 31.86 KG/M2 | DIASTOLIC BLOOD PRESSURE: 81 MMHG | SYSTOLIC BLOOD PRESSURE: 126 MMHG

## 2020-02-03 DIAGNOSIS — O09.519 AMA (ADVANCED MATERNAL AGE) PRIMIGRAVIDA 35+, UNSPECIFIED TRIMESTER: Primary | ICD-10-CM

## 2020-02-03 LAB
GLUCOSE 1H P 50 G GLC PO SERPL-MCNC: 139 MG/DL (ref 60–129)
HGB BLD-MCNC: 11.4 G/DL (ref 11.7–15.7)

## 2020-02-03 PROCEDURE — 36415 COLL VENOUS BLD VENIPUNCTURE: CPT | Performed by: ADVANCED PRACTICE MIDWIFE

## 2020-02-03 PROCEDURE — 82950 GLUCOSE TEST: CPT | Performed by: ADVANCED PRACTICE MIDWIFE

## 2020-02-03 PROCEDURE — 99207 ZZC PRENATAL VISIT: CPT | Performed by: ADVANCED PRACTICE MIDWIFE

## 2020-02-03 PROCEDURE — 00000218 ZZHCL STATISTIC OBHBG - HEMOGLOBIN: Performed by: ADVANCED PRACTICE MIDWIFE

## 2020-02-04 DIAGNOSIS — O09.519 AMA (ADVANCED MATERNAL AGE) PRIMIGRAVIDA 35+: Primary | ICD-10-CM

## 2020-02-04 NOTE — PROGRESS NOTES
Pt participated in Shared [place] session 2 - a  from [employer]  came to speak about  services, comfort measures in pregnancy and early labor. Feeling baby move. No contractions, cramping, LOF or bleeding. GCT and hgb today. Growth US scheduled on 3/17. RTC in 4 weeks. MML

## 2020-02-14 DIAGNOSIS — O09.519 AMA (ADVANCED MATERNAL AGE) PRIMIGRAVIDA 35+: ICD-10-CM

## 2020-02-14 LAB
GLUCOSE 1H P 100 G GLC PO SERPL-MCNC: 148 MG/DL (ref 60–179)
GLUCOSE 2H P 100 G GLC PO SERPL-MCNC: 100 MG/DL (ref 60–154)
GLUCOSE 3H P 100 G GLC PO SERPL-MCNC: 91 MG/DL (ref 60–139)
GLUCOSE P FAST SERPL-MCNC: 97 MG/DL (ref 60–94)

## 2020-02-14 PROCEDURE — 82952 GTT-ADDED SAMPLES: CPT | Performed by: ADVANCED PRACTICE MIDWIFE

## 2020-02-14 PROCEDURE — 82951 GLUCOSE TOLERANCE TEST (GTT): CPT | Performed by: ADVANCED PRACTICE MIDWIFE

## 2020-02-14 PROCEDURE — 36415 COLL VENOUS BLD VENIPUNCTURE: CPT | Performed by: ADVANCED PRACTICE MIDWIFE

## 2020-02-24 ENCOUNTER — NURSE TRIAGE (OUTPATIENT)
Dept: NURSING | Facility: CLINIC | Age: 40
End: 2020-02-24

## 2020-02-24 ENCOUNTER — TELEPHONE (OUTPATIENT)
Dept: MIDWIFE SERVICES | Facility: CLINIC | Age: 40
End: 2020-02-24

## 2020-02-24 NOTE — TELEPHONE ENCOUNTER
".  FNA triage call :   Presenting problem : Pt called and is  28 wks pregnant  ,  JUSTO 5/15/20  And follows [facility] and will be delivering at [facility]    Baby not moving  Much like one kick in last 24 hours.  Currently : no pain but discharge smelly , and no fever .  Guideline used : pregnancy labor  A AH   Disposition and recommendations : per protocol page midwife Yodit Westfall to Pt at [phone]   @ 723am and instructed Pt to call back if no answer to page within 20 minutes or if any problems to call.   Caller verbalizes understanding and denies further questions and will call back if further symptoms to triage or questions  . Shwetha Marcano RN  - [place] Nurse Advisor     Reason for Disposition    [1] Baby moving less today (e.g., kick count < 5 in 1 hour or < 10 in 2 hours) AND [2] pregnant 23 or more weeks    Additional Information    Negative: Passed out (i.e., lost consciousness, collapsed and was not responding)    Negative: Shock suspected (e.g., cold/pale/clammy skin, too weak to stand, low BP, rapid pulse)    Negative: Difficult to awaken or acting confused (e.g., disoriented, slurred speech)    Negative: [1] SEVERE abdominal pain (e.g., excruciating) AND [2] constant AND [3] present > 1 hour    Negative: Severe bleeding (e.g., continuous red blood from vagina, or large blood clots)    Negative: Umbilical cord hanging out of the vagina (shiny, white, curled appearance, \"like telephone cord\")    Negative: Can see baby    Negative: Uncontrollable urge to push (i.e., feels like baby is coming out now)    Negative: Sounds like a life-threatening emergency to the triager    Negative: MODERATE-SEVERE abdominal pain    Negative: Contractions < 10 minutes apart for 1 hour (i.e., 6 or more contractions an hour)    Negative: [1] Contractions > 10 minutes apart AND [2] persist > 24 hours AND [3] no improvement using CARE ADVICE    Negative: [1] Contractions AND [2] any vaginal " bleeding (including: red blood, clots, spotting, or pink/brown mucous)    Negative: Vaginal bleeding  (Exception: brief spotting after intercourse or pelvic exam, or slight pinkish or brownish mucous discharge)    Negative: Leakage of fluid from vagina    Protocols used: PREGNANCY - LABOR - [id number]

## 2020-02-24 NOTE — TELEPHONE ENCOUNTER
Pt called back.  She is feeling regular movement and is no longer concerned.  She does not have any further questions for you.  Gracy Galindo RN

## 2020-02-24 NOTE — TELEPHONE ENCOUNTER
28w6d    Patient called this morning at 7am with some decreased fetal movement the last few days.  Discussed with patient that as getting up this morning trying to eat something and drink something laying on side and see if baby moving.   T.C. to patient at noon and left message to call if not feeling fetal movement.  Yodit Westfall CNM

## 2020-03-02 ENCOUNTER — PRENATAL OFFICE VISIT (OUTPATIENT)
Dept: MIDWIFE SERVICES | Facility: CLINIC | Age: 40
End: 2020-03-02
Payer: COMMERCIAL

## 2020-03-02 VITALS
DIASTOLIC BLOOD PRESSURE: 87 MMHG | HEART RATE: 96 BPM | SYSTOLIC BLOOD PRESSURE: 119 MMHG | WEIGHT: 211 LBS | BODY MASS INDEX: 32.32 KG/M2

## 2020-03-02 DIAGNOSIS — O09.513 AMA (ADVANCED MATERNAL AGE) PRIMIGRAVIDA 35+, THIRD TRIMESTER: Primary | ICD-10-CM

## 2020-03-02 PROCEDURE — 84443 ASSAY THYROID STIM HORMONE: CPT | Performed by: ADVANCED PRACTICE MIDWIFE

## 2020-03-02 PROCEDURE — 36415 COLL VENOUS BLD VENIPUNCTURE: CPT | Performed by: ADVANCED PRACTICE MIDWIFE

## 2020-03-02 PROCEDURE — 86803 HEPATITIS C AB TEST: CPT | Performed by: ADVANCED PRACTICE MIDWIFE

## 2020-03-02 PROCEDURE — 99207 ZZC PRENATAL VISIT: CPT | Performed by: ADVANCED PRACTICE MIDWIFE

## 2020-03-02 NOTE — PROGRESS NOTES
29w6d  Patient feeling well. Positive fetal movement. Denies water leaking, vaginal bleeding, decreased fetal movement, contraction pain, or headaches.   Participated in shared [place] session 3: breastfeeding. Hep C drawn. Signed water birth consent. Feels tired all the time so ordered thyroid lab. Normal hemoglobin and supplementing with iron.   Danger signs reviewed, pre-eclampsia signs and symptoms discussed.   Knows when to call triage and has phone numbers.   Follow up in 2 weeks.   DANN Cassidy CNM

## 2020-03-03 LAB
HCV AB SERPL QL IA: NONREACTIVE
TSH SERPL DL<=0.005 MIU/L-ACNC: 1.83 MU/L (ref 0.4–4)

## 2020-03-10 ENCOUNTER — HEALTH MAINTENANCE LETTER (OUTPATIENT)
Age: 40
End: 2020-03-10

## 2020-03-16 ENCOUNTER — TELEPHONE (OUTPATIENT)
Dept: MATERNAL FETAL MEDICINE | Facility: CLINIC | Age: 40
End: 2020-03-16

## 2020-03-16 ENCOUNTER — NURSE TRIAGE (OUTPATIENT)
Dept: NURSING | Facility: CLINIC | Age: 40
End: 2020-03-16

## 2020-03-16 NOTE — TELEPHONE ENCOUNTER
SHANA received call from [facility] informing this patient has 3/17/2020 f/u ultrasound w/ MFM and has been in contact with someone who tested + for COVID-19.  I submitted the screening on 3/16/20.      Fadia Mistry

## 2020-03-16 NOTE — TELEPHONE ENCOUNTER
"Wife works at the [employer] and was in close contact with an individual who has covid 19. Per on-care she was advised to self-quarantine but wasn't advised to be tested due to being low risk. Patient is 32 weeks pregnant - US was cancelled for tomorrow because of contact with spouse. Advised patient go through oncare and be very specific about rationale for being tested. Considered \"geriatric pregnancy\" and needs US completed.    Georgia Alvarenga RN on 3/16/2020 at 5:15 PM      Reason for Disposition    [1] CORONAVIRUS EXPOSURE within last 14 days AND [2] NO cough, fever, or breathing difficulty    Additional Information    Negative: Severe difficulty breathing (e.g., struggling for each breath, speak in single words, bluish lips)    Negative: Sounds like a life-threatening emergency to the triager    Negative: [1] Difficulty breathing (shortness of breath) occurs AND [2] onset > 14 days after CORONAVIRUS EXPOSURE (Close Contact)    Negative: [1] Dry cough occurs AND [2] onset > 14 days after CORONAVIRUS EXPOSURE    Negative: [1] Wet cough (i.e., white-yellow, yellow, green, or betty colored sputum) AND [2] onset > 14 days after CORONAVIRUS EXPOSURE    Negative: [1] Common cold symptoms AND [2] onset > 14 days after CORONAVIRUS EXPOSURE    Negative: [1] Difficulty breathing occurs AND [2] within 14 days of CORONAVIRUS EXPOSURE    Negative: Patient sounds very sick or weak to the triager    Negative: [1] Fever or feeling feverish AND [2] within 14 Days of CORONAVIRUS EXPOSURE    Negative: [1] Cough occurs AND [2] within 14 days of CORONAVIRUS EXPOSURE    Negative: [1] Fever or feeling feverish AND [2] symptoms of lower respiratory illness (e.g., cough, difficulty breathing) AND [3] TRAVEL FROM [place] within last 14 days    Negative: [1] Body aches, chills, diarrhea, headache, runny nose, or sore throat occur AND [2] within 14 days of CORONAVIRUS EXPOSURE    [facility] Specific Disposition  - REQUIRED:  Health " [place] Specific Patient Instructions  COVID 19 Nurse Triage Plan    Patient referred to virtual visit with a provider through [facility] (Preferred option). **Follow System Ambulatory Workflow for COVID 19 on instructions on how to access [facility]**     Instructions Given to Patient  It is recommended that you setup a virtual visit with one of our virtual providers.  To do this follow these instructions:    1. Go to the website [email or web address]  2. Create an account (you will need your insurance information)  3. Start a new visit  4. Choose your diagnosis (e.g. COVID19)  5. Fill out the information about your symptoms  6. A provider will reach out to you by text, phone call or video visit based on your request    While you are at home please follow these instructions to care for yourself:    Isolate Yourself:  Isolate yourself at home.   Do Not allow any visitors  Do Not go to work or school  Do Not go to [facility],  centers, shopping, or other public places.  Do Not shake hands.  Avoid close contact with others (hugging, kissing).    Protect Others:  Cover Your Mouth and Nose with a mask, disposable tissue or wash cloth to avoid spreading germs to others.  Wash your hands and face frequently with soap and water.    Fever Medicines:  For fever relief, take acetaminophen or ibuprofen.  Treat fevers above 101  F (38.3  C) to lower fevers and make you more comfortable.   Acetaminophen (e.g., Tylenol): Take 650 mg (two 325 mg pills) by mouth every 4-6 hours as needed of regular strength Tyleno or 1,000 mg (two 500 mg pills) every 8 hours as needed of Extra Strength Tylenol.   Ibuprofen (e.g., Motrin, Advil): Take 400 mg (two 200 mg pills) by mouth every 6 hours as needed.   Acetaminophen is thought to be safer than ibuprofen or naproxen for people over 65 years old. Acetaminophen is in many OTC and prescription medicines. It might be in more than one medicine that you are taking. You need to be careful and not  take an overdose. Before taking any medicine, read all the instructions on the package.  Caution -NSAIDs (e.g., ibuprofen, naproxen): Do not take nonsteroidal anti-inflammatory drugs (NSAIDs) if you have stomach problems, kidney disease, heart failure, or other contraindications to using this type of medicine. Do not take NSAID medicines for over 7 days without consulting your PCP. Do not take NSAID medicines if you are pregnant. Do not take NSAID medicines if you are also taking blood thinners.     Call Back If: Breathing difficulty develops or you become worse.    Thank you for limiting contact with others, wearing a simple mask to cover your cough, practice good hand hygiene habits and accessing our virtual services where possible to limit the spread of this virus.    For more information about COVID19 and options for caring for yourself at home, please visit the CDC website at https://www.cdc.gov/coronavirus/2019-ncov/about/steps-when-sick.html  For more options for care at [facility], please visit our website at [email or web address]    Protocols used: CORONAVIRUS (2019-NCOV) EXPOSURE-A-AH

## 2020-03-18 ENCOUNTER — TELEPHONE (OUTPATIENT)
Dept: MIDWIFE SERVICES | Facility: CLINIC | Age: 40
End: 2020-03-18

## 2020-03-18 NOTE — TELEPHONE ENCOUNTER
Patient is 32w1d. Wife was not in contact with someone who was contagious so going back to work. 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 She wasn't ever tested as the test was canceled before she was ever able to get in. Since getting closer to the due date, does she need to self quarantine from wife since she will be in contact with people. Neither one of them have had any symptoms. Routing to on-call CNM. Please call [phone] and if doesn't answer call [phone]. Thanks.   Tsering Thacker, RN-BSN

## 2020-03-18 NOTE — TELEPHONE ENCOUNTER
32w1d  Patient  Partner works at [employer],at the store had a employee that was known + for COVID-19 but now they just found out they may have been past the time when he/she were contagious.    Pt asked if she needs to quarintine herself from her partner due to that her partner works at a [employer] store.  Told patient that that is not the recommendations at this time.   The grocery store is putting in place good social distancing and cleaning protocols.  Pt was in [facility] and had this group cancelled asked about when to return to clinic. Reviewed that we are attempting to space out prenatal visits and enc. patietn to call and make appointment for 34 weeks at around the time [facility] was to occur which will be 3/30.20. patient agreed to make appt around then.  PATIENT SHOULD BE SEEN IN CLINIC AT THAT TIME.  Patient also had MFM U/S for growth that was cancelled this week, discussed that MFM may call to reschedule or maybe consider growth at 36 weeks gestation.  Will discuss with patient at next visit around the 30th.  Discussed that in regards to this virus prenatal care is changing but we are attempting to reduce number of prenatal visits and use telephone or virtual visits sometimes.  Reviewed PTL and preeclamptic signs and symptoms with patient today.    Patient does have + fetal movement daily.  Enc. To call if have any questions.    Patient continues to review the Department of Health [place] website for recommendations as well.   Patient should call and make appt to be seen in clinic around 3/30/20.    Yodit Westfall CNM

## 2020-03-18 NOTE — TELEPHONE ENCOUNTER
32w1d  Left message and will attempt to call patient back regarding her questions.  Message was that I would call her back in next 30 minutes.  Yodit Westfall CNM

## 2020-03-30 ENCOUNTER — PRENATAL OFFICE VISIT (OUTPATIENT)
Dept: MIDWIFE SERVICES | Facility: CLINIC | Age: 40
End: 2020-03-30
Payer: COMMERCIAL

## 2020-03-30 VITALS
SYSTOLIC BLOOD PRESSURE: 120 MMHG | HEART RATE: 96 BPM | OXYGEN SATURATION: 96 % | DIASTOLIC BLOOD PRESSURE: 81 MMHG | WEIGHT: 211 LBS | BODY MASS INDEX: 32.32 KG/M2

## 2020-03-30 DIAGNOSIS — O09.513 AMA (ADVANCED MATERNAL AGE) PRIMIGRAVIDA 35+, THIRD TRIMESTER: Primary | ICD-10-CM

## 2020-03-30 PROCEDURE — 99207 ZZC PRENATAL VISIT: CPT | Performed by: ADVANCED PRACTICE MIDWIFE

## 2020-03-30 NOTE — PROGRESS NOTES
"Feeling well, no concerns. Baby is active. Denies regular contractions, LOF or bleeding. No HA, visual changes. Reviewed most recent visitor policy at hospital. Also discussed water birth and nitrous not available at this time. Pt considering going [place] more rural to avoid being in the \"epicenter\". Her parents live in [place] and she would have access to [facility] care. Encouraged her to call and discuss what a transfer of care would look like to ensure it would be the right choice for her. She is concerned because her wife works in a grocery store and has increased risk of exposure. We also discussed possible exposure to her parents if she were to now go and stay with them. Offered elective induction anytime after 39 weeks, although this would not be different from recommendation to deliver by 40 weeks due to AMA, 40yo. Pt did not have growth US at 32 weeks, unsure what recommendation is for this per [facility]. Put order in for US and BPPs to start at 36 weeks.  Has phone numbers and knows where to go in labor. RTC in 2-3 weeks, combine with BPP at [facility]. MML  "

## 2020-04-08 ENCOUNTER — PRENATAL OFFICE VISIT (OUTPATIENT)
Dept: MIDWIFE SERVICES | Facility: CLINIC | Age: 40
End: 2020-04-08
Payer: COMMERCIAL

## 2020-04-08 ENCOUNTER — OFFICE VISIT (OUTPATIENT)
Dept: MATERNAL FETAL MEDICINE | Facility: CLINIC | Age: 40
End: 2020-04-08
Attending: ADVANCED PRACTICE MIDWIFE
Payer: COMMERCIAL

## 2020-04-08 ENCOUNTER — HOSPITAL ENCOUNTER (OUTPATIENT)
Dept: ULTRASOUND IMAGING | Facility: CLINIC | Age: 40
End: 2020-04-08
Attending: ADVANCED PRACTICE MIDWIFE
Payer: COMMERCIAL

## 2020-04-08 VITALS
SYSTOLIC BLOOD PRESSURE: 114 MMHG | HEART RATE: 92 BPM | TEMPERATURE: 98.9 F | DIASTOLIC BLOOD PRESSURE: 74 MMHG | BODY MASS INDEX: 32.22 KG/M2 | HEIGHT: 68 IN | WEIGHT: 212.6 LBS

## 2020-04-08 DIAGNOSIS — O09.529 ANTEPARTUM MULTIGRAVIDA OF ADVANCED MATERNAL AGE: Primary | ICD-10-CM

## 2020-04-08 DIAGNOSIS — O09.513 AMA (ADVANCED MATERNAL AGE) PRIMIGRAVIDA 35+, THIRD TRIMESTER: Primary | ICD-10-CM

## 2020-04-08 DIAGNOSIS — O26.90 PREGNANCY RELATED CONDITION, ANTEPARTUM: ICD-10-CM

## 2020-04-08 PROCEDURE — 99207 ZZC PRENATAL VISIT: CPT | Performed by: ADVANCED PRACTICE MIDWIFE

## 2020-04-08 PROCEDURE — 76816 OB US FOLLOW-UP PER FETUS: CPT

## 2020-04-08 ASSESSMENT — PATIENT HEALTH QUESTIONNAIRE - PHQ9
SUM OF ALL RESPONSES TO PHQ QUESTIONS 1-9: 6
5. POOR APPETITE OR OVEREATING: SEVERAL DAYS

## 2020-04-08 ASSESSMENT — ANXIETY QUESTIONNAIRES
5. BEING SO RESTLESS THAT IT IS HARD TO SIT STILL: NOT AT ALL
3. WORRYING TOO MUCH ABOUT DIFFERENT THINGS: SEVERAL DAYS
1. FEELING NERVOUS, ANXIOUS, OR ON EDGE: SEVERAL DAYS
2. NOT BEING ABLE TO STOP OR CONTROL WORRYING: NOT AT ALL
GAD7 TOTAL SCORE: 5
7. FEELING AFRAID AS IF SOMETHING AWFUL MIGHT HAPPEN: SEVERAL DAYS
IF YOU CHECKED OFF ANY PROBLEMS ON THIS QUESTIONNAIRE, HOW DIFFICULT HAVE THESE PROBLEMS MADE IT FOR YOU TO DO YOUR WORK, TAKE CARE OF THINGS AT HOME, OR GET ALONG WITH OTHER PEOPLE: SOMEWHAT DIFFICULT
6. BECOMING EASILY ANNOYED OR IRRITABLE: SEVERAL DAYS

## 2020-04-08 ASSESSMENT — MIFFLIN-ST. JEOR: SCORE: 1678.88

## 2020-04-08 NOTE — PROGRESS NOTES
Please refer to ultrasound report under 'Imaging' Studies of 'Chart Review' tabs.    Gary Jerome M.D.

## 2020-04-08 NOTE — PROGRESS NOTES
35w1d  Patient here with her partner Caroline on the phone, discussed normal labor and delivery right now, reviewed other options as nitrous oxide is  Not available.  Reviewed IV narcotics and epidural.  Reviewed that 1 support person but needs to stay in hospital.   Discussed induction of labor due to maternal age and recommendations.  Pt has growth U/S today and will review BPP with MFM.  Discussed CNM role in labor and delivery as patient and partner had thought about a  and now that is not an option.  Discussed laboring at home as long as possible and that CNM will support that over the phone if requested.  rtc in 1 week as she is coming in weekly for MFM BPP appts. alexi

## 2020-04-09 ASSESSMENT — ANXIETY QUESTIONNAIRES: GAD7 TOTAL SCORE: 5

## 2020-04-15 ENCOUNTER — PRENATAL OFFICE VISIT (OUTPATIENT)
Dept: MIDWIFE SERVICES | Facility: CLINIC | Age: 40
End: 2020-04-15
Payer: COMMERCIAL

## 2020-04-15 VITALS
WEIGHT: 210 LBS | HEART RATE: 97 BPM | BODY MASS INDEX: 32.17 KG/M2 | SYSTOLIC BLOOD PRESSURE: 130 MMHG | OXYGEN SATURATION: 97 % | DIASTOLIC BLOOD PRESSURE: 89 MMHG | TEMPERATURE: 98.6 F

## 2020-04-15 DIAGNOSIS — O09.513 AMA (ADVANCED MATERNAL AGE) PRIMIGRAVIDA 35+, THIRD TRIMESTER: Primary | ICD-10-CM

## 2020-04-15 LAB
CAPILLARY BLOOD COLLECTION: NORMAL
HGB BLD-MCNC: 12.7 G/DL (ref 11.7–15.7)

## 2020-04-15 PROCEDURE — 36416 COLLJ CAPILLARY BLOOD SPEC: CPT | Performed by: ADVANCED PRACTICE MIDWIFE

## 2020-04-15 PROCEDURE — 87653 STREP B DNA AMP PROBE: CPT | Performed by: ADVANCED PRACTICE MIDWIFE

## 2020-04-15 PROCEDURE — 00000218 ZZHCL STATISTIC OBHBG - HEMOGLOBIN: Performed by: ADVANCED PRACTICE MIDWIFE

## 2020-04-15 PROCEDURE — 99207 ZZC PRENATAL VISIT: CPT | Performed by: ADVANCED PRACTICE MIDWIFE

## 2020-04-15 NOTE — PROGRESS NOTES
36w1d  Caroline is feeling well today. Reports good fetal movement. Denies leaking of fluid, vaginal bleeding, regular uterine contractions, headache, visual changes, or other concerns. She is taking red raspberry leaf tea and evening primrose oil for cervical ripening, but planning IOL at 40w due to AMA. Otherwise no questions. Fetus is cephalic by BSUS today. GBS self swab and Hgb today. Plans to have virtual visit at 37 weeks and will return to clinic at 38 weeks. [facility] is currently recommending against seeing patients in clinic for fetal testing. Reviewed kick counts and when to call.     DANN Castillo CNM

## 2020-04-16 LAB
GP B STREP DNA SPEC QL NAA+PROBE: NEGATIVE
SPECIMEN SOURCE: NORMAL

## 2020-04-22 ENCOUNTER — PRENATAL OFFICE VISIT (OUTPATIENT)
Dept: MIDWIFE SERVICES | Facility: CLINIC | Age: 40
End: 2020-04-22
Payer: COMMERCIAL

## 2020-04-22 DIAGNOSIS — O09.513 AMA (ADVANCED MATERNAL AGE) PRIMIGRAVIDA 35+, THIRD TRIMESTER: Primary | ICD-10-CM

## 2020-04-22 PROCEDURE — 99207 ZZC PRENATAL VISIT: CPT | Performed by: ADVANCED PRACTICE MIDWIFE

## 2020-04-22 NOTE — PROGRESS NOTES
"Caroline Pride is a 39 year old female who is being evaluated via a billable telephone visit.      The patient has been notified of following:     \"This telephone visit will be conducted via a call between you and your physician/provider. We have found that certain health care needs can be provided without the need for a physical exam.  This service lets us provide the care you need with a short phone conversation.  If a prescription is necessary we can send it directly to your pharmacy.  If lab work is needed we can place an order for that and you can then stop by our lab to have the test done at a later time.    Telephone visits are billed at different rates depending on your insurance coverage. During this emergency period, for some insurers they may be billed the same as an in-person visit.  Please reach out to your insurance provider with any questions.    If during the course of the call the physician/provider feels a telephone visit is not appropriate, you will not be charged for this service.\"    Patient has given verbal consent for Telephone visit?  Yes    Patient reports doing well, no concerns. Questions re: [facility], support people, upcoming visits. Waking up with swollen hands/stiff fingers. Wonders if that is r/t elevated BP? Last BP was WNL but borderline. Denies HA, visual changes, epigastric pain. Has a BP cuff at home. Will check BP today and call in results. Discussed recommendation for IOL and delivery before 40 weeks. Pt and partner verbalize understanding but are undecided what they want to do at this time. Baby is active. No regular contractions, LOF or bleeding. RTC in 1 week.    Phone call duration: 15 minutes  DANN Mccabe CNM        "

## 2020-04-28 ENCOUNTER — PRENATAL OFFICE VISIT (OUTPATIENT)
Dept: MIDWIFE SERVICES | Facility: CLINIC | Age: 40
End: 2020-04-28
Payer: COMMERCIAL

## 2020-04-28 VITALS
TEMPERATURE: 97.6 F | WEIGHT: 213.7 LBS | SYSTOLIC BLOOD PRESSURE: 132 MMHG | BODY MASS INDEX: 32.73 KG/M2 | DIASTOLIC BLOOD PRESSURE: 82 MMHG | HEART RATE: 91 BPM

## 2020-04-28 DIAGNOSIS — O09.513 AMA (ADVANCED MATERNAL AGE) PRIMIGRAVIDA 35+, THIRD TRIMESTER: Primary | ICD-10-CM

## 2020-04-28 PROCEDURE — 99207 ZZC PRENATAL VISIT: CPT | Performed by: ADVANCED PRACTICE MIDWIFE

## 2020-04-28 NOTE — PROGRESS NOTES
38w0d  Feeling some swelling, hands feel swollen in hands, reassured, has toddler at home.  Labor signs and symptoms reviewed, knows when to call.   Discussed pain medications in labor and nitrous oxide is not available.   Patient will see how it goes.  Not sleeping well and feels tired throughout the day, discussed ways help with sleep and herbal handouts on insomnia.  Patient is still considering induction at 40 weeks as recommended by MFM.   GBS negative.   rtc in 1 week alexi

## 2020-05-01 ENCOUNTER — HOSPITAL ENCOUNTER (OUTPATIENT)
Facility: CLINIC | Age: 40
End: 2020-05-01
Payer: COMMERCIAL

## 2020-05-05 ENCOUNTER — PRENATAL OFFICE VISIT (OUTPATIENT)
Dept: MIDWIFE SERVICES | Facility: CLINIC | Age: 40
End: 2020-05-05
Payer: COMMERCIAL

## 2020-05-05 VITALS
SYSTOLIC BLOOD PRESSURE: 130 MMHG | HEART RATE: 107 BPM | BODY MASS INDEX: 32.63 KG/M2 | DIASTOLIC BLOOD PRESSURE: 84 MMHG | OXYGEN SATURATION: 97 % | WEIGHT: 213 LBS

## 2020-05-05 DIAGNOSIS — O09.513 AMA (ADVANCED MATERNAL AGE) PRIMIGRAVIDA 35+, THIRD TRIMESTER: Primary | ICD-10-CM

## 2020-05-05 PROCEDURE — 99207 ZZC PRENATAL VISIT: CPT | Performed by: ADVANCED PRACTICE MIDWIFE

## 2020-05-05 NOTE — PROGRESS NOTES
39w0d  Caroline is here by herself with Mila joining by [place]. She is feeling well, but ready to be done. Declines induction of labor at this time. Discussed MFM recommendation, and that she can choose induction of labor at any time. Discussed Covid restrictions and testing upon admission. Conversation about when to come to hospital in labor. Ok to call and have discussion with CNM during labor to decide when to come, [phone] given. Feels that baby is getting big. Declines SVE today. Return to care 1 week.  Irwin Britt CNM

## 2020-05-12 ENCOUNTER — PRENATAL OFFICE VISIT (OUTPATIENT)
Dept: MIDWIFE SERVICES | Facility: CLINIC | Age: 40
End: 2020-05-12
Payer: COMMERCIAL

## 2020-05-12 VITALS
TEMPERATURE: 98.3 F | HEART RATE: 90 BPM | WEIGHT: 216 LBS | SYSTOLIC BLOOD PRESSURE: 141 MMHG | BODY MASS INDEX: 33.09 KG/M2 | DIASTOLIC BLOOD PRESSURE: 94 MMHG

## 2020-05-12 DIAGNOSIS — O13.9 GESTATIONAL HYPERTENSION AFFECTING FIRST PREGNANCY: Primary | ICD-10-CM

## 2020-05-12 LAB
ALT SERPL W P-5'-P-CCNC: 18 U/L (ref 0–50)
AST SERPL W P-5'-P-CCNC: 4 U/L (ref 0–45)
CREAT SERPL-MCNC: 0.53 MG/DL (ref 0.52–1.04)
CREAT UR-MCNC: 37 MG/DL
ERYTHROCYTE [DISTWIDTH] IN BLOOD BY AUTOMATED COUNT: 14.6 % (ref 10–15)
GFR SERPL CREATININE-BSD FRML MDRD: >90 ML/MIN/{1.73_M2}
HCT VFR BLD AUTO: 37.7 % (ref 35–47)
HGB BLD-MCNC: 12.9 G/DL (ref 11.7–15.7)
MCH RBC QN AUTO: 30.6 PG (ref 26.5–33)
MCHC RBC AUTO-ENTMCNC: 34.2 G/DL (ref 31.5–36.5)
MCV RBC AUTO: 90 FL (ref 78–100)
PLATELET # BLD AUTO: 202 10E9/L (ref 150–450)
PROT UR-MCNC: 0.11 G/L
PROT/CREAT 24H UR: 0.31 G/G CR (ref 0–0.2)
RBC # BLD AUTO: 4.21 10E12/L (ref 3.8–5.2)
URATE SERPL-MCNC: 4.6 MG/DL (ref 2.6–6)
WBC # BLD AUTO: 12 10E9/L (ref 4–11)

## 2020-05-12 PROCEDURE — 84460 ALANINE AMINO (ALT) (SGPT): CPT | Performed by: ADVANCED PRACTICE MIDWIFE

## 2020-05-12 PROCEDURE — 36415 COLL VENOUS BLD VENIPUNCTURE: CPT | Performed by: ADVANCED PRACTICE MIDWIFE

## 2020-05-12 PROCEDURE — 84450 TRANSFERASE (AST) (SGOT): CPT | Performed by: ADVANCED PRACTICE MIDWIFE

## 2020-05-12 PROCEDURE — 82565 ASSAY OF CREATININE: CPT | Performed by: ADVANCED PRACTICE MIDWIFE

## 2020-05-12 PROCEDURE — 99207 ZZC PRENATAL VISIT: CPT | Performed by: ADVANCED PRACTICE MIDWIFE

## 2020-05-12 PROCEDURE — 84550 ASSAY OF BLOOD/URIC ACID: CPT | Performed by: ADVANCED PRACTICE MIDWIFE

## 2020-05-12 PROCEDURE — 85027 COMPLETE CBC AUTOMATED: CPT | Performed by: ADVANCED PRACTICE MIDWIFE

## 2020-05-12 PROCEDURE — 59025 FETAL NON-STRESS TEST: CPT | Performed by: ADVANCED PRACTICE MIDWIFE

## 2020-05-12 PROCEDURE — 84156 ASSAY OF PROTEIN URINE: CPT | Performed by: ADVANCED PRACTICE MIDWIFE

## 2020-05-12 NOTE — PROGRESS NOTES
40w0d  Caroline has elevated BP today of 141/94 and has had some borderline but not elevated pressures this pregnancy. Ordered preeclampsia labs. Discussed recommendation of IOL for AMA of 39 years old including indication of increasing risk of stillbirth. Caroline has her wife Mila on facetime and they discuss IOL. They voice understanding of risks discussed, and feel they would like to avoid IOL. Caroline is also feeling decreased fetal movement. NST today. Declines SVE.    Denies leaking of fluid, vaginal bleeding, regular uterine contractions, headache, visual changes, or other concerns.     NST is reactive; baseline: 135; variability: moderate; accels: present; decels: absent; start time: 1717; stop time: 1740    Advised BPP and blood pressure check tomorrow. Caroline will call in the morning to schedule. She is considering IOL on Thursday but is not certain yet. CNM team will call with preeclampsia lab results.    DANN Castillo CNM

## 2020-05-13 ENCOUNTER — OFFICE VISIT (OUTPATIENT)
Dept: MIDWIFE SERVICES | Facility: CLINIC | Age: 40
End: 2020-05-13
Payer: COMMERCIAL

## 2020-05-13 ENCOUNTER — HOSPITAL ENCOUNTER (INPATIENT)
Facility: CLINIC | Age: 40
LOS: 3 days | Discharge: HOME HEALTH | End: 2020-05-16
Attending: ADVANCED PRACTICE MIDWIFE | Admitting: ADVANCED PRACTICE MIDWIFE
Payer: COMMERCIAL

## 2020-05-13 VITALS
BODY MASS INDEX: 32.95 KG/M2 | SYSTOLIC BLOOD PRESSURE: 140 MMHG | DIASTOLIC BLOOD PRESSURE: 78 MMHG | TEMPERATURE: 98 F | WEIGHT: 215.1 LBS

## 2020-05-13 DIAGNOSIS — O14.93 PREECLAMPSIA, THIRD TRIMESTER: ICD-10-CM

## 2020-05-13 DIAGNOSIS — O09.513 AMA (ADVANCED MATERNAL AGE) PRIMIGRAVIDA 35+, THIRD TRIMESTER: Primary | ICD-10-CM

## 2020-05-13 LAB
ABO + RH BLD: NORMAL
ABO + RH BLD: NORMAL
ALT SERPL W P-5'-P-CCNC: 18 U/L (ref 0–50)
AST SERPL W P-5'-P-CCNC: 5 U/L (ref 0–45)
BASOPHILS # BLD AUTO: 0 10E9/L (ref 0–0.2)
BASOPHILS NFR BLD AUTO: 0.1 %
BLD GP AB SCN SERPL QL: NORMAL
BLOOD BANK CMNT PATIENT-IMP: NORMAL
CREAT SERPL-MCNC: 0.48 MG/DL (ref 0.52–1.04)
CREAT UR-MCNC: 113 MG/DL
DIFFERENTIAL METHOD BLD: ABNORMAL
EOSINOPHIL # BLD AUTO: 0.1 10E9/L (ref 0–0.7)
EOSINOPHIL NFR BLD AUTO: 0.9 %
ERYTHROCYTE [DISTWIDTH] IN BLOOD BY AUTOMATED COUNT: 13.9 % (ref 10–15)
GFR SERPL CREATININE-BSD FRML MDRD: >90 ML/MIN/{1.73_M2}
HCT VFR BLD AUTO: 35.2 % (ref 35–47)
HGB BLD-MCNC: 12 G/DL (ref 11.7–15.7)
IMM GRANULOCYTES # BLD: 0 10E9/L (ref 0–0.4)
IMM GRANULOCYTES NFR BLD: 0.4 %
LYMPHOCYTES # BLD AUTO: 2.5 10E9/L (ref 0.8–5.3)
LYMPHOCYTES NFR BLD AUTO: 22.2 %
MCH RBC QN AUTO: 30.4 PG (ref 26.5–33)
MCHC RBC AUTO-ENTMCNC: 34.1 G/DL (ref 31.5–36.5)
MCV RBC AUTO: 89 FL (ref 78–100)
MONOCYTES # BLD AUTO: 0.6 10E9/L (ref 0–1.3)
MONOCYTES NFR BLD AUTO: 5.6 %
NEUTROPHILS # BLD AUTO: 8.1 10E9/L (ref 1.6–8.3)
NEUTROPHILS NFR BLD AUTO: 70.8 %
NRBC # BLD AUTO: 0 10*3/UL
NRBC BLD AUTO-RTO: 0 /100
PLATELET # BLD AUTO: 207 10E9/L (ref 150–450)
PROT UR-MCNC: 0.27 G/L
PROT/CREAT 24H UR: 0.24 G/G CR (ref 0–0.2)
RBC # BLD AUTO: 3.95 10E12/L (ref 3.8–5.2)
SARS-COV-2 PCR COMMENT: NORMAL
SARS-COV-2 RNA SPEC QL NAA+PROBE: NEGATIVE
SARS-COV-2 RNA SPEC QL NAA+PROBE: NORMAL
SPECIMEN EXP DATE BLD: NORMAL
SPECIMEN SOURCE: NORMAL
SPECIMEN SOURCE: NORMAL
WBC # BLD AUTO: 11.4 10E9/L (ref 4–11)

## 2020-05-13 PROCEDURE — 84156 ASSAY OF PROTEIN URINE: CPT | Performed by: ADVANCED PRACTICE MIDWIFE

## 2020-05-13 PROCEDURE — 82565 ASSAY OF CREATININE: CPT | Performed by: ADVANCED PRACTICE MIDWIFE

## 2020-05-13 PROCEDURE — 86900 BLOOD TYPING SEROLOGIC ABO: CPT | Performed by: ADVANCED PRACTICE MIDWIFE

## 2020-05-13 PROCEDURE — 87635 SARS-COV-2 COVID-19 AMP PRB: CPT | Performed by: ADVANCED PRACTICE MIDWIFE

## 2020-05-13 PROCEDURE — 12000001 ZZH R&B MED SURG/OB UMMC

## 2020-05-13 PROCEDURE — 85025 COMPLETE CBC W/AUTO DIFF WBC: CPT | Performed by: ADVANCED PRACTICE MIDWIFE

## 2020-05-13 PROCEDURE — 84450 TRANSFERASE (AST) (SGOT): CPT | Performed by: ADVANCED PRACTICE MIDWIFE

## 2020-05-13 PROCEDURE — 86780 TREPONEMA PALLIDUM: CPT | Performed by: ADVANCED PRACTICE MIDWIFE

## 2020-05-13 PROCEDURE — 25000132 ZZH RX MED GY IP 250 OP 250 PS 637: Performed by: ADVANCED PRACTICE MIDWIFE

## 2020-05-13 PROCEDURE — 36415 COLL VENOUS BLD VENIPUNCTURE: CPT | Performed by: ADVANCED PRACTICE MIDWIFE

## 2020-05-13 PROCEDURE — 84460 ALANINE AMINO (ALT) (SGPT): CPT | Performed by: ADVANCED PRACTICE MIDWIFE

## 2020-05-13 PROCEDURE — 86850 RBC ANTIBODY SCREEN: CPT | Performed by: ADVANCED PRACTICE MIDWIFE

## 2020-05-13 PROCEDURE — 3E0P7VZ INTRODUCTION OF HORMONE INTO FEMALE REPRODUCTIVE, VIA NATURAL OR ARTIFICIAL OPENING: ICD-10-PCS | Performed by: ADVANCED PRACTICE MIDWIFE

## 2020-05-13 PROCEDURE — 99207 ZZC PRENATAL VISIT: CPT | Performed by: ADVANCED PRACTICE MIDWIFE

## 2020-05-13 PROCEDURE — 86901 BLOOD TYPING SEROLOGIC RH(D): CPT | Performed by: ADVANCED PRACTICE MIDWIFE

## 2020-05-13 RX ORDER — CARBOPROST TROMETHAMINE 250 UG/ML
250 INJECTION, SOLUTION INTRAMUSCULAR
Status: DISCONTINUED | OUTPATIENT
Start: 2020-05-13 | End: 2020-05-14

## 2020-05-13 RX ORDER — HYDROXYZINE HYDROCHLORIDE 50 MG/1
100 TABLET, FILM COATED ORAL ONCE
Status: COMPLETED | OUTPATIENT
Start: 2020-05-13 | End: 2020-05-13

## 2020-05-13 RX ORDER — METHYLERGONOVINE MALEATE 0.2 MG/ML
200 INJECTION INTRAVENOUS
Status: DISCONTINUED | OUTPATIENT
Start: 2020-05-13 | End: 2020-05-14

## 2020-05-13 RX ORDER — FENTANYL CITRATE 50 UG/ML
50-100 INJECTION, SOLUTION INTRAMUSCULAR; INTRAVENOUS
Status: DISCONTINUED | OUTPATIENT
Start: 2020-05-13 | End: 2020-05-14

## 2020-05-13 RX ORDER — MISOPROSTOL 100 UG/1
25 TABLET ORAL EVERY 4 HOURS PRN
Status: DISCONTINUED | OUTPATIENT
Start: 2020-05-13 | End: 2020-05-14

## 2020-05-13 RX ORDER — TERBUTALINE SULFATE 1 MG/ML
0.25 INJECTION, SOLUTION SUBCUTANEOUS
Status: DISCONTINUED | OUTPATIENT
Start: 2020-05-13 | End: 2020-05-14

## 2020-05-13 RX ORDER — OXYTOCIN 10 [USP'U]/ML
10 INJECTION, SOLUTION INTRAMUSCULAR; INTRAVENOUS
Status: DISCONTINUED | OUTPATIENT
Start: 2020-05-13 | End: 2020-05-14

## 2020-05-13 RX ORDER — EVE PRIMROSE/LINOLEIC/G-LENIC 1000 MG
1200 CAPSULE ORAL DAILY
COMMUNITY
End: 2022-07-27

## 2020-05-13 RX ORDER — OXYTOCIN/0.9 % SODIUM CHLORIDE 30/500 ML
100-340 PLASTIC BAG, INJECTION (ML) INTRAVENOUS CONTINUOUS PRN
Status: COMPLETED | OUTPATIENT
Start: 2020-05-13 | End: 2020-05-14

## 2020-05-13 RX ORDER — SODIUM CHLORIDE, SODIUM LACTATE, POTASSIUM CHLORIDE, CALCIUM CHLORIDE 600; 310; 30; 20 MG/100ML; MG/100ML; MG/100ML; MG/100ML
INJECTION, SOLUTION INTRAVENOUS CONTINUOUS
Status: DISCONTINUED | OUTPATIENT
Start: 2020-05-13 | End: 2020-05-14

## 2020-05-13 RX ORDER — OXYCODONE AND ACETAMINOPHEN 5; 325 MG/1; MG/1
1 TABLET ORAL
Status: DISCONTINUED | OUTPATIENT
Start: 2020-05-13 | End: 2020-05-14

## 2020-05-13 RX ORDER — ACETAMINOPHEN 325 MG/1
650 TABLET ORAL EVERY 4 HOURS PRN
Status: DISCONTINUED | OUTPATIENT
Start: 2020-05-13 | End: 2020-05-14

## 2020-05-13 RX ORDER — IBUPROFEN 800 MG/1
800 TABLET, FILM COATED ORAL
Status: DISCONTINUED | OUTPATIENT
Start: 2020-05-13 | End: 2020-05-14

## 2020-05-13 RX ORDER — ONDANSETRON 2 MG/ML
4 INJECTION INTRAMUSCULAR; INTRAVENOUS EVERY 6 HOURS PRN
Status: DISCONTINUED | OUTPATIENT
Start: 2020-05-13 | End: 2020-05-14

## 2020-05-13 RX ORDER — NALOXONE HYDROCHLORIDE 0.4 MG/ML
.1-.4 INJECTION, SOLUTION INTRAMUSCULAR; INTRAVENOUS; SUBCUTANEOUS
Status: DISCONTINUED | OUTPATIENT
Start: 2020-05-13 | End: 2020-05-14

## 2020-05-13 RX ADMIN — HYDROXYZINE HYDROCHLORIDE 100 MG: 50 TABLET, FILM COATED ORAL at 23:14

## 2020-05-13 RX ADMIN — Medication 25 MCG: at 21:21

## 2020-05-13 NOTE — PROGRESS NOTES
40w1d  Caroline is here for blood pressure check. She has two mild range elevated blood pressures and protein:creatinine ratio is elevated at 0.31. As such, she has a diagnosis of preeclampsia without severe features. Reports good fetal movement. Denies leaking of fluid, vaginal bleeding, regular uterine contractions, headache, visual changes, or other concerns. Recommended IOL today and Caroline agrees. Scheduled to arrive at 7pm tonight. COVID-19 test collected in clinic today, per [facility] guidelines.    DANN Castillo CNM

## 2020-05-14 ENCOUNTER — ANESTHESIA EVENT (OUTPATIENT)
Dept: OBGYN | Facility: CLINIC | Age: 40
End: 2020-05-14
Payer: COMMERCIAL

## 2020-05-14 ENCOUNTER — ANESTHESIA (OUTPATIENT)
Dept: OBGYN | Facility: CLINIC | Age: 40
End: 2020-05-14
Payer: COMMERCIAL

## 2020-05-14 LAB — T PALLIDUM AB SER QL: NONREACTIVE

## 2020-05-14 PROCEDURE — 12000001 ZZH R&B MED SURG/OB UMMC

## 2020-05-14 PROCEDURE — 59400 OBSTETRICAL CARE: CPT | Performed by: ADVANCED PRACTICE MIDWIFE

## 2020-05-14 PROCEDURE — 25000125 ZZHC RX 250: Performed by: ADVANCED PRACTICE MIDWIFE

## 2020-05-14 PROCEDURE — 25000132 ZZH RX MED GY IP 250 OP 250 PS 637: Performed by: ADVANCED PRACTICE MIDWIFE

## 2020-05-14 PROCEDURE — 25000128 H RX IP 250 OP 636: Performed by: ANESTHESIOLOGY

## 2020-05-14 PROCEDURE — 25800030 ZZH RX IP 258 OP 636: Performed by: ADVANCED PRACTICE MIDWIFE

## 2020-05-14 PROCEDURE — 72200001 ZZH LABOR CARE VAGINAL DELIVERY SINGLE

## 2020-05-14 PROCEDURE — 40000275 ZZH STATISTIC RCP TIME EA 10 MIN

## 2020-05-14 PROCEDURE — 25000125 ZZHC RX 250: Performed by: ANESTHESIOLOGY

## 2020-05-14 PROCEDURE — 3E0R3BZ INTRODUCTION OF ANESTHETIC AGENT INTO SPINAL CANAL, PERCUTANEOUS APPROACH: ICD-10-PCS | Performed by: ANESTHESIOLOGY

## 2020-05-14 PROCEDURE — 00HU33Z INSERTION OF INFUSION DEVICE INTO SPINAL CANAL, PERCUTANEOUS APPROACH: ICD-10-PCS | Performed by: ANESTHESIOLOGY

## 2020-05-14 PROCEDURE — 40000977 ZZH STATISTIC ATTENDANCE AT DELIVERY

## 2020-05-14 PROCEDURE — 0KQM0ZZ REPAIR PERINEUM MUSCLE, OPEN APPROACH: ICD-10-PCS | Performed by: ADVANCED PRACTICE MIDWIFE

## 2020-05-14 RX ORDER — MODIFIED LANOLIN
OINTMENT (GRAM) TOPICAL
Status: DISCONTINUED | OUTPATIENT
Start: 2020-05-14 | End: 2020-05-16 | Stop reason: HOSPADM

## 2020-05-14 RX ORDER — LIDOCAINE HYDROCHLORIDE 10 MG/ML
INJECTION, SOLUTION EPIDURAL; INFILTRATION; INTRACAUDAL; PERINEURAL
Status: DISCONTINUED
Start: 2020-05-14 | End: 2020-05-14 | Stop reason: HOSPADM

## 2020-05-14 RX ORDER — OXYTOCIN/0.9 % SODIUM CHLORIDE 30/500 ML
PLASTIC BAG, INJECTION (ML) INTRAVENOUS
Status: DISCONTINUED
Start: 2020-05-14 | End: 2020-05-14 | Stop reason: HOSPADM

## 2020-05-14 RX ORDER — NALOXONE HYDROCHLORIDE 0.4 MG/ML
.1-.4 INJECTION, SOLUTION INTRAMUSCULAR; INTRAVENOUS; SUBCUTANEOUS
Status: DISCONTINUED | OUTPATIENT
Start: 2020-05-14 | End: 2020-05-16 | Stop reason: HOSPADM

## 2020-05-14 RX ORDER — LIDOCAINE HYDROCHLORIDE AND EPINEPHRINE 15; 5 MG/ML; UG/ML
INJECTION, SOLUTION EPIDURAL PRN
Status: DISCONTINUED | OUTPATIENT
Start: 2020-05-14 | End: 2022-07-27

## 2020-05-14 RX ORDER — IBUPROFEN 800 MG/1
800 TABLET, FILM COATED ORAL EVERY 6 HOURS PRN
Status: DISCONTINUED | OUTPATIENT
Start: 2020-05-14 | End: 2020-05-16 | Stop reason: HOSPADM

## 2020-05-14 RX ORDER — EPHEDRINE SULFATE 50 MG/ML
5 INJECTION, SOLUTION INTRAMUSCULAR; INTRAVENOUS; SUBCUTANEOUS
Status: DISCONTINUED | OUTPATIENT
Start: 2020-05-14 | End: 2020-05-14

## 2020-05-14 RX ORDER — HYDROCORTISONE 2.5 %
CREAM (GRAM) TOPICAL 3 TIMES DAILY PRN
Status: DISCONTINUED | OUTPATIENT
Start: 2020-05-14 | End: 2020-05-16 | Stop reason: HOSPADM

## 2020-05-14 RX ORDER — AMOXICILLIN 250 MG
2 CAPSULE ORAL 2 TIMES DAILY
Status: DISCONTINUED | OUTPATIENT
Start: 2020-05-14 | End: 2020-05-16 | Stop reason: HOSPADM

## 2020-05-14 RX ORDER — OXYTOCIN 10 [USP'U]/ML
10 INJECTION, SOLUTION INTRAMUSCULAR; INTRAVENOUS
Status: DISCONTINUED | OUTPATIENT
Start: 2020-05-14 | End: 2020-05-16 | Stop reason: HOSPADM

## 2020-05-14 RX ORDER — MISOPROSTOL 200 UG/1
TABLET ORAL
Status: DISCONTINUED
Start: 2020-05-14 | End: 2020-05-14 | Stop reason: HOSPADM

## 2020-05-14 RX ORDER — BISACODYL 10 MG
10 SUPPOSITORY, RECTAL RECTAL DAILY PRN
Status: DISCONTINUED | OUTPATIENT
Start: 2020-05-16 | End: 2020-05-16 | Stop reason: HOSPADM

## 2020-05-14 RX ORDER — NALBUPHINE HYDROCHLORIDE 20 MG/ML
2.5-5 INJECTION, SOLUTION INTRAMUSCULAR; INTRAVENOUS; SUBCUTANEOUS EVERY 6 HOURS PRN
Status: DISCONTINUED | OUTPATIENT
Start: 2020-05-14 | End: 2020-05-14

## 2020-05-14 RX ORDER — ACETAMINOPHEN 325 MG/1
650 TABLET ORAL EVERY 4 HOURS PRN
Status: DISCONTINUED | OUTPATIENT
Start: 2020-05-14 | End: 2020-05-16 | Stop reason: HOSPADM

## 2020-05-14 RX ORDER — OXYTOCIN/0.9 % SODIUM CHLORIDE 30/500 ML
100 PLASTIC BAG, INJECTION (ML) INTRAVENOUS CONTINUOUS
Status: DISCONTINUED | OUTPATIENT
Start: 2020-05-14 | End: 2020-05-16 | Stop reason: HOSPADM

## 2020-05-14 RX ORDER — AMOXICILLIN 250 MG
1 CAPSULE ORAL 2 TIMES DAILY
Status: DISCONTINUED | OUTPATIENT
Start: 2020-05-14 | End: 2020-05-16 | Stop reason: HOSPADM

## 2020-05-14 RX ORDER — MISOPROSTOL 200 UG/1
400 TABLET ORAL
Status: DISCONTINUED | OUTPATIENT
Start: 2020-05-14 | End: 2020-05-16 | Stop reason: HOSPADM

## 2020-05-14 RX ORDER — OXYTOCIN/0.9 % SODIUM CHLORIDE 30/500 ML
340 PLASTIC BAG, INJECTION (ML) INTRAVENOUS CONTINUOUS PRN
Status: DISCONTINUED | OUTPATIENT
Start: 2020-05-14 | End: 2020-05-16 | Stop reason: HOSPADM

## 2020-05-14 RX ORDER — OXYTOCIN 10 [USP'U]/ML
INJECTION, SOLUTION INTRAMUSCULAR; INTRAVENOUS
Status: DISCONTINUED
Start: 2020-05-14 | End: 2020-05-14 | Stop reason: WASHOUT

## 2020-05-14 RX ORDER — NALOXONE HYDROCHLORIDE 0.4 MG/ML
.1-.4 INJECTION, SOLUTION INTRAMUSCULAR; INTRAVENOUS; SUBCUTANEOUS
Status: DISCONTINUED | OUTPATIENT
Start: 2020-05-14 | End: 2020-05-14

## 2020-05-14 RX ADMIN — LIDOCAINE HYDROCHLORIDE,EPINEPHRINE BITARTRATE 3 ML: 15; .005 INJECTION, SOLUTION EPIDURAL; INFILTRATION; INTRACAUDAL; PERINEURAL at 03:40

## 2020-05-14 RX ADMIN — IBUPROFEN 800 MG: 800 TABLET, FILM COATED ORAL at 19:56

## 2020-05-14 RX ADMIN — Medication 340 ML/HR: at 06:34

## 2020-05-14 RX ADMIN — LIDOCAINE HYDROCHLORIDE 10 ML: 10 INJECTION, SOLUTION EPIDURAL; INFILTRATION; INTRACAUDAL; PERINEURAL at 06:45

## 2020-05-14 RX ADMIN — Medication: at 07:02

## 2020-05-14 RX ADMIN — Medication: at 03:41

## 2020-05-14 RX ADMIN — SODIUM CHLORIDE, POTASSIUM CHLORIDE, SODIUM LACTATE AND CALCIUM CHLORIDE: 600; 310; 30; 20 INJECTION, SOLUTION INTRAVENOUS at 05:15

## 2020-05-14 RX ADMIN — SODIUM CHLORIDE, POTASSIUM CHLORIDE, SODIUM LACTATE AND CALCIUM CHLORIDE 1000 ML: 600; 310; 30; 20 INJECTION, SOLUTION INTRAVENOUS at 03:14

## 2020-05-14 RX ADMIN — ACETAMINOPHEN 650 MG: 325 TABLET, FILM COATED ORAL at 23:27

## 2020-05-14 RX ADMIN — DOCUSATE SODIUM AND SENNOSIDES 2 TABLET: 8.6; 5 TABLET, FILM COATED ORAL at 19:56

## 2020-05-14 RX ADMIN — Medication 10 ML/HR: at 03:43

## 2020-05-14 ASSESSMENT — ENCOUNTER SYMPTOMS: SEIZURES: 0

## 2020-05-14 NOTE — PLAN OF CARE
AFVSS. Patient sleeping between cares after receiving hydroxyzine for sleep. Noted to have contractions, not tracing well. Monitors readjusted. Patient repositioned. Contractions every 2-4 minutes on the monitor. Patient sleeping. Assisted patient up to the bathroom. Patient repositioned. Continues to have contractions every 2-4 minutes. Patient due for next dose of misoprostol, but is andre too frequently. Plan per CNM, is to allow patient to sleep for an hour and reassess for additional dose of misoprostol or will place ohara. Continue present cares.

## 2020-05-14 NOTE — PLAN OF CARE
Late entry for 0545  Dr. Cat called to patients bedside to evaluate category 2 fetal tracing. RN concern for fetus with minimal variability and repetitive late and variable decelerations. Unable to trace contractions despite numerous attempts to reposition monitor. Contractions document through palpation. Patient appears to have uterine tachysystole as well. Dr. Cat reviewed tracing. Pushed with patient at the bedside. Plan per Dr. Cat is to continue pushing. States variability moderate, patient making progress but is not yet able to have VE. States no need at this time for tocolytic. Smiley Nath CNM also at bedside and agrees with plan. Continue to assist with pushing and position changes and assess progress.

## 2020-05-14 NOTE — PROGRESS NOTES
Paged by RN at 0517 with request from JESSE Nath to come to patient room and evaluate FHT.     Caroline Pride is a 39 year old  at 40w2d by LMP c/w 12w5d us who initially presented for induction of labor due to diagnosis of pre-eclampsia without severe features.  On initially exam she was 1/60/posterior/-2 at  on .  She had one dose of vaginal misoprostol at 2121 and began regularly andre.  Patient became more uncomfortable and epidural was placed at 0318.  She made quick progress to 9/90/-2 at 0419 and was 10/100/-1 at 0514.  When I arrived to room she had been starting pushing.  Currently trying repositioning and IV fluid bolus for Category 2 FHT intrauterine resuscitation.    On review of FHT, late decelerations beginning about 0400.  Between 0400 and 0440, difficult to characterize decelerations due to suboptimal toco tracing.  IUPC was placed at 0457 with improvement in tracing of contractions.  Late and variable decelerations seen at this point.      On assessment patient is 10/100/0 without pushing and pushing to +1 with good maternal effort.  Following IV fluid bolus improvement in variability to moderate variability.       Baseline 150/ moderate variability/ accelerations present/ few variable decelerations, some late decelerations.      Overall Category 2, reactive FHT at this time.  Variability and frequency of decelerations decreased with intrauterine resuscitation.  Since patient continues to make progress may continue to push.  At this time she is not a candidate for a VAVD.      Per Category 2 FHT FHT algorithm:    Patient with moderate variability and accelerations.  Significant decelerations with >=50% of contractions for 1 hour-yes  Second stage labor, normal progress, continue to observe.    Joya Cat MD

## 2020-05-14 NOTE — H&P
ADMIT NOTE  =================  40w1d  Caroline Pride is a 39 year old female     with an Patient's last menstrual period was 2019. and Estimated Date of Delivery: May 12, 2020 [facility] at 7:52 PM in for induction of labor.  Indication AMA over 40 years and pre-eclampsia.   Fetal movement- active  ROM- no   GBS- negative    HPI  ================  Patient arrived with wife Mila for IOL secondary to AMA over 40 years and pre-eclampsia.   Partner- is involved, Mila, sperm donor   Other labor support- none     PRENATAL COURSE  =================  Prenatal course was   complicated by    Patient Active Problem List    Diagnosis Date Noted     Labor and delivery, indication for care 2020     Priority: Medium     AMA (advanced maternal age) primigravida 35+, first trimester 10/22/2019     Priority: Medium     10/30/19: 12w1d normal nuchal translucency, nasal bone visualized       Need for Tdap vaccination 10/08/2019     Priority: Medium     Subclinical hypothyroidism 2018     Priority: Medium     Vitamin D deficiency 2018     Priority: Medium     GLORIA (generalized anxiety disorder) 2017     Priority: Medium     Other procreative management counseling and advice 2015     Priority: Medium        HISTORIES  ============  No Known Allergies  Past Medical History:   Diagnosis Date     PCOS (polycystic ovarian syndrome)      History reviewed. No pertinent surgical history..  Family History   Problem Relation Age of Onset     Thyroid Disease Mother      Anemia Mother      Depression Father      Anxiety Disorder Father      Heart Disease Father      Hypertension Sister      Anxiety Disorder Sister      Thyroid Disease Sister      Breast Cancer Maternal Grandmother          at 42y     Parkinsonism Maternal Uncle      Social History     Tobacco Use     Smoking status: Former Smoker     Packs/day: 0.00     Last attempt to quit: 2019     Years since  quittin.3     Smokeless tobacco: Never Used   Substance Use Topics     Alcohol use: Yes     Comment: occasional     OB History    Para Term  AB Living   1 0 0 0 0 0   SAB TAB Ectopic Multiple Live Births   0 0 0 0 0      # Outcome Date GA Lbr Fabricio/2nd Weight Sex Delivery Anes PTL Lv   1 Current                 LABS:   ===========  Rhogam not indicated  Lab Results   Component Value Date    ABO A 10/08/2019       Lab Results   Component Value Date    RH Pos 10/08/2019     No results found for: RUBELLAABIGG   Anti-Treponemia: negative   No results found for: HIV  Lab Results   Component Value Date    HGB 12.9 2020      Lab Results   Component Value Date    HEPBANG Nonreactive 10/08/2019     Lab Results   Component Value Date    GBS Negative 04/15/2020     1 hr GCT= 139  1 hr GTT= 97, 148, 100, 91    other labs- PIH labs, WNL except elevated protein 0.31  ROS  =========  Pt denies significant respiratory, cardiovacular, GI, or muscular/skeletalcomplaints.    See RN data base ROS.     PHYSICAL EXAM:  ===============  Last menstrual period 2019, not currently breastfeeding.  General appearance: comfortable  Heart: RRR without murmur  Lungs: clear to auscultation   Neuro: denies headache and visual disturbances  Psych: Mentation normal and bright   Legs: 2+/2+, no clonus, no edema      Abdomen: gravid, single vertex fetus, non-tender between contractions.  EFW-  8 lbs.   CONTACTIONS: Contractions none.    FETAL HEART TONES: baseline 130 with moderate FHR variability and  pos accelerations. No decelerations present.      PELVIC EXAM:  60/ Posterior/ soft/ -2, cephalic by BSUS  MUHAM[place]AD SCORE: 5  BLOODY SHOW: no   ROM: No  FLUID: none  ROM Plus: not done    ASSESSMENT:  ==============  IUP @ 40w1d in for induction of labor.  Indication AMA over 40 years, pre-eclampsia    NST REACTIVE  Fetal Heart rate tracing Category category one  GBS- negative     PLAN:  ===========  Admit - see IP  orders  Cervical ripening with misoprostol  Pain medication if patient desires   Anticipate      DANN Cassidy CNM

## 2020-05-14 NOTE — PROVIDER NOTIFICATION
05/14/20 0407   Provider Notification   Provider Name/Title Smiley Nath CNM   Method of Notification Electronic Page   Request Evaluate in Person   Notification Reason Decels   Fetal heart tracing with late decels despite position changes and iv fluid. Could you come evaluate.

## 2020-05-14 NOTE — PROVIDER NOTIFICATION
05/14/20 0437   Provider Notification   Provider Name/Title SONAL Nath CNM   Method of Notification In Department   Request Evaluate in Person   Called to RENNY to place IUPC as unable to trace contractions and fetus had prolonged deceleration

## 2020-05-14 NOTE — PROGRESS NOTES
S: Patient is doing well. Up and moving around the room now. Feels like her contractions are strong and regular. Desires an epidural. Anesthesia notified of her desire, will come place. Declines a cervical check prior to epidural.      O:  Blood pressure 127/76, temperature 98.6  F (37  C), temperature source Oral, resp. rate 16, last menstrual period 2019, not currently breastfeeding.  General appearance: uncomfortable with contractions    CONTACTIONS: Contractions every 2-3 minutes.  Palpate: moderate  FETAL HEART TONES: baseline 135 with moderate FHR variability and    accelerations yes. Decelerations yes.    NST: REACTIVE  ROM: patient reports some small leaking   PELVIC EXAM:deferred  Fetal Position: cephalic    Bloody show: No  Pitocin- none,  Antibiotics- none  Cervical ripening: N/A    ASSESSMENT:  ==============  IUP @ 40w2d early labor after induction for AMA over 40 and pre-eclampsia   Fetal Heart rate tracing Category category one  GBS- negative     PLAN:  ===========  Comfort measures prn   Pain medication with epidural   Anticipate   Continue labor induction as needed with Pitocin or ohara balloon or both after epidural placement and cervical check   Reevaluate PRN     DANN Cassidy CNM

## 2020-05-14 NOTE — ANESTHESIA PREPROCEDURE EVALUATION
"Anesthesia Pre-Procedure Evaluation    Patient: Caroline Pride   MRN:     [id number] Gender:   female   Age:    39 year old :      1980        Preoperative Diagnosis: * No pre-op diagnosis entered *   * No procedures listed *     LABS:  CBC:   Lab Results   Component Value Date    WBC 11.4 (H) 2020    WBC 12.0 (H) 2020    HGB 12.0 2020    HGB 12.9 2020    HCT 35.2 2020    HCT 37.7 2020     2020     2020     BMP:   Lab Results   Component Value Date     2017    POTASSIUM 4.2 2017    CHLORIDE 108 2017    CO2 24 2017    BUN 13 2017    CR 0.48 (L) 2020    CR 0.53 2020     (H) 2017     COAGS: No results found for: PTT, INR, FIBR  POC: No results found for: BGM, HCG, HCGS  OTHER:   Lab Results   Component Value Date    A1C 5.0 10/22/2019    LAURE 9.0 2017    ALBUMIN 3.7 2017    PROTTOTAL 8.0 2017    ALT 18 2020    AST 5 2020    ALKPHOS 52 2017    BILITOTAL 0.4 2017    TSH 1.83 2020    T4 1.01 2017        Preop Vitals    BP Readings from Last 3 Encounters:   20 127/76   20 (!) 140/78   20 (!) 141/94    Pulse Readings from Last 3 Encounters:   20 90   20 107   20 91      Resp Readings from Last 3 Encounters:   20 16    SpO2 Readings from Last 3 Encounters:   20 97%   04/15/20 97%   20 96%      Temp Readings from Last 1 Encounters:   20 37  C (98.6  F) (Oral)    Ht Readings from Last 1 Encounters:   20 1.721 m (5' 7.75\")      Wt Readings from Last 1 Encounters:   20 97.6 kg (215 lb 1.6 oz)    Estimated body mass index is 32.95 kg/m  as calculated from the following:    Height as of 20: 1.721 m (5' 7.75\").    Weight as of an earlier encounter on 20: 97.6 kg (215 lb 1.6 oz).     LDA:  Peripheral IV 20 Right Hand (Active)   Number of days: 1     "   Intrathecal/Epidural Catheter 05/14/20 (Active)   Number of days: 0        Past Medical History:   Diagnosis Date     PCOS (polycystic ovarian syndrome)       History reviewed. No pertinent surgical history.   No Known Allergies     Anesthesia Evaluation       history and physical reviewed .      No history of anesthetic complications          ROS/MED HX    ENT/Pulmonary:  - neg pulmonary ROS     Neurologic:  - neg neurologic ROS    (-) seizures   Cardiovascular:  - neg cardiovascular ROS       METS/Exercise Tolerance:     Hematologic:         Musculoskeletal:         GI/Hepatic:  - neg GI/hepatic ROS      (-) GERD   Renal/Genitourinary:         Endo:         Psychiatric:         Infectious Disease:         Malignancy:         Other:                     JZG FV AN PHYSICAL EXAM    JZG FV AN PLAN NO PONV RULE    neg OB ROS             Cameron Dowling DO

## 2020-05-14 NOTE — PROGRESS NOTES
S: Patient is doing well. Resting. No questions or concerns at this time. Will place a second dose of cytote or ohara balloon but wait until 3 am. It is important to her to get some sleep and sleeping well right now. Feels like something might be happening but nothing enough to keep her from sleeping. She is having consistent contractions on the monitor. Will see if they increase or resolve. If they continue will place ohara and if they decrease will do a second dose of cytotec.     O:  Blood pressure 128/85, temperature 98.6  F (37  C), temperature source Oral, resp. rate 20, last menstrual period 2019, not currently breastfeeding.  General appearance: comfortable    CONTACTIONS: Contractions irregular, every 2-4.  Palpate: mild  FETAL HEART TONES: baseline 145 with moderate FHR variability and    accelerations yes. Decelerations no.    NST: REACTIVE  ROM: not ruptured  PELVIC EXAM:deferred  Fetal Position: cephalic  Bloody show: No  Pitocin- none,  Antibiotics- none  Cervical ripening: misoprostol    ASSESSMENT:  ==============  IUP @ 40w2d IOL for AMA over 40 years, pre-eclampsia    Fetal Heart rate tracing Category category one  GBS- negative     PLAN:  ===========  Comfort measures prn   Cervical ripening with misoprostol  Pain medication if patient desires  Anticipate   Re-evaluate in 1 hour/PRN     DANN Cassidy CNM

## 2020-05-14 NOTE — L&D DELIVERY NOTE
OB Vaginal Delivery Note  Caroline presented for IOL secondary to AMA over 40 years as well as pre-eclampsia. She progressed well and quickly after 1 dose of cytotec. SROM with clear fluid which later turned to meconium stained fluid. She received an epidural. Shortly after epidural she started to have a category 2 tracing with late decelerations and variable decelerations. Cervix 9/100/-1. Tried different resuscitation measures. Started pushing at 0515 am. Had a category 2 huddle with RN and MD on call at that time as well. Discussed plan to continue to try pushing, unable to do vacuum at that time. Patient made really good progress over the next hour and delivered baby boy. NICU called to delivery due to decelerations. Meconium noted with the birth. Baby did not require assessment from NICU and able to stay with mother. Placenta delivered intact with 3 vessel cord. 2nd degree laceration noted and repaired in normal fashion. Mother and baby stable and bonding.     Caroline Pride MRN# [id number]   Age: 39 year old YOB: 1980       GA: 40w2d  GP:   Labor Complications: Preeclampsia/Hypertension   EBL:   mL  Delivery QBL: 249 mL  Delivery Type: Vaginal, Spontaneous   ROM to Delivery Time: (Delivered) Hours: 4 Minutes: 30  [place] Weight:     1 Minute 5 Minute 10 Minute   Apgar Totals: 8   9        GIRMA KNIGHT     Delivery Details:  Caroline Pride, a 39 year old  female delivered a viable infant with apgars of 8  and 9 . Patient was fully dilated and pushing after 3  hours 15  minutes in active labor. Delivery was via vaginal, spontaneous  to a sterile field under epidural  anesthesia. Infant delivered in vertex  left  occiput  anterior  position. Anterior and posterior shoulders delivered without difficulty. The cord was clamped, cut twice and 3 vessels  were noted. Cord blood was obtained in routine fashion with the following disposition: lab .      Cord complications: none    Placenta delivered at 2020  6:40 AM . Placental disposition was Hospital disposal . Fundal massage performed and fundus found to be firm.     Episiotomy: none    Perineum, vagina, cervix were inspected, and the following lacerations were noted:   Perineal lacerations: 2nd                Any lacerations were repaired in the usual fashion using 3-0 vicryl.    Excellent hemostasis was noted. Needle count correct. Infant and patient in delivery room in good and stable condition.        Labor Event Times    Labor onset date:  20 Onset time:   2:00 AM   Dilation complete date:  20 Complete time:   5:15 AM   Start pushing date/time:  2020 0515      Labor Length    1st Stage (hrs):  3 (min):  15   2nd Stage (hrs):  1 (min):  15   3rd Stage (hrs):  0 (min):  10      Labor Events     labor?:  No   steroids:  None  Labor Type:  Induction/Cervical ripening  Predominate monitoring during 1st stage:  continuous electronic fetal monitoring     Antibiotics received during labor?:  No     Rupture date/time: 20 0200   Rupture type:  Spontaneous rupture of membranes occuring during spontaneous labor or augmentation  Fluid color:  Pink  Fluid odor:  Normal     Induction:  Misoprostol  Induction date/time: 20 1930   Cervical ripening date/time:     Indications for induction:  Mild Preeclampsia     1:1 continuous labor support provided by?:  RN Labor partogram used?:  no      Delivery/Placenta Date and Time    Delivery Date:  20 Delivery Time:   6:30 AM   Placenta Date/Time:  2020  6:40 AM  Oxytocin given at the time of delivery:  after delivery of baby     Vaginal Counts     Initial count performed by 2 team members:   Two Team Members   WEN Castillo CNM       [place] [employer]   Initial counts 2  5    Added to count  1     Final counts 2 1 5    Placed during labor Accounted for at the end of labor   No No   Yes Yes   No No     Final count performed by 2 team members:   Two Team Members   WEN Castillo CNM         Apgars    Living status:  Living   1 Minute 5 Minute 10 Minute 15 Minute 20 Minute   Skin color: 0  1       Heart rate: 2  2       Reflex irritability: 2  2       Muscle tone: 2  2       Respiratory effort: 2  2       Total: 8  9       Apgars assigned by:  NISHI KNIGHT RN     Cord    Vessels:  3 Vessels Complications:  None   Cord Blood Disposition:  Lab Gases Sent?:  No       Resuscitation    Methods:  None  [place] Care at Delivery:  NNP Delivery Attendance:  NICU team called to attend the delivery due to decels. Spontaneous cry and respirations noted after birth. Delayed cord clamping given. NICU team not needed and dismissed.   DANN Kelly, [facility]     2020, 6:57 AM  Output in Delivery Room:  Stool     Skin to Skin and Feeding Plan    Skin to skin initiation date/time: 1841    Skin to skin with:  Mother  Skin to skin end date/time:     How do you plan to feed your baby:  Breastfeeding     Labor Events and Shoulder Dystocia    Fetal Tracing Prior to Delivery:  Category 2  Shoulder dystocia present?:  Neg     Delivery (Maternal) (Provider to Complete) ([id number])    Episiotomy:  None  Perineal lacerations:  2nd Repaired?:  Yes      Blood Loss  Mother: Kareem Pride Caroline #[id number]   Start of Mother's Information    IO Blood Loss  20 0200 - 20 0738    Delivery QBL (mL) Hospital Encounter 249 mL    Total  249 mL         End of Mother's Information  Mother: Kareem PrideCaroline #[id number]         Delivery - Provider to Complete ([id number])    Delivering clinician:  Smiley Nath APRN CNM  CNM Care:  Exclusive CNM care in labor  Attempted Delivery Types (Choose all that apply):  Spontaneous Vaginal Delivery  Delivery Type (Choose the 1 that will go to the Birth History):  Vaginal, Spontaneous   Other personnel:   Provider Role   Nishi Knight, WEN  Delivery Nurse         Placenta    Delayed Cord Clamping:  Done  Date/Time:  5/14/2020  6:40 AM  Removal:  Spontaneous  Comments:  intact with three vessel cord  Disposition:  Hospital disposal     Anesthesia    Method:  Epidural          Presentation and Position    Presentation:  Vertex  Position:  Left Occiput Anterior           SmileyDANN Tee CNM

## 2020-05-14 NOTE — PLAN OF CARE
Patient arrived to [facility] unit via wheelchair at 0925,with belongings, accompanied by SO, with infant in arms. Received report from Brandy CARO RN and checked bands. Unit and room orientation completd. Call light given; no concerns present at this time. Continue with plan of care.

## 2020-05-14 NOTE — PLAN OF CARE
Baby with minimal variability and late decelerations. Patient repositioned to right and left lateral with no changes in decelerations. Patient had just received 500ml bolus for epidural placement. Unable to place toco to trace contractions despite multiple adjustments. Contractions palpated per writer. Call placed to CNM for internal monitor placement. SVE 9 cm per CNM. CNM states does not need internal monitors at this time. Decelerations continue, unable to trace contractions. CNM again called to bedside to place IUPC. Will continue to palpate and reposition at this time.

## 2020-05-14 NOTE — PROVIDER NOTIFICATION
05/14/20 0517   Provider Notification   Provider Name/Title Dr. Cat   Method of Notification Electronic Page   Request Evaluate in Person   Notification Reason Decels;Variability Change;Uterine Activity   Dr. Cat requested to bedside to evaluate category 2 fetal heart tracing and uterine tachysystole.

## 2020-05-14 NOTE — PLAN OF CARE
Caroline Pride is a  at 40.1 weeks who [facility] for induction of labor for preeclampsia/AMA. Patient is afebrile. Blood pressures labile, other vss. Denies HA, blurred vision or epigastric pain. Denies bleeding, leaking, contractions or cramping. States fetus active. Monitors applied for fetal/uterine monitoring during IOL. CNM notified of patient arrival. SVE /-2. Plan for misoprostol vaginally overnight. Saline lock placement, no need for IV fluids at this time. Labs obtained and sent. Patient may have sleep meds if desired. Oriented to room and call light. Continue present cares.

## 2020-05-14 NOTE — ANESTHESIA PROCEDURE NOTES
Epidural Procedure Note  Staff -   Anesthesiologist:  Cameron Dowling DO      Performed By: anesthesiologist          Location: OB     Procedure start time:  5/14/2020 3:18 AM     Procedure end time:  5/14/2020 3:34 AM   Pre-procedure checklist:   patient identified, IV checked, site marked, risks and benefits discussed, informed consent, monitors and equipment checked, pre-op evaluation, at physician/surgeon's request and post-op pain management      Correct Patient: Yes      Correct Position: Yes      Correct Site: Yes      Correct Procedure: Yes      Correct Laterality:  Yes    Site Marked:  Yes  Procedure:     Procedure:  Epidural catheter    ASA:  2    Diagnosis:  Labor    Position:  Sitting    Sterile Prep: chloraprep      Insertion site:  L3-4    Local skin infiltration:  1% lidocaine    amount (mL):  2    Approach:  Midline    Needle gauge (G):  17    Needle Length (in):  3.5    Block Needle Type:  Freddyuhsuzanne    Injection Technique:  LORT saline    SABAS at (cm):  5.5    Attempts:  1    Redirects:  0    Catheter gauge (G):  19    Catheter threaded easily: Yes      Threaded to cm at skin:  9.5    Paresthesias:  No    Aspiration negative for Heme or CSF: Yes      Test dose (mL):  3     Local anesthetic:  Lidocaine 1.5% w/ 1:200,000 epinephrine    Test dose negative for signs of intravascular, subdural or intrathecal injection: Yes    Assessment/Narrative:      Informed consent obtained.  All risks and benefits of the epidural/spinal discussed with the patient.  All questions answered and all parties agreed with the plan.

## 2020-05-14 NOTE — PLAN OF CARE
Patient is stable and comfortable. Offered pain meds but declined. She's been voiding freely. Assisted with breastfeeding but baby is disinterested. Skin to skin with baby observed. Demonstrated hand expression and collected 2 big drops and given to baby. Encouraged to call for breastfeeding help. Will continue with plan of care.

## 2020-05-14 NOTE — PROVIDER NOTIFICATION
05/14/20 0523   Provider Notification   Provider Name/Title Dr. Cat   Method of Notification At Bedside   Reviewed tracing. Dr. Cat at bedside pushing with patient. States strip with moderate variability. Baby not yet low enough to vacuum. No need to use tocolytic at this time. Continue pushing.

## 2020-05-15 LAB — HGB BLD-MCNC: 10.9 G/DL (ref 11.7–15.7)

## 2020-05-15 PROCEDURE — 12000001 ZZH R&B MED SURG/OB UMMC

## 2020-05-15 PROCEDURE — 36415 COLL VENOUS BLD VENIPUNCTURE: CPT | Performed by: ADVANCED PRACTICE MIDWIFE

## 2020-05-15 PROCEDURE — 25000132 ZZH RX MED GY IP 250 OP 250 PS 637: Performed by: ADVANCED PRACTICE MIDWIFE

## 2020-05-15 PROCEDURE — 85018 HEMOGLOBIN: CPT | Performed by: ADVANCED PRACTICE MIDWIFE

## 2020-05-15 RX ORDER — DOCUSATE SODIUM 100 MG/1
100 CAPSULE, LIQUID FILLED ORAL 2 TIMES DAILY PRN
Qty: 60 CAPSULE | Refills: 0 | Status: SHIPPED | OUTPATIENT
Start: 2020-05-15 | End: 2022-07-27

## 2020-05-15 RX ADMIN — DOCUSATE SODIUM AND SENNOSIDES 2 TABLET: 8.6; 5 TABLET, FILM COATED ORAL at 08:20

## 2020-05-15 RX ADMIN — ACETAMINOPHEN 650 MG: 325 TABLET, FILM COATED ORAL at 20:46

## 2020-05-15 RX ADMIN — DOCUSATE SODIUM AND SENNOSIDES 1 TABLET: 8.6; 5 TABLET, FILM COATED ORAL at 20:46

## 2020-05-15 RX ADMIN — IBUPROFEN 800 MG: 800 TABLET, FILM COATED ORAL at 09:17

## 2020-05-15 RX ADMIN — ACETAMINOPHEN 650 MG: 325 TABLET, FILM COATED ORAL at 08:15

## 2020-05-15 RX ADMIN — ACETAMINOPHEN 650 MG: 325 TABLET, FILM COATED ORAL at 12:32

## 2020-05-15 RX ADMIN — ACETAMINOPHEN 650 MG: 325 TABLET, FILM COATED ORAL at 17:03

## 2020-05-15 RX ADMIN — IBUPROFEN 800 MG: 800 TABLET, FILM COATED ORAL at 16:00

## 2020-05-15 RX ADMIN — IBUPROFEN 800 MG: 800 TABLET, FILM COATED ORAL at 23:33

## 2020-05-15 RX ADMIN — IBUPROFEN 800 MG: 800 TABLET, FILM COATED ORAL at 03:03

## 2020-05-15 NOTE — PROGRESS NOTES
Message from RN, infant staying due to low bilirubin.    Due to  Difficulty with breast feeding and partner assistance with patient and infant.  Patient will be discharged in the morning.  Yodit Westfall CNM

## 2020-05-15 NOTE — PROGRESS NOTES
Post Partum Note    Caroline Pride      MRN#: [id number]  Age: 39 year old      YOB: 1980      Post-partum day #1    SIGNIFICANT PROBLEMS:  Patient Active Problem List    Diagnosis Date Noted     Labor and delivery, indication for care 2020     Priority: Medium     AMA (advanced maternal age) primigravida 35+, first trimester 10/22/2019     Priority: Medium     10/30/19: 12w1d normal nuchal translucency, nasal bone visualized       Need for Tdap vaccination 10/08/2019     Priority: Medium     Subclinical hypothyroidism 2018     Priority: Medium     Vitamin D deficiency 2018     Priority: Medium     GLORIA (generalized anxiety disorder) 2017     Priority: Medium     Other procreative management counseling and advice 2015     Priority: Medium       INTERVAL HISTORY:  /75   Pulse 97   Temp 97.7  F (36.5  C) (Oral)   Resp 18   LMP 2019   SpO2 97%   Breastfeeding Unknown     Pt stable, baby is rooming in  Breast feeding status:initiated  Complications since 2 hours post delivery: None  Patient is tolerating acitivity well Voiding without difficulty, cramping is relieved by Ibuprophen, lochia is decreasing and patient denies clots.  Perineal pain is is relieved by Ibuprophen.  The perineum laceration is well approximated    Normal postpartum exam   /75   Pulse 97   Temp 97.7  F (36.5  C) (Oral)   Resp 18   LMP 2019   SpO2 97%   Breastfeeding Unknown   Reflexes +2 no clonus no edema    Postpartum hemoglobin   Hemoglobin   Date Value Ref Range Status   05/15/2020 10.9 (L) 11.7 - 15.7 g/dL Final     Blood type   Lab Results   Component Value Date    ABO A 2020       Lab Results   Component Value Date    RH Pos 2020     Rubella status No results found for: RUBELLAABIGG    ASSESSMENT/PLAN:  Stable Post-partum day #1  Complications:none  Plan d/c home today  RTC 6 weeks  Teaching done: D/C Instructions: Nutrition/Activity,  Engorgement Management, Birth Control Options, Warning Signs/When to Call: Excessive Bleeding, Infection, PP Depression, Kegals and Crunches, RTC Clinic for PP Appointment and PNV    Postpartum warning s/s reviewed, including bleeding/clots, fever, mastitis, or depression    Birthcontrol planned:None, not needed same sex marriage, IVF for pregnancy   Current Discharge Medication List      CONTINUE these medications which have NOT CHANGED    Details   Calcium Carbonate-Vitamin D (CALCIUM-VITAMIN D3 PO)       Cyanocobalamin (VITAMIN B12 PO)       Evening Primrose Oil 1000 MG CAPS Take 1,200 mg by mouth daily      IRON PO       Omega-3 Fatty Acids (FISH OIL PO)       Prenatal Vit-Fe Fumarate-FA (PRENATAL MULTIVITAMIN  PLUS IRON) 27-0.8 MG TABS Take 1 tablet by mouth daily      Pyridoxine HCl (VITAMIN B6 PO)              All teaching done, pt not looking at me, partner answering.   Enc. Abdominal crunches and kegals, all warning signs reviewed.   rtc in 6 weeks   Discharge home this evening.  Yodit Westfall CNM

## 2020-05-15 NOTE — PLAN OF CARE
Data: Vital signs and postpartum checks WDL  Patient eating and drinking normally. Patient able to empty bladder independently and is up ambulating.  Patient performing self cares and is able to care for infant. Breastfeeding on demand with assist. Soak in the tub once.   Action: Patient medicated during the shift for pain with Tylenol and Ibuprofen with relief after 1 hour. Patient education done see education record.  Response: Positive attachment behaviors observed with infant. Support persons SO present.    Plan: Continue with the plan of care

## 2020-05-15 NOTE — PLAN OF CARE
VSS and postpartum assessments WDL.  Up ad rukhsana in room with steady gait.  Independent with cares.  Bonding well with infant.  Breastfeeding on cue with assist.  Hand expressing independently and reports getting 5mls+ to spoon/cup feed infant.  EVANGELINA Parker worked with breastfeeding a lot today, see her note.  Pain managed with tylenol and ibuprofen per MAR.  Wife, Mila present and supportive.  Working on birth certificate, EDS and watching [facility] educational videos between cares and feedings.  Will continue with postpartum cares and education per plan of care.

## 2020-05-15 NOTE — LACTATION NOTE
This note was copied from a baby's chart.  Consult for: First baby, struggling with latch but fed well right after delivery.    History:  Vaginal delivery @ 40w2d, AGA infant @ 8# birthweight, just over 24 hours at time of visit.  Maternal history of GLORIA, subclinical hypothyroid (TSH and T4 free WNL during pregnancy), vitamin D deficiency, preeclampsia, elevated GCT but GTT WNL, PCOS, AMA @ 38 y/o.     Breast exam of mom: Soft, rounded, symmetricwith intact, everted nipples bilaterally.  Caroline noted early tenderness, areolar pigment changes & bilateral breast growth during pregnancy.      Oral exam of baby:  Mildly recessed jaw, higher anterior arch to palate, anterior attachment of thin, lingual frenulum. Disorganized suck on finger, very easy to break seal when sucking on finger and at breast with any movement.     Feeding assessment:  Infant alert and cueing, latched readily with full assist. Mom denies pain, no dimpling at cheek and had good, deep jaw pulls with intermittent swallowing. On second side, had mom return demo on latching with minimal assist, then no assist but more difficult on left breast to get good seal, deep dimpling in his left cheek which would improve some when on deeper but persisted throughout feeding even when full assist for deep latch.     *Returned for feeding after frenotomy, infant brings his tongue farther forward well beyond him lower gum ridge with encouragement. Caroline able to latch him independently and keep him on for longer, says feels like stronger suck, but still struggles to maintain seal at breast longer than a few minutes. Dimpling noted in left cheek but none in right. Initiated 20 mm size nipple shield and demo for mom how to apply and latch him well onto it.  She return demo, achieved deep latch with nutritive suck and audible swallows, no dimpling at either cheek. Mom expressed relief able to keep him on easily with sustained feeding, denies pain. Milk visible in shield  when he came off second side.     Education provided: Discussed positioning & using pillows/blankets for support, anatomy of breast and infant mouth for feedings, ways to get and maintain deeper latch, breast compressions to help with milk transfer, point out nutritive vs. non-nutritive suck and how to hear swallows, benefits of skin to skin and feeding on cue, supply and demand, benefits of breast massage & hand expression, hands on pumping in the first week or so to help maximize supply. Reviewed how to tell when satiated and if getting enough, what to expect in the coming days and preventing engorgement, feeding log with when and who to call if concerns, [facility] [place][note 2] pump cleaning handout and breastfeeding resource list adding in [email or web address] and additional resources.     Plan: Encouraged frequent skin to skin, breastfeed on cue with goal of 8 to 12 feedings in 24 hours & continued hand expressing after feedings until milk is in. Recommend hands on pumping 4-6 times daily to help maximize milk supply, feed back what she gets. Follow up with outpatient lactation consultant within a week of discharge for support with maximizing supply (maternal risk factors above), weaning from nipple shield and pumping for first time breastfeeding dyad.

## 2020-05-16 VITALS
SYSTOLIC BLOOD PRESSURE: 130 MMHG | TEMPERATURE: 98 F | HEART RATE: 93 BPM | RESPIRATION RATE: 16 BRPM | DIASTOLIC BLOOD PRESSURE: 83 MMHG | OXYGEN SATURATION: 97 %

## 2020-05-16 PROCEDURE — 25000132 ZZH RX MED GY IP 250 OP 250 PS 637: Performed by: ADVANCED PRACTICE MIDWIFE

## 2020-05-16 RX ADMIN — IBUPROFEN 800 MG: 800 TABLET, FILM COATED ORAL at 08:52

## 2020-05-16 RX ADMIN — ACETAMINOPHEN 650 MG: 325 TABLET, FILM COATED ORAL at 03:24

## 2020-05-16 NOTE — PLAN OF CARE
Normal postpartum assessment, VSS. Independently ambulating, and managing hygiene, minimal bleeding. Breastfeeding  with nipple shields, confident in care plan for baby and bonding well. Supportive partner. Continue with POC.

## 2020-05-16 NOTE — PLAN OF CARE
Caroline Pride is PPD 2  Fundus firm, lochia scant-small without clots.  Perineum: intact.  Ambulating, voiding, tolerating diet, breastfeeding well, frequent and independently. With nipple shield  Vitals stable.   Pain controlled with Ibuprofen, last given at 0830.   Bonding well with baby. SO involved in cares.   discharge meds: ordered, will give when they arrive from pharmacy  discharge instructions: done    Discharged at 1100

## 2020-05-16 NOTE — PLAN OF CARE
VSS and postpartum assessments WDL. Fundus firm at U/1. Scant lochia.  Up ad rukhsana with steady gait.  Independent with cares.  Bonding well with infant.  Breastfeeding on cue with some assistance. Hand expressing and getting some drops. Pain managed with tylenol and ibuprofen. Partner present and supportive.  Will continue with postpartum cares and education per plan of care.

## 2020-05-16 NOTE — PROGRESS NOTES
Post Partum Note    Caroline Pride      MRN#: [id number]  Age: 39 year old      YOB: 1980      Post-partum day #2    SIGNIFICANT PROBLEMS:  Patient Active Problem List    Diagnosis Date Noted     Labor and delivery, indication for care 2020     Priority: Medium     AMA (advanced maternal age) primigravida 35+, first trimester 10/22/2019     Priority: Medium     10/30/19: 12w1d normal nuchal translucency, nasal bone visualized       Need for Tdap vaccination 10/08/2019     Priority: Medium     Subclinical hypothyroidism 2018     Priority: Medium     Vitamin D deficiency 2018     Priority: Medium     GLORIA (generalized anxiety disorder) 2017     Priority: Medium     Other procreative management counseling and advice 2015     Priority: Medium       INTERVAL HISTORY:  /83   Pulse 93   Temp 98  F (36.7  C) (Oral)   Resp 16   LMP 2019   SpO2 97%   Breastfeeding Unknown     Pt stable, baby is rooming in  Breast feeding status:initiated and well established, has been working with lactation consultant  Complications since 2 hours post delivery: None  Patient is tolerating acitivity well Voiding without difficulty, cramping is relieved by Ibuprophen, lochia is decreasing and patient denies clots.  Perineal pain is is relieved by Ibuprophen.  The perineum laceration is well approximated    Normal postpartum exam   /83   Pulse 93   Temp 98  F (36.7  C) (Oral)   Resp 16   LMP 2019   SpO2 97%   Breastfeeding Unknown       Postpartum hemoglobin   Hemoglobin   Date Value Ref Range Status   05/15/2020 10.9 (L) 11.7 - 15.7 g/dL Final     Blood type   Lab Results   Component Value Date    ABO A 2020       Lab Results   Component Value Date    RH Pos 2020     Rubella status No results found for: RUBELLAABIGG    ASSESSMENT/PLAN:  Stable Post-partum day #2  Complications:none  Plan d/c home today  RTC 6 weeks  Teaching done: D/C  Instructions: Nutrition/Activity, Engorgement Management, Birth Control Options, Warning Signs/When to Call: Excessive Bleeding, Infection, PP Depression, Kegals and Crunches, RTC Clinic for PP Appointment and PNV    Postpartum warning s/s reviewed, including bleeding/clots, fever, mastitis, or depression    Birthcontrol planned:None, in same sex marriage  Current Discharge Medication List      START taking these medications    Details   docusate sodium (COLACE) 100 MG capsule Take 1 capsule (100 mg) by mouth 2 times daily as needed for constipation  Qty: 60 capsule, Refills: 0    Associated Diagnoses: Labor and delivery, indication for care         CONTINUE these medications which have NOT CHANGED    Details   Calcium Carbonate-Vitamin D (CALCIUM-VITAMIN D3 PO)       Cyanocobalamin (VITAMIN B12 PO)       Evening Primrose Oil 1000 MG CAPS Take 1,200 mg by mouth daily      IRON PO       Omega-3 Fatty Acids (FISH OIL PO)       Prenatal Vit-Fe Fumarate-FA (PRENATAL MULTIVITAMIN  PLUS IRON) 27-0.8 MG TABS Take 1 tablet by mouth daily      Pyridoxine HCl (VITAMIN B6 PO)              Patient feels much better about breastfeeding today and would like to go home. Infant not had bowel movement yet will discharge patient today and hopefully infant will be discharged to   Reviewed teaching briefly from yesterday and asked if any questions.    Discharge home today.  Yodit Westfall CNM

## 2020-05-17 ENCOUNTER — NURSE TRIAGE (OUTPATIENT)
Dept: NURSING | Facility: CLINIC | Age: 40
End: 2020-05-17

## 2020-05-20 ENCOUNTER — TELEPHONE (OUTPATIENT)
Dept: FAMILY MEDICINE | Facility: CLINIC | Age: 40
End: 2020-05-20

## 2020-07-31 ENCOUNTER — MEDICAL CORRESPONDENCE (OUTPATIENT)
Dept: HEALTH INFORMATION MANAGEMENT | Facility: CLINIC | Age: 40
End: 2020-07-31

## 2020-09-15 ENCOUNTER — MEDICAL CORRESPONDENCE (OUTPATIENT)
Dept: HEALTH INFORMATION MANAGEMENT | Facility: CLINIC | Age: 40
End: 2020-09-15

## 2020-11-04 ENCOUNTER — VIRTUAL VISIT (OUTPATIENT)
Dept: FAMILY MEDICINE | Facility: OTHER | Age: 40
End: 2020-11-04
Payer: COMMERCIAL

## 2020-11-04 PROCEDURE — 99421 OL DIG E/M SVC 5-10 MIN: CPT | Performed by: NURSE PRACTITIONER

## 2020-11-04 NOTE — PROGRESS NOTES
"Date: 2020 14:03:35  Clinician: La Nena Chin  Clinician NPI: [id number]  Patient: Caroline Pride  Patient : 1980  Patient Address: [address] [place] [address]  Patient Phone: [phone]  Visit Protocol: URI  Patient Summary:  Caroline is a 40 year old ( : 1980 ) female who initiated a [facility] Visit for COVID-19 (Coronavirus) evaluation and screening. When asked the question \"Please sign me up to receive news, health information and promotions from [facility].\", Caroline responded \"No\".    Caroline states her symptoms started gradually 5-6 days ago.   Her symptoms consist of myalgia, malaise, a headache, and nasal congestion.   Symptom details     Nasal secretions: The color of her mucus is yellow.    Headache: She states the headache is mild (1-3 on a 10 point pain scale).      Caroline denies having vomiting, rhinitis, facial pain or pressure, chills, sore throat, teeth pain, ageusia, diarrhea, ear pain, wheezing, fever, cough, nausea, and anosmia. She also denies having recent facial or sinus surgery in the past 60 days, double sickening (worsening symptoms after initial improvement), and having a sinus infection within the past year. She is not experiencing dyspnea.   Precipitating events  She has not recently been exposed to someone with influenza. Caroline has been in close contact with the following high risk individuals: children under the age of 5.   Pertinent COVID-19 (Coronavirus) information  Caroline does not work or volunteer as healthcare worker or a . In the past 14 days, Caroline has not worked or volunteered at a healthcare facility or group living setting.   In the past 14 days, she also has not lived in a congregate living setting.   Caroline has not had a close contact with a laboratory-confirmed COVID-19 patient within 14 days of symptom onset.    Since 2019, Caroline has been tested for COVID-19 and has not had upper respiratory infection or influenza-like illness.   "    Result of COVID-19 test: Negative     Date of her COVID-19 test: 05/13/2020      Pertinent medical history  Caroline has taken an antibiotic medication in the past month. Antibiotic details as reported by the patient (free text): [id number] tablet of valacyclovir for a cold sore that had started, my body is still trying to fight it off (sore lymph nodes) but I'm not developing the blister.   Caroline does not get yeast infections when she takes antibiotics.   Caroline does not need a return to work/school note.   Weight: 198 lbs   Caroline does not smoke or use smokeless tobacco.   She denies pregnancy and is breastfeeding. She is currently menstruating.   Weight: 198 lbs    MEDICATIONS: valacyclovir oral, ALLERGIES: NKDA  Clinician Response:  Dear Caroline,         Your symptoms show that you may have coronavirus (COVID-19). This&nbsp;illness can cause fever, cough and trouble breathing. Many people get a mild case and get better on their own. Some people can get very sick.  What should I do?  We would like to test you for this virus.  1. Please call [phone] to schedule your visit. Explain that you were referred by [facility] to have a COVID-19 test. Be ready to share your [facility] visit ID number. Do not schedule your appointment until you have had at least 2 days of symptoms or you may receive a false negative result.  The following will serve as your written order for this COVID Test, ordered by me, for the indication of suspected COVID [Z20.828]: The test will be ordered in [facility], our electronic health record, after you are scheduled. It will show as ordered and authorized by Kd Sommer MD.  Order: COVID-19 (Coronavirus) PCR for SYMPTOMATIC testing from [facility].    2. When it's time for your COVID test:  Stay at least 6 feet away from others. (If someone will drive you to your test, stay in the backseat, as far away from the  as you can.)  Cover your mouth and nose with a mask, tissue or washcloth.  Go straight to the testing  "site. Don't make any stops on the way there or back.    3.Starting now:&nbsp;Stay home and away from others (self-isolate) until:   You've had&nbsp;no&nbsp;fever---and no medicine that reduces fever---for one full day (24 hours).&nbsp;And...  Your other symptoms have gotten better. For example, your cough or breathing has improved.&nbsp;And...  At least&nbsp;10 days&nbsp;have passed since your symptoms started.    During this time, don't leave the house except for testing or medical care.   Stay in your own room, even for meals. Use your own bathroom if you can.  Stay away from others in your home. No hugging, kissing or shaking hands. No visitors.  Don't go to work, school or anywhere else.   Clean \"high touch\" surfaces often (doorknobs, counters, handles, etc.). Use a household cleaning spray or wipes. You'll find a full list of  on the EPA website:&nbsp;www.epa.gov/pesticide-registration/list-n-disinfectants-use-against-sars-cov-2.   Cover your mouth and nose with a mask, tissue or washcloth to avoid spreading germs.  Wash your hands and face often. Use soap and water.  Caregivers in these groups are at risk for severe illness due to COVID-19:  o People 65 years and older  o People who live in a nursing home or long-term care facility  o People with chronic disease (lung, heart, cancer, diabetes, kidney, liver, immunologic)  o People who have a weakened immune system, including those who:   Are in cancer treatment  Take medicine that weakens the immune system, such as corticosteroids  Had a bone marrow or organ transplant  Have an immune deficiency  Have poorly controlled HIV or AIDS  Are obese (body mass index of 40 or higher)  Smoke regularly   o Caregivers should wear gloves while washing dishes, handling laundry and cleaning bedrooms and bathrooms.  o Use caution when washing and drying laundry: Don't shake dirty laundry, and use the warmest water setting that you can.  o For more tips, go " to&nbsp;www.cdc.gov/coronavirus/2019-ncov/downloads/10Things.pdf.   How can I take care of myself?    Get lots of rest. Drink extra fluids&nbsp;(unless a doctor has told you not to).  Take Tylenol (acetaminophen) for fever or pain.&nbsp;If you have liver or kidney problems, ask your family doctor if it's okay to take Tylenol.   Adults can take either:   650 mg (two 325 mg pills) every 4 to 6 hours,&nbsp;or...  1,000 mg (two 500 mg pills) every 8 hours as needed.  Note:&nbsp;Don't take more than 3,000 mg in one day. Acetaminophen is found in many medicines (both prescribed and over-the-counter medicines). Read all labels to be sure you don't take too much.   For children, check the Tylenol bottle for the right dose. The dose is based on the child's age or weight.   If you have other health problems (like cancer, heart failure, an organ transplant or severe kidney disease):&nbsp;Call your specialty clinic if you don't feel better in the next 2 days.    Know when to call 911.&nbsp;Emergency warning signs include:   Trouble breathing or shortness of breath Pain or pressure in the chest that doesn't go away Feeling confused like you haven't felt before, or not being able to wake up Bluish-colored lips or face.  Where can I get more information?   [facility] -- About COVID-19:&nbsp;[email or web address]  CDC -- What to Do If You're Sick:&nbsp;www.cdc.gov/coronavirus/2019-ncov/about/steps-when-sick.html  CDC -- Ending Home Isolation:&nbsp;www.cdc.gov/coronavirus/2019-ncov/hcp/disposition-in-home-patients.html  CDC -- Caring for Someone:&nbsp;www.cdc.gov/coronavirus/2019-ncov/if-you-are-sick/care-for-someone.html  [facility] -- Interim Guidance for Hospital Discharge to Home:&nbsp;www.[facility].[place]/diseases/coronavirus/hcp/hospdischarge.pdf  [facility] clinical trials (COVID-19 research studies):&nbsp;clinicalaffairs.[facility]-clinical-trials  Below are the COVID-19 hotlines at the [place]  Department of Health ([facility]). Interpreters are available.   For health questions: Call 068-785-7024 or 1-696.468.9099 (7 a.m. to 7 p.m.) For questions about schools and childcare: Call 932-594-8379 or 1-125.961.1149 (7 a.m. to 7 p.m.)           Diagnosis: Contact with and (suspected) exposure to other viral communicable diseases  Diagnosis ICD: Z20.828

## 2020-11-07 DIAGNOSIS — Z20.822 SUSPECTED COVID-19 VIRUS INFECTION: Primary | ICD-10-CM

## 2020-11-17 ENCOUNTER — MEDICAL CORRESPONDENCE (OUTPATIENT)
Dept: HEALTH INFORMATION MANAGEMENT | Facility: CLINIC | Age: 40
End: 2020-11-17

## 2020-11-17 ENCOUNTER — IMMUNIZATION (OUTPATIENT)
Dept: NURSING | Facility: CLINIC | Age: 40
End: 2020-11-17
Payer: COMMERCIAL

## 2020-11-17 PROCEDURE — 90686 IIV4 VACC NO PRSV 0.5 ML IM: CPT

## 2020-11-17 PROCEDURE — 90471 IMMUNIZATION ADMIN: CPT

## 2021-02-05 ENCOUNTER — VIRTUAL VISIT (OUTPATIENT)
Dept: FAMILY MEDICINE | Facility: CLINIC | Age: 41
End: 2021-02-05
Payer: COMMERCIAL

## 2021-02-05 DIAGNOSIS — B00.1 COLD SORE: Primary | ICD-10-CM

## 2021-02-05 PROCEDURE — 99203 OFFICE O/P NEW LOW 30 MIN: CPT | Mod: 95 | Performed by: PHYSICIAN ASSISTANT

## 2021-02-05 RX ORDER — VALACYCLOVIR HYDROCHLORIDE 1 G/1
2000 TABLET, FILM COATED ORAL 2 TIMES DAILY
Qty: 12 TABLET | Refills: 6 | Status: SHIPPED | OUTPATIENT
Start: 2021-02-05 | End: 2021-12-20

## 2021-02-05 NOTE — PROGRESS NOTES
"Caroline is a 40 year old who is being evaluated via a billable video visit.      How would you like to obtain your AVS? MyChart  If the video visit is dropped, the invitation should be resent by: Text to cell phone: [phone]  Will anyone else be joining your video visit? No      Video Start Time: 11:25  Assessment & Plan       ICD-10-CM    1. Cold sore  B00.1 valACYclovir (VALTREX) 1000 mg tablet                            BMI:   Estimated body mass index is 32.95 kg/m  as calculated from the following:    Height as of 4/8/20: 1.721 m (5' 7.75\").    Weight as of 5/13/20: 97.6 kg (215 lb 1.6 oz).             No follow-ups on file.    Unique Peterson PA-C  [facility]    Brandy Velazquez is a 40 year old who presents to clinic today for the following health issues     HPI       Concern - cold sore   Onset: couple weeks ago   Description: located on the lower lip, old pills has 2 of them, and needing some more to finish the cycle. would like a refill    Intensity: mild, moderate, severe  Progression of Symptoms:  worsening  Accompanying Signs & Symptoms: scaling, a little itchy, no bleeding   Previous history of similar problem: Yes, in the past   Precipitating factors:        Worsened by: dryness and stress of children   Alleviating factors:        Improved by: valacyclovir, cream    Therapies tried and outcome: old prescription # [id number]    Cold sore started this morning  Gets cold sores a few times a year      Review of Systems   CONSTITUTIONAL: NEGATIVE for fever, chills, change in weight  ENT/MOUTH: NEGATIVE for ear, mouth and throat problems  RESP: NEGATIVE for significant cough or SOB  CV: NEGATIVE for chest pain, palpitations or peripheral edema      Objective           Vitals:  No vitals were obtained today due to virtual visit.    Physical Exam   GENERAL: Healthy, alert and no distress  EYES: Eyes grossly normal to inspection.  No discharge or erythema, or obvious " scleral/conjunctival abnormalities.  RESP: No audible wheeze, cough, or visible cyanosis.  No visible retractions or increased work of breathing.    SKIN: 5-7 mm cold sore of bottom left lip  NEURO: Cranial nerves grossly intact.  Mentation and speech appropriate for age.  PSYCH: Mentation appears normal, affect normal/bright, judgement and insight intact, normal speech and appearance well-groomed.                Video-Visit Details    Type of service:  Video Visit    Video End Time:11:34 AM    Originating Location (pt. Location): Home    Distant Location (provider location):  [facility]     Platform used for Video Visit: Libia

## 2021-04-24 ENCOUNTER — HEALTH MAINTENANCE LETTER (OUTPATIENT)
Age: 41
End: 2021-04-24

## 2021-05-25 ENCOUNTER — OFFICE VISIT (OUTPATIENT)
Dept: FAMILY MEDICINE | Facility: CLINIC | Age: 41
End: 2021-05-25
Payer: COMMERCIAL

## 2021-05-25 ENCOUNTER — HOSPITAL ENCOUNTER (OUTPATIENT)
Dept: GENERAL RADIOLOGY | Facility: CLINIC | Age: 41
Discharge: HOME | End: 2021-05-25
Attending: FAMILY MEDICINE | Admitting: FAMILY MEDICINE
Payer: COMMERCIAL

## 2021-05-25 ENCOUNTER — TELEPHONE (OUTPATIENT)
Dept: FAMILY MEDICINE | Facility: CLINIC | Age: 41
End: 2021-05-25

## 2021-05-25 VITALS
TEMPERATURE: 97.7 F | WEIGHT: 202 LBS | DIASTOLIC BLOOD PRESSURE: 86 MMHG | OXYGEN SATURATION: 98 % | BODY MASS INDEX: 29.92 KG/M2 | HEIGHT: 69 IN | HEART RATE: 85 BPM | SYSTOLIC BLOOD PRESSURE: 138 MMHG | RESPIRATION RATE: 16 BRPM

## 2021-05-25 DIAGNOSIS — M79.674 PAIN OF TOE OF RIGHT FOOT: Primary | ICD-10-CM

## 2021-05-25 DIAGNOSIS — S92.502A CLOSED FRACTURE OF PHALANX OF LEFT FOURTH TOE, INITIAL ENCOUNTER: ICD-10-CM

## 2021-05-25 PROCEDURE — 73660 X-RAY EXAM OF TOE(S): CPT | Mod: RT

## 2021-05-25 PROCEDURE — 73660 X-RAY EXAM OF TOE(S): CPT | Mod: 26 | Performed by: RADIOLOGY

## 2021-05-25 PROCEDURE — 99214 OFFICE O/P EST MOD 30 MIN: CPT | Performed by: FAMILY MEDICINE

## 2021-05-25 ASSESSMENT — MIFFLIN-ST. JEOR: SCORE: 1645.65

## 2021-05-25 NOTE — PROGRESS NOTES
Assessment & Plan     1. Pain of toe of right foot  - XR Toe Right G/E 2 Views    2. Closed fracture of phalanx of left fourth toe, initial encounter  - due to continued pain ordered boot and referred to ortho for further management  - ibuprofen 400 to 600 mg every 8 hours as needed for pain  - ice to to three times daily   - Orthopedic & Spine  Referral; Future  - Ankle/Foot Bracing Supplies Order for DME - ONLY FOR DME        Kathleen Strickland MD  [facility]    Brandy Velazquez is a 40 year old who presents for the following health issues     HPI     Concern - Broken Right Toe  Onset: 1 1/2 week ago  Description: hit the leg of a chair, it feels like dead weight  Intensity: moderate  Progression of Symptoms:  same  Accompanying Signs & Symptoms: swollen and bruising  Previous history of similar problem: no  Precipitating factors:        Worsened by: getting up from her knees, depends  Therapies tried and outcome: takes ibuprofen to help with the swollen and tapes it to her other toe so she does not bump it.    Stubbed right 4th toe around 10 days ago. She has been taking ibuprofen and iced for the first day. She has been able to walk on the toe. It feels like a dead weight toe. She is still having pain in the toe.       Review of Systems         Objective    There were no vitals taken for this visit.  There is no height or weight on file to calculate BMI.  Physical Exam   MSK: + right 4th toe with tenderness and mild edema

## 2021-05-25 NOTE — TELEPHONE ENCOUNTER
"Pt given MD comments    \"[facility]  will call you to coordinate your care as prescribed by your provider. A representative will call you within 1 business day to help schedule your appointment, or you may contact the  Representative at: [phone]\"    Pt wears size 9 shoe and this is her right foot. She is not going to  a boot but will let us know. First she is going to make appt with podiatrist    Iram Thacker RN, BSN  [facility]           "

## 2021-05-25 NOTE — TELEPHONE ENCOUNTER
RN, can you please inform pt that xray showed mild displaced fracture. Due to continued pain, I have ordered walking boot. Pt can pick it up at clinic, no driving with boot. Ibuprofen 400 to 600 mg TID PRN and ice two to three times daily. Follow up with podiatry, referral placed.         IMPRESSION: Mildly displaced fracture of the fourth proximal phalanx.

## 2021-05-28 ENCOUNTER — OFFICE VISIT (OUTPATIENT)
Dept: PODIATRY | Facility: CLINIC | Age: 41
End: 2021-05-28
Payer: COMMERCIAL

## 2021-05-28 VITALS — HEIGHT: 69 IN | DIASTOLIC BLOOD PRESSURE: 86 MMHG | SYSTOLIC BLOOD PRESSURE: 134 MMHG | BODY MASS INDEX: 29.83 KG/M2

## 2021-05-28 DIAGNOSIS — S92.502A CLOSED FRACTURE OF PHALANX OF LEFT FOURTH TOE, INITIAL ENCOUNTER: ICD-10-CM

## 2021-05-28 PROCEDURE — 99203 OFFICE O/P NEW LOW 30 MIN: CPT | Performed by: PODIATRIST

## 2021-05-28 NOTE — PROGRESS NOTES
ASSESSMENT:  Encounter Diagnosis   Name Primary?     Closed fracture of phalanx of left fourth toe, initial encounter        MEDICAL DECISION MAKING:  I reviewed the previous x-rays with Caroline.  There is some displacement, shortening.  Otherwise, anatomy is acceptable.  The fracture is nonarticular.  I do not think there is an indication for any close reduction or percutaneous pinning at this point.    She has been fairly active and is wearing a flexible running shoe.  Recommend that for the next month, she focus on a stiffer soled shoe for protection.  Buddy taping is optional and sometimes can lead to creating a deformity, if too aggressive.  Discussed using Coban for edema reduction.  Ice, NSAIDs and Tylenol as needed.    Follow-up in 1 month if ongoing pain or other concerns..    Disclaimer: This note consists of symbols derived from keyboarding, dictation and/or voice recognition software. As a result, there may be errors in the script that have gone undetected. Please consider this when interpreting information found in this chart.    Kareem Mackay DPM, FACFAS, MS    [facility]      ____________________________________________________________________    HPI:       I was asked by Kathleen Strickladn MD to evaluate Caroline Pride in consultation for left 4th toe fracture.     Chief Complaint: broken toe, mildly displaced, 4th toe right foot  Onset of problem: 2 weeks; she stubbed her toe  Pain/ discomfort is described as:  shooting   Frequency:  daily    The pain is exacerbated by pushing off the foot  Previous treatment: she saw Dr. Strickland on 5/25/21. XR, surgical shoe, ibuprofen, buddy tapping  *  Past Medical History:   Diagnosis Date     Depressive disorder ongoing     PCOS (polycystic ovarian syndrome)      Subclinical hypothyroidism    *  *No past surgical history on file.*  *  Current Outpatient Medications   Medication Sig Dispense Refill     Calcium  "Carbonate-Vitamin D (CALCIUM-VITAMIN D3 PO)        Cyanocobalamin (VITAMIN B12 PO)        Evening Primrose Oil 1000 MG CAPS Take 1,200 mg by mouth daily       IRON PO        Omega-3 Fatty Acids (FISH OIL PO)        Prenatal Vit-Fe Fumarate-FA (PRENATAL MULTIVITAMIN  PLUS IRON) 27-0.8 MG TABS Take 1 tablet by mouth daily       Pyridoxine HCl (VITAMIN B6 PO)        docusate sodium (COLACE) 100 MG capsule Take 1 capsule (100 mg) by mouth 2 times daily as needed for constipation (Patient not taking: Reported on 5/25/2021) 60 capsule 0     valACYclovir (VALTREX) 1000 mg tablet Take 2 tablets (2,000 mg) by mouth 2 times daily for 1 day 12 tablet 6     EXAM:    Vitals: /86   Ht 1.753 m (5' 9\")   LMP 05/21/2021   BMI 29.83 kg/m    BMI: Body mass index is 29.83 kg/m .    Constitutional:  Caroline Pride is in no apparent distress, appears well-nourished.  Cooperative with history and physical exam.    Vascular:  Pedal pulses are palpable for both the DP and PT arteries.  CFT < 3 sec.  Mild edema, right 4th toe.    Neuro: Light touch sensation is intact to the L4, L5, S1 distributions  No evidence of weakness, spasticity, or contracture in the lower extremities.    Light touch sensation is intact at the distal right fourth toe.    Derm: Normal texture and turgor.  No erythema, ecchymosis, or cyanosis.  No open lesions.  Light touch sensation is intact at the distal right fourth toe.    Musculoskeletal:    Lower extremity muscle strength is normal. No gross deformities.  No deformity, on clinical exam, of the right fourth toe.  Flexor and extensor tendons intact.    X-Ray Findings:      EXAM: XR TOE RIGHT G/E 2 VIEWS  5/25/2021 12:05 PM       HISTORY: Pain of toe of right foot     COMPARISON: None     FINDINGS: 3 views of the right fourth toe.     Mildly displaced fracture of the fourth proximal phalanx with medial  displacement of the distal fracture fragment. Associated soft tissue  swelling.                     "                                                   IMPRESSION: Mildly displaced fracture of the fourth proximal phalanx.     ELLIOT VASQUEZ MD (Joe)

## 2021-05-28 NOTE — PATIENT INSTRUCTIONS
Thank you for choosing [facility] Podiatry / Foot & Ankle Surgery!    DR. HOOKS'S CLINIC LOCATIONS     [facility] SCHEDULE SURGERY: [phone]   [address] [place] [address] APPOINTMENTS: [phone]   [place] [address] BILLING QUESTIONS: [phone]  [phone] RADIOLOGY: [phone]       [place]    [address]    [place], [place] [address] [phone]  [phone]      Follow up: as needed    TOE & METATARSAL FRACTURES  The structure of the foot is complex, consisting of bones, muscles, tendons, and other soft tissues. Of the 26 bones in the foot, 19 are toe bones (phalanges) and metatarsal bones (the long bones in the midfoot). Fractures of the toe and metatarsal bones are common and require evaluation by a specialist. A foot and ankle surgeon should be seen for proper diagnosis and treatment, even if initial treatment has been received in an emergency room.  A fracture is a break in the bone. Fractures can be divided into two categories: traumatic fractures and stress fractures.  TRAUMATIC FRACTURES (also called acute fractures) are caused by a direct blow or impact, such as seriously stubbing your toe. Traumatic fractures can be displaced or non-displaced. If the fracture is displaced, the bone is broken in such a way that it has changed in position (dislocated).  Signs and symptoms of a traumatic fracture include:  You may hear a sound at the time of the break.    Pinpoint pain  (pain at the place of impact) at the time the fracture occurs and perhaps for a few hours later, but often the pain goes away after several hours.   Crooked or abnormal appearance of the toe.   Bruising and swelling the next day.   It is not true that  if you can walk on it, it s not broken.  Evaluation by a foot and ankle surgeon is always recommended.   STRESS FRACTURES are tiny, hairline breaks that are usually caused by repetitive stress. Stress fractures often afflict athletes who, for  example, too rapidly increase their running mileage. They can also be caused by an abnormal foot structure, deformities, or osteoporosis. Improper footwear may also lead to stress fractures. Stress fractures should not be ignored. They require proper medical attention to heal correctly.  Symptoms of stress fractures include:  Pain with or after normal activity   Pain that goes away when resting and then returns when standing or during activity    Pinpoint pain  (pain at the site of the fracture) when touched   Swelling, but no bruising   IMPROPER TREATMENT  Some people say that  the doctor can t do anything for a broken bone in the foot.  This is usually not true. In fact, if a fractured toe or metatarsal bone is not treated correctly, serious complications may develop. For example:  A deformity in the bony architecture which may limit the ability to move the foot or cause difficulty in fitting shoes   Arthritis, which may be caused by a fracture in a joint (the juncture where two bones meet), or may be a result of angular deformities that develop when a displaced fracture is severe or hasn t been properly corrected   Chronic pain and deformity   Non-union, or failure to heal, can lead to subsequent surgery or chronic pain.   PROPER TREATMENT FOR TOES  Fractures of the toe bones are almost always traumatic fractures. Treatment for traumatic fractures depends on the break itself and may include these options:  Rest. Sometimes rest is all that is needed to treat a traumatic fracture of the toe.   Splinting. The toe may be fitted with a splint to keep it in a fixed position.   Rigid or stiff-soled shoe. Wearing a stiff-soled shoe protects the toe and helps keep it properly positioned.    Allen taping  the fractured toe to another toe is sometimes appropriate, but in other cases it may be harmful.   Surgery. If the break is badly displaced or if the joint is affected, surgery may be necessary. Surgery often involves the  use of fixation devices, such as pins.   PROPER TREATMENT OF METATARSALS  Breaks in the metatarsal bones may be either stress or traumatic fractures. Certain kinds of fractures of the metatarsal bones present unique challenges.  For example, sometimes a fracture of the first metatarsal bone (behind the big toe) can lead to arthritis. Since the big toe is used so frequently and bears more weight than other toes, arthritis in that area can make it painful to walk, bend, or even stand.  Another type of break, called a Garza fracture, occurs at the base of the fifth metatarsal bone (behind the little toe). It is often misdiagnosed as an ankle sprain, and misdiagnosis can have serious consequences since sprains and fractures require different treatments. Your foot and ankle surgeon is an expert in correctly identifying these conditions as well as other problems of the foot.  Treatment of metatarsal fractures depends on the type and extent of the fracture, and may include:  Rest. Sometimes rest is the only treatment needed to promote healing of a stress or traumatic fracture of a metatarsal bone.   Avoid the offending activity. Because stress fractures result from repetitive stress, it is important to avoid the activity that led to the fracture. Crutches or a wheelchair are sometimes required to offload weight from the foot to give it time to heal.   Immobilization, casting, or rigid shoe. A stiff-soled shoe or other form of immobilization may be used to protect the fractured bone while it is healing.   Surgery. Some traumatic fractures of the metatarsal bones require surgery, especially if the break is badly displaced.   Follow-up care. Your foot and ankle surgeon will provide instructions for care following surgical or non-surgical treatment. Physical therapy, exercises and rehabilitation may be included in a schedule for return to normal activities.

## 2021-05-28 NOTE — LETTER
5/28/2021         RE: Caroline Pride  [address]  [facility] [address]        Dear Colleague,    Thank you for referring your patient, Caroline Pride, to the [facility] PODIATRY. Please see a copy of my visit note below.    ASSESSMENT:  Encounter Diagnosis   Name Primary?     Closed fracture of phalanx of left fourth toe, initial encounter        MEDICAL DECISION MAKING:  I reviewed the previous x-rays with Caroline.  There is some displacement, shortening.  Otherwise, anatomy is acceptable.  The fracture is nonarticular.  I do not think there is an indication for any close reduction or percutaneous pinning at this point.    She has been fairly active and is wearing a flexible running shoe.  Recommend that for the next month, she focus on a stiffer soled shoe for protection.  Buddy taping is optional and sometimes can lead to creating a deformity, if too aggressive.  Discussed using Coban for edema reduction.  Ice, NSAIDs and Tylenol as needed.    Follow-up in 1 month if ongoing pain or other concerns..    Disclaimer: This note consists of symbols derived from keyboarding, dictation and/or voice recognition software. As a result, there may be errors in the script that have gone undetected. Please consider this when interpreting information found in this chart.    Kareem Mackay DPM, FACFAS, [employer] Department of Podiatry/Foot & Ankle Surgery      ____________________________________________________________________    HPI:       I was asked by Kathleen Strickland MD to evaluate Caroline Pride in consultation for left 4th toe fracture.     Chief Complaint: broken toe, mildly displaced, 4th toe right foot  Onset of problem: 2 weeks; she stubbed her toe  Pain/ discomfort is described as:  shooting   Frequency:  daily    The pain is exacerbated by pushing off the foot  Previous treatment: she saw Dr. Strickland on 5/25/21. XR, surgical shoe, ibuprofen, buddy  "tapping  *  Past Medical History:   Diagnosis Date     Depressive disorder ongoing     PCOS (polycystic ovarian syndrome)      Subclinical hypothyroidism    *  *No past surgical history on file.*  *  Current Outpatient Medications   Medication Sig Dispense Refill     Calcium Carbonate-Vitamin D (CALCIUM-VITAMIN D3 PO)        Cyanocobalamin (VITAMIN B12 PO)        Evening Primrose Oil 1000 MG CAPS Take 1,200 mg by mouth daily       IRON PO        Omega-3 Fatty Acids (FISH OIL PO)        Prenatal Vit-Fe Fumarate-FA (PRENATAL MULTIVITAMIN  PLUS IRON) 27-0.8 MG TABS Take 1 tablet by mouth daily       Pyridoxine HCl (VITAMIN B6 PO)        docusate sodium (COLACE) 100 MG capsule Take 1 capsule (100 mg) by mouth 2 times daily as needed for constipation (Patient not taking: Reported on 5/25/2021) 60 capsule 0     valACYclovir (VALTREX) 1000 mg tablet Take 2 tablets (2,000 mg) by mouth 2 times daily for 1 day 12 tablet 6     EXAM:    Vitals: /86   Ht 1.753 m (5' 9\")   LMP 05/21/2021   BMI 29.83 kg/m    BMI: Body mass index is 29.83 kg/m .    Constitutional:  Caroline Pride is in no apparent distress, appears well-nourished.  Cooperative with history and physical exam.    Vascular:  Pedal pulses are palpable for both the DP and PT arteries.  CFT < 3 sec.  Mild edema, right 4th toe.    Neuro: Light touch sensation is intact to the L4, L5, S1 distributions  No evidence of weakness, spasticity, or contracture in the lower extremities.    Light touch sensation is intact at the distal right fourth toe.    Derm: Normal texture and turgor.  No erythema, ecchymosis, or cyanosis.  No open lesions.  Light touch sensation is intact at the distal right fourth toe.    Musculoskeletal:    Lower extremity muscle strength is normal. No gross deformities.  No deformity, on clinical exam, of the right fourth toe.  Flexor and extensor tendons intact.    X-Ray Findings:      EXAM: XR TOE RIGHT G/E 2 VIEWS  5/25/2021 12:05 PM  "      HISTORY: Pain of toe of right foot     COMPARISON: None     FINDINGS: 3 views of the right fourth toe.     Mildly displaced fracture of the fourth proximal phalanx with medial  displacement of the distal fracture fragment. Associated soft tissue  swelling.                                                                       IMPRESSION: Mildly displaced fracture of the fourth proximal phalanx.     ELLIOT (Harrison VASQUEZ MD          Again, thank you for allowing me to participate in the care of your patient.        Sincerely,        Kareem Mackay DPM

## 2021-09-29 NOTE — PLAN OF CARE
Data: Caroline Pride transferred to [id number] via wheelchair at 0925. Baby transferred via parent's arms.  Action: Receiving unit notified of transfer: Yes. Patient and family notified of room change. Report given to IGOR Madsen RN at 0930. Belongings sent to receiving unit. Accompanied by Registered Nurse. Oriented patient to surroundings. Call light within reach. ID bands double-checked with receiving RN.  Response: Patient tolerated transfer and is stable.   No

## 2021-10-03 ENCOUNTER — HEALTH MAINTENANCE LETTER (OUTPATIENT)
Age: 41
End: 2021-10-03

## 2021-12-20 ENCOUNTER — MYC REFILL (OUTPATIENT)
Dept: FAMILY MEDICINE | Facility: CLINIC | Age: 41
End: 2021-12-20
Payer: COMMERCIAL

## 2021-12-20 DIAGNOSIS — B00.1 COLD SORE: ICD-10-CM

## 2021-12-20 RX ORDER — VALACYCLOVIR HYDROCHLORIDE 1 G/1
2000 TABLET, FILM COATED ORAL 2 TIMES DAILY
Qty: 12 TABLET | Refills: 6 | Status: SHIPPED | OUTPATIENT
Start: 2021-12-20 | End: 2022-06-27

## 2021-12-20 NOTE — TELEPHONE ENCOUNTER
"Requested Prescriptions   Pending Prescriptions Disp Refills     valACYclovir (VALTREX) 1000 mg tablet 12 tablet 6     Sig: Take 2 tablets (2,000 mg) by mouth 2 times daily       Antivirals for Herpes Protocol Failed - 12/20/2021 12:25 PM        Failed - Normal serum creatinine on file in past 12 months     Recent Labs   Lab Test 05/13/20 2059   CR 0.48*       Ok to refill medication if creatinine is low          Passed - Patient is age 12 or older        Passed - Recent (12 mo) or future (30 days) visit within the authorizing provider's specialty     Patient has had an office visit with the authorizing provider or a provider within the authorizing providers department within the previous 12 mos or has a future within next 30 days. See \"Patient Info\" tab in inbasket, or \"Choose Columns\" in Meds & Orders section of the refill encounter.              Passed - Medication is active on med list           Routing refill request to provider for review/approval because:  Labs not current:  Rosana Bowden RN  [facility]           "

## 2022-04-06 ENCOUNTER — OFFICE VISIT (OUTPATIENT)
Dept: URGENT CARE | Facility: URGENT CARE | Age: 42
End: 2022-04-06
Payer: COMMERCIAL

## 2022-04-06 VITALS
RESPIRATION RATE: 16 BRPM | OXYGEN SATURATION: 98 % | BODY MASS INDEX: 30.01 KG/M2 | DIASTOLIC BLOOD PRESSURE: 84 MMHG | WEIGHT: 198 LBS | HEART RATE: 81 BPM | TEMPERATURE: 98.1 F | HEIGHT: 68 IN | SYSTOLIC BLOOD PRESSURE: 128 MMHG

## 2022-04-06 DIAGNOSIS — R07.0 THROAT PAIN: ICD-10-CM

## 2022-04-06 DIAGNOSIS — J02.0 STREP THROAT: Primary | ICD-10-CM

## 2022-04-06 LAB — DEPRECATED S PYO AG THROAT QL EIA: POSITIVE

## 2022-04-06 PROCEDURE — 99213 OFFICE O/P EST LOW 20 MIN: CPT | Performed by: NURSE PRACTITIONER

## 2022-04-06 PROCEDURE — 87880 STREP A ASSAY W/OPTIC: CPT | Performed by: NURSE PRACTITIONER

## 2022-04-06 RX ORDER — AMOXICILLIN 500 MG/1
500 CAPSULE ORAL 2 TIMES DAILY
Qty: 20 CAPSULE | Refills: 0 | Status: SHIPPED | OUTPATIENT
Start: 2022-04-06 | End: 2022-04-16

## 2022-04-06 NOTE — PROGRESS NOTES
"Chief Complaint   Patient presents with     Urgent Care     Pharyngitis     exposed to strep. Sore throat and fatigue, aches.      SUBJECTIVE:  Caroline Pride is a 41 year old female presenting with fever, sore throat, fatigue, myalgias starting 3 weeks ago. COVID negative. Son has strep.    Past Medical History:   Diagnosis Date     Depressive disorder ongoing     PCOS (polycystic ovarian syndrome)      Subclinical hypothyroidism      Calcium Carbonate-Vitamin D (CALCIUM-VITAMIN D3 PO),   Cyanocobalamin (VITAMIN B12 PO),   docusate sodium (COLACE) 100 MG capsule, Take 1 capsule (100 mg) by mouth 2 times daily as needed for constipation (Patient not taking: Reported on 5/25/2021)  Evening Primrose Oil 1000 MG CAPS, Take 1,200 mg by mouth daily  IRON PO,   Omega-3 Fatty Acids (FISH OIL PO),   Prenatal Vit-Fe Fumarate-FA (PRENATAL MULTIVITAMIN  PLUS IRON) 27-0.8 MG TABS, Take 1 tablet by mouth daily  Pyridoxine HCl (VITAMIN B6 PO),   valACYclovir (VALTREX) 1000 mg tablet, Take 2 tablets (2,000 mg) by mouth 2 times daily    lidocaine-EPINEPHrine 1.5 %-1:[id number] injection      Social History     Tobacco Use     Smoking status: Former Smoker     Packs/day: 0.00     Years: 0.00     Pack years: 0.00     Quit date: 1/13/2019     Years since quitting: 3.2     Smokeless tobacco: Never Used   Substance Use Topics     Alcohol use: Yes     Comment: occasional     No Known Allergies    Review of Systems   All systems negative except for those listed above in HPI.    OBJECTIVE:   /84   Pulse 81   Temp 98.1  F (36.7  C) (Temporal)   Resp 16   Ht 1.727 m (5' 8\")   Wt 89.8 kg (198 lb)   LMP 03/23/2022   SpO2 98%   Breastfeeding No   BMI 30.11 kg/m      Physical Exam  Constitutional:       General: She is not in acute distress.     Appearance: She is well-developed. She is ill-appearing. She is not toxic-appearing or diaphoretic.   HENT:      Head: Normocephalic and atraumatic.      Nose: Nose normal.      " "Mouth/Throat:      Pharynx: Posterior oropharyngeal erythema present. No oropharyngeal exudate.   Eyes:      Conjunctiva/sclera: Conjunctivae normal.      Pupils: Pupils are equal, round, and reactive to light.   Cardiovascular:      Rate and Rhythm: Normal rate.      Pulses: Normal pulses.   Pulmonary:      Effort: Pulmonary effort is normal. No respiratory distress.      Breath sounds: No stridor. No wheezing, rhonchi or rales.   Chest:      Chest wall: No tenderness.   Musculoskeletal:         General: Normal range of motion.      Cervical back: Normal range of motion and neck supple.   Lymphadenopathy:      Cervical: Cervical adenopathy present.   Skin:     General: Skin is warm.      Capillary Refill: Capillary refill takes less than 2 seconds.      Findings: No rash.   Neurological:      Mental Status: She is alert and oriented to person, place, and time.      Deep Tendon Reflexes: Abnormal reflex:        Results for orders placed or performed in visit on 04/06/22   Streptococcus A Rapid Screen w/Reflex to PCR - Clinic Collect     Status: Abnormal    Specimen: Throat; Swab   Result Value Ref Range    Group A Strep antigen Positive (A) Negative     ASSESSMENT:    ICD-10-CM    1. Strep throat  J02.0 amoxicillin (AMOXIL) 500 MG capsule   2. Throat pain  R07.0 Streptococcus A Rapid Screen w/Reflex to PCR - Clinic Collect     PLAN:     Antibiotics as directed for strep.  Drink plenty of fluids and rest.  May use salt water gargles- about 8 oz warm water with about 1 teaspoon salt  Sucrets and Cepacol spray are over the counter medications that numb the throat.  Over the counter pain relievers such as tylenol or ibuprofen may be used as needed.   Honey lemon tea helps to soothe the throat. \"Throat Coat\" tea is soothing as well.  Change toothbrush after 24 hours of antibiotics (may soak in 3-6% hydrogen peroxide)  Will be contagious for 24 hours after starting antibiotic  May return to school//work/activities " 24 hours after antibiotics are started.  Wash hands frequently and do not share beverages.  Please follow up with primary care provider if symptoms are not improving, worsening or new symptoms or for any adverse reactions to medications.     Follow up with primary care provider with any problems, questions or concerns or if symptoms worsen or fail to improve. Patient agreed to plan and verbalized understanding.    CARIN Trotter-BC  [facility]

## 2022-05-15 ENCOUNTER — HEALTH MAINTENANCE LETTER (OUTPATIENT)
Age: 42
End: 2022-05-15

## 2022-06-22 ENCOUNTER — OFFICE VISIT (OUTPATIENT)
Dept: URGENT CARE | Facility: URGENT CARE | Age: 42
End: 2022-06-22
Payer: COMMERCIAL

## 2022-06-22 VITALS
RESPIRATION RATE: 15 BRPM | HEIGHT: 68 IN | DIASTOLIC BLOOD PRESSURE: 84 MMHG | HEART RATE: 97 BPM | TEMPERATURE: 97.5 F | BODY MASS INDEX: 28.49 KG/M2 | SYSTOLIC BLOOD PRESSURE: 122 MMHG | OXYGEN SATURATION: 99 % | WEIGHT: 188 LBS

## 2022-06-22 DIAGNOSIS — R10.2 PELVIC PAIN IN FEMALE: ICD-10-CM
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
## 2022-06-22 DIAGNOSIS — S70.361A TICK BITE OF RIGHT THIGH, INITIAL ENCOUNTER: ICD-10-CM

## 2022-06-22 DIAGNOSIS — R53.83 FATIGUE, UNSPECIFIED TYPE: Primary | ICD-10-CM

## 2022-06-22 DIAGNOSIS — M25.50 ARTHRALGIA, UNSPECIFIED JOINT: ICD-10-CM

## 2022-06-22 LAB
BASOPHILS # BLD AUTO: 0 10E3/UL (ref 0–0.2)
BASOPHILS NFR BLD AUTO: 0 %
CRP SERPL-MCNC: 150 MG/L
EOSINOPHIL # BLD AUTO: 0.2 10E3/UL (ref 0–0.7)
EOSINOPHIL NFR BLD AUTO: 2 %
ERYTHROCYTE [DISTWIDTH] IN BLOOD BY AUTOMATED COUNT: 12.7 % (ref 10–15)
ERYTHROCYTE [SEDIMENTATION RATE] IN BLOOD BY WESTERGREN METHOD: 85 MM/HR (ref 0–20)
HCT VFR BLD AUTO: 35.3 % (ref 35–47)
HGB BLD-MCNC: 11.8 G/DL (ref 11.7–15.7)
IMM GRANULOCYTES # BLD: 0 10E3/UL
IMM GRANULOCYTES NFR BLD: 0 %
LYMPHOCYTES # BLD AUTO: 1.3 10E3/UL (ref 0.8–5.3)
LYMPHOCYTES NFR BLD AUTO: 9 %
MCH RBC QN AUTO: 28.4 PG (ref 26.5–33)
MCHC RBC AUTO-ENTMCNC: 33.4 G/DL (ref 31.5–36.5)
MCV RBC AUTO: 85 FL (ref 78–100)
MONOCYTES # BLD AUTO: 0.9 10E3/UL (ref 0–1.3)
MONOCYTES NFR BLD AUTO: 6 %
NEUTROPHILS # BLD AUTO: 11.8 10E3/UL (ref 1.6–8.3)
NEUTROPHILS NFR BLD AUTO: 83 %
PLATELET # BLD AUTO: 432 10E3/UL (ref 150–450)
RBC # BLD AUTO: 4.16 10E6/UL (ref 3.8–5.2)
VIT B12 SERPL-MCNC: 297 PG/ML (ref 232–1245)
WBC # BLD AUTO: 14.2 10E3/UL (ref 4–11)

## 2022-06-22 PROCEDURE — 82306 VITAMIN D 25 HYDROXY: CPT | Performed by: PHYSICIAN ASSISTANT

## 2022-06-22 PROCEDURE — 86757 RICKETTSIA ANTIBODY: CPT | Mod: 90 | Performed by: PHYSICIAN ASSISTANT

## 2022-06-22 PROCEDURE — 99000 SPECIMEN HANDLING OFFICE-LAB: CPT | Performed by: PHYSICIAN ASSISTANT

## 2022-06-22 PROCEDURE — 80050 GENERAL HEALTH PANEL: CPT | Performed by: PHYSICIAN ASSISTANT

## 2022-06-22 PROCEDURE — 83735 ASSAY OF MAGNESIUM: CPT | Performed by: PHYSICIAN ASSISTANT

## 2022-06-22 PROCEDURE — 85652 RBC SED RATE AUTOMATED: CPT | Performed by: PHYSICIAN ASSISTANT

## 2022-06-22 PROCEDURE — 99214 OFFICE O/P EST MOD 30 MIN: CPT | Performed by: PHYSICIAN ASSISTANT

## 2022-06-22 PROCEDURE — 82607 VITAMIN B-12: CPT | Performed by: PHYSICIAN ASSISTANT

## 2022-06-22 PROCEDURE — 86618 LYME DISEASE ANTIBODY: CPT | Performed by: PHYSICIAN ASSISTANT

## 2022-06-22 PROCEDURE — 36415 COLL VENOUS BLD VENIPUNCTURE: CPT | Performed by: PHYSICIAN ASSISTANT

## 2022-06-22 PROCEDURE — 86140 C-REACTIVE PROTEIN: CPT | Performed by: PHYSICIAN ASSISTANT

## 2022-06-23 ENCOUNTER — APPOINTMENT (OUTPATIENT)
Dept: CT IMAGING | Facility: CLINIC | Age: 42
End: 2022-06-23
Attending: EMERGENCY MEDICINE
Payer: COMMERCIAL

## 2022-06-23 ENCOUNTER — MYC MEDICAL ADVICE (OUTPATIENT)
Dept: FAMILY MEDICINE | Facility: CLINIC | Age: 42
End: 2022-06-23

## 2022-06-23 ENCOUNTER — TELEPHONE (OUTPATIENT)
Dept: FAMILY MEDICINE | Facility: CLINIC | Age: 42
End: 2022-06-23

## 2022-06-23 ENCOUNTER — HOSPITAL ENCOUNTER (INPATIENT)
Facility: CLINIC | Age: 42
LOS: 3 days | Discharge: HOME | End: 2022-06-26
Attending: EMERGENCY MEDICINE | Admitting: PEDIATRICS
Payer: COMMERCIAL

## 2022-06-23 ENCOUNTER — HOSPITAL ENCOUNTER (OUTPATIENT)
Dept: ULTRASOUND IMAGING | Facility: CLINIC | Age: 42
Discharge: HOME | End: 2022-06-23
Attending: PHYSICIAN ASSISTANT | Admitting: PHYSICIAN ASSISTANT
Payer: COMMERCIAL

## 2022-06-23 DIAGNOSIS — R10.2 PELVIC PAIN IN FEMALE: ICD-10-CM

## 2022-06-23 DIAGNOSIS — Z20.822 CONTACT WITH AND (SUSPECTED) EXPOSURE TO COVID-19: ICD-10-CM

## 2022-06-23 DIAGNOSIS — N28.9 RENAL LESION: ICD-10-CM

## 2022-06-23 DIAGNOSIS — K35.32 PERFORATED APPENDICITIS: ICD-10-CM

## 2022-06-23 LAB
ALBUMIN SERPL-MCNC: 3.1 G/DL (ref 3.4–5)
ALBUMIN UR-MCNC: 30 MG/DL
ALP SERPL-CCNC: 70 U/L (ref 40–150)
ALT SERPL W P-5'-P-CCNC: 15 U/L (ref 0–50)
ANION GAP SERPL CALCULATED.3IONS-SCNC: 9 MMOL/L (ref 3–14)
APPEARANCE UR: CLEAR
AST SERPL W P-5'-P-CCNC: 10 U/L (ref 0–45)
B BURGDOR IGG+IGM SER QL: 0.11
BACTERIA #/AREA URNS HPF: ABNORMAL /HPF
BASOPHILS # BLD AUTO: 0.1 10E3/UL (ref 0–0.2)
BASOPHILS NFR BLD AUTO: 0 %
BILIRUB SERPL-MCNC: 0.5 MG/DL (ref 0.2–1.3)
BILIRUB UR QL STRIP: NEGATIVE
BUN SERPL-MCNC: 13 MG/DL (ref 7–30)
CALCIUM SERPL-MCNC: 8.8 MG/DL (ref 8.5–10.1)
CHLORIDE BLD-SCNC: 103 MMOL/L (ref 94–109)
CO2 SERPL-SCNC: 22 MMOL/L (ref 20–32)
COLOR UR AUTO: YELLOW
CREAT SERPL-MCNC: 0.6 MG/DL (ref 0.52–1.04)
DEPRECATED CALCIDIOL+CALCIFEROL SERPL-MC: 34 UG/L (ref 20–75)
EOSINOPHIL # BLD AUTO: 0.1 10E3/UL (ref 0–0.7)
EOSINOPHIL NFR BLD AUTO: 1 %
ERYTHROCYTE [DISTWIDTH] IN BLOOD BY AUTOMATED COUNT: 12.8 % (ref 10–15)
GFR SERPL CREATININE-BSD FRML MDRD: >90 ML/MIN/1.73M2
GLUCOSE BLD-MCNC: 112 MG/DL (ref 70–99)
GLUCOSE UR STRIP-MCNC: NEGATIVE MG/DL
HCG SERPL QL: NEGATIVE
HCT VFR BLD AUTO: 34.1 % (ref 35–47)
HGB BLD-MCNC: 11.3 G/DL (ref 11.7–15.7)
HGB UR QL STRIP: ABNORMAL
HOLD SPECIMEN: NORMAL
HOLD SPECIMEN: NORMAL
IMM GRANULOCYTES # BLD: 0.1 10E3/UL
IMM GRANULOCYTES NFR BLD: 1 %
INR PPP: 1.27 (ref 0.85–1.15)
KETONES UR STRIP-MCNC: NEGATIVE MG/DL
LACTATE SERPL-SCNC: 0.6 MMOL/L (ref 0.7–2)
LEUKOCYTE ESTERASE UR QL STRIP: ABNORMAL
LYMPHOCYTES # BLD AUTO: 1.4 10E3/UL (ref 0.8–5.3)
LYMPHOCYTES NFR BLD AUTO: 9 %
MCH RBC QN AUTO: 28.8 PG (ref 26.5–33)
MCHC RBC AUTO-ENTMCNC: 33.1 G/DL (ref 31.5–36.5)
MCV RBC AUTO: 87 FL (ref 78–100)
MONOCYTES # BLD AUTO: 1 10E3/UL (ref 0–1.3)
MONOCYTES NFR BLD AUTO: 6 %
MUCOUS THREADS #/AREA URNS LPF: PRESENT /LPF
NEUTROPHILS # BLD AUTO: 12.7 10E3/UL (ref 1.6–8.3)
NEUTROPHILS NFR BLD AUTO: 83 %
NITRATE UR QL: NEGATIVE
NRBC # BLD AUTO: 0 10E3/UL
NRBC BLD AUTO-RTO: 0 /100
PH UR STRIP: 5.5 [PH] (ref 5–7)
PLATELET # BLD AUTO: 397 10E3/UL (ref 150–450)
POTASSIUM BLD-SCNC: 4.4 MMOL/L (ref 3.4–5.3)
PROT SERPL-MCNC: 8.3 G/DL (ref 6.8–8.8)
RBC # BLD AUTO: 3.93 10E6/UL (ref 3.8–5.2)
RBC URINE: 1 /HPF
SARS-COV-2 RNA RESP QL NAA+PROBE: NEGATIVE
SODIUM SERPL-SCNC: 134 MMOL/L (ref 133–144)
SP GR UR STRIP: 1.02 (ref 1–1.03)
SQUAMOUS EPITHELIAL: 1 /HPF
TRANSITIONAL EPI: <1 /HPF
UROBILINOGEN UR STRIP-MCNC: NORMAL MG/DL
WBC # BLD AUTO: 15.4 10E3/UL (ref 4–11)
WBC URINE: 2 /HPF

## 2022-06-23 PROCEDURE — 74177 CT ABD & PELVIS W/CONTRAST: CPT

## 2022-06-23 PROCEDURE — 258N000003 HC RX IP 258 OP 636: Performed by: EMERGENCY MEDICINE

## 2022-06-23 PROCEDURE — 87086 URINE CULTURE/COLONY COUNT: CPT | Performed by: EMERGENCY MEDICINE

## 2022-06-23 PROCEDURE — 85025 COMPLETE CBC W/AUTO DIFF WBC: CPT | Performed by: EMERGENCY MEDICINE

## 2022-06-23 PROCEDURE — 250N000009 HC RX 250: Performed by: EMERGENCY MEDICINE

## 2022-06-23 PROCEDURE — 36415 COLL VENOUS BLD VENIPUNCTURE: CPT | Performed by: EMERGENCY MEDICINE

## 2022-06-23 PROCEDURE — 120N000002 HC R&B MED SURG/OB UMMC

## 2022-06-23 PROCEDURE — 81001 URINALYSIS AUTO W/SCOPE: CPT | Performed by: EMERGENCY MEDICINE

## 2022-06-23 PROCEDURE — 99222 1ST HOSP IP/OBS MODERATE 55: CPT | Mod: AI | Performed by: PEDIATRICS

## 2022-06-23 PROCEDURE — 250N000013 HC RX MED GY IP 250 OP 250 PS 637: Performed by: EMERGENCY MEDICINE

## 2022-06-23 PROCEDURE — 99285 EMERGENCY DEPT VISIT HI MDM: CPT | Performed by: EMERGENCY MEDICINE

## 2022-06-23 PROCEDURE — 76856 US EXAM PELVIC COMPLETE: CPT | Mod: 26 | Performed by: RADIOLOGY

## 2022-06-23 PROCEDURE — 250N000013 HC RX MED GY IP 250 OP 250 PS 637: Performed by: FAMILY MEDICINE

## 2022-06-23 PROCEDURE — 250N000011 HC RX IP 250 OP 636: Performed by: EMERGENCY MEDICINE

## 2022-06-23 PROCEDURE — 84703 CHORIONIC GONADOTROPIN ASSAY: CPT | Performed by: EMERGENCY MEDICINE

## 2022-06-23 PROCEDURE — 83605 ASSAY OF LACTIC ACID: CPT | Performed by: EMERGENCY MEDICINE

## 2022-06-23 PROCEDURE — 84155 ASSAY OF PROTEIN SERUM: CPT | Performed by: EMERGENCY MEDICINE

## 2022-06-23 PROCEDURE — 96365 THER/PROPH/DIAG IV INF INIT: CPT | Mod: 59 | Performed by: EMERGENCY MEDICINE

## 2022-06-23 PROCEDURE — 87040 BLOOD CULTURE FOR BACTERIA: CPT | Performed by: EMERGENCY MEDICINE

## 2022-06-23 PROCEDURE — 86140 C-REACTIVE PROTEIN: CPT | Performed by: PEDIATRICS

## 2022-06-23 PROCEDURE — 87635 SARS-COV-2 COVID-19 AMP PRB: CPT | Performed by: EMERGENCY MEDICINE

## 2022-06-23 PROCEDURE — 76856 US EXAM PELVIC COMPLETE: CPT

## 2022-06-23 PROCEDURE — 99285 EMERGENCY DEPT VISIT HI MDM: CPT | Mod: 25 | Performed by: EMERGENCY MEDICINE

## 2022-06-23 PROCEDURE — C9803 HOPD COVID-19 SPEC COLLECT: HCPCS | Performed by: EMERGENCY MEDICINE

## 2022-06-23 PROCEDURE — 85610 PROTHROMBIN TIME: CPT | Performed by: EMERGENCY MEDICINE

## 2022-06-23 RX ORDER — DIPHENHYDRAMINE HCL 25 MG
25 CAPSULE ORAL ONCE
Status: COMPLETED | OUTPATIENT
Start: 2022-06-23 | End: 2022-06-23

## 2022-06-23 RX ORDER — PIPERACILLIN SODIUM, TAZOBACTAM SODIUM 4; .5 G/20ML; G/20ML
4.5 INJECTION, POWDER, LYOPHILIZED, FOR SOLUTION INTRAVENOUS ONCE
Status: COMPLETED | OUTPATIENT
Start: 2022-06-23 | End: 2022-06-23

## 2022-06-23 RX ORDER — IOPAMIDOL 755 MG/ML
100 INJECTION, SOLUTION INTRAVASCULAR ONCE
Status: COMPLETED | OUTPATIENT
Start: 2022-06-23 | End: 2022-06-23

## 2022-06-23 RX ORDER — IBUPROFEN 600 MG/1
600 TABLET, FILM COATED ORAL ONCE
Status: COMPLETED | OUTPATIENT
Start: 2022-06-23 | End: 2022-06-23

## 2022-06-23 RX ADMIN — IOPAMIDOL 89 ML: 755 INJECTION, SOLUTION INTRAVENOUS at 19:54

## 2022-06-23 RX ADMIN — DIPHENHYDRAMINE HYDROCHLORIDE 25 MG: 25 CAPSULE ORAL at 21:21

## 2022-06-23 RX ADMIN — IBUPROFEN 600 MG: 600 TABLET ORAL at 16:20

## 2022-06-23 RX ADMIN — SODIUM CHLORIDE 62 ML: 9 INJECTION, SOLUTION INTRAVENOUS at 19:54

## 2022-06-23 RX ADMIN — SODIUM CHLORIDE 1000 ML: 9 INJECTION, SOLUTION INTRAVENOUS at 17:20

## 2022-06-23 RX ADMIN — PIPERACILLIN AND TAZOBACTAM 4.5 G: 4; .5 INJECTION, POWDER, FOR SOLUTION INTRAVENOUS at 20:51

## 2022-06-23 ASSESSMENT — ENCOUNTER SYMPTOMS
DIFFICULTY URINATING: 0
NAUSEA: 0
FEVER: 0
SORE THROAT: 0
RHINORRHEA: 0
CHILLS: 0
ABDOMINAL PAIN: 1
FREQUENCY: 0
COUGH: 0
DIARRHEA: 0
HEMATURIA: 0
COLOR CHANGE: 0
CONFUSION: 0
SHORTNESS OF BREATH: 0
ARTHRALGIAS: 0
HEADACHES: 0
NECK STIFFNESS: 0
VOMITING: 0
DYSURIA: 0

## 2022-06-23 ASSESSMENT — ACTIVITIES OF DAILY LIVING (ADL)
ADLS_ACUITY_SCORE: 35
ADLS_ACUITY_SCORE: 35

## 2022-06-23 NOTE — ED PROVIDER NOTES
"    [facility] EMERGENCY DEPARTMENT ([facility])    6/23/22     ED 5 4:47 PM     History     Chief Complaint   Patient presents with     ultrasound today sent her over as she had something \"on it\"     The history is provided by the patient and medical records.     Caroline Pride is a 41 year old female who presents with abdominal pain for the past 2.5 weeks.  Of note, she is a rather vague historian.  She states that 2.5 weeks ago she had an episode of severe pain she was rolling around in bed and could not find a position of comfort.  She had nausea and vomiting with this.  At that time it was a diffuse abdominal pain along with constipation.  She states that she has had a dull constant abdominal pain since then.  She states that in the past 2 and half weeks that she has had maybe 2 or 3 days of reprieve from the pain but otherwise it has been persistent.  She has tried \"flushing herself out\" with laxatives and stool softeners but afterwards felt very sleepy and the pain became more localized to the right lower quadrant.  She has been feeling fatigued and having difficulty staying awake due to how tired she feels.  Her right lower quadrant has been very tender, and worsens with movement, walking and bending.  She has been taking the past week off and resting in bed but pain has been persisting.  She presented to urgent care clinic and had work-up with labs and pelvic ultrasound.  The ultrasound was completed today and showed ill-defined fluid collection and inflammatory changes in the right lower quadrant with broad differential including ruptured appendicitis or inflammatory bowel disease.  She was sent here for further evaluation and work-up.  She last ate at around 7 AM today, but did drink about a cup of water around 4:30 AM today to take some ibuprofen. At home she hasn't been checking her temperature. No nausea or vomiting lately, last vomited 2.5 weeks ago.  She has not taken any laxatives in past " couple of days. Last bowel movement was 2 days ago, thinks stool building up again.  No diarrhea.    Denies burning dysuria, urinary frequency. Last menstrual period was about a month ago, but notes that she has irregular periods at baseline and does not think that she is pregnant. Denies chance of pregnancy. No prior abdominal surgery.  No cough, cold, rhinorrhea, sore throat, chest pain, or shortness of breath. Has had COVID-19 vaccination x 3 doses, booster dose in 2022. History of PCOS. No daily medications no known drug allergies.     Past Medical History  Past Medical History:   Diagnosis Date     Depressive disorder ongoing     PCOS (polycystic ovarian syndrome)      Subclinical hypothyroidism      No past surgical history on file.  Calcium Carbonate-Vitamin D (CALCIUM-VITAMIN D3 PO)  Cyanocobalamin (VITAMIN B12 PO)  docusate sodium (COLACE) 100 MG capsule  Evening Primrose Oil 1000 MG CAPS  IRON PO  Omega-3 Fatty Acids (FISH OIL PO)  Prenatal Vit-Fe Fumarate-FA (PRENATAL MULTIVITAMIN  PLUS IRON) 27-0.8 MG TABS  Pyridoxine HCl (VITAMIN B6 PO)  valACYclovir (VALTREX) 1000 mg tablet      No Known Allergies  Family History  Family History   Problem Relation Age of Onset     Thyroid Disease Mother      Anemia Mother      Depression Father      Anxiety Disorder Father      Heart Disease Father      Hypertension Sister      Anxiety Disorder Sister      Thyroid Disease Sister      Breast Cancer Maternal Grandmother          at 42y     Parkinsonism Maternal Uncle      Hypertension Sister      Anxiety Disorder Sister      Thyroid Disease Sister      Social History   Social History     Tobacco Use     Smoking status: Former Smoker     Packs/day: 0.00     Years: 0.00     Pack years: 0.00     Quit date: 2019     Years since quitting: 3.4     Smokeless tobacco: Never Used   Substance Use Topics     Alcohol use: Yes     Comment: occasional     Drug use: No      Past medical history, past surgical history,  "medications, allergies, family history, and social history were reviewed with the patient. No additional pertinent items.       Review of Systems   Constitutional: Negative for chills and fever.   HENT: Negative for congestion, rhinorrhea and sore throat.    Respiratory: Negative for cough and shortness of breath.    Cardiovascular: Negative for chest pain.   Gastrointestinal: Positive for abdominal pain. Negative for diarrhea, nausea and vomiting.   Genitourinary: Negative for difficulty urinating, dysuria, frequency, hematuria and urgency.   Musculoskeletal: Negative for arthralgias and neck stiffness.   Skin: Negative for color change.   Neurological: Negative for headaches.   Psychiatric/Behavioral: Negative for confusion.   All other systems reviewed and are negative.    A complete review of systems was performed with pertinent positives and negatives noted in the HPI, and all other systems negative.    Physical Exam   BP: 122/71  Pulse: 83  Temp: (S) (!) 102.1  F (38.9  C) (pt states \"i always have a temp\")  Resp: 18  SpO2: 97 %  Physical Exam  Vitals and nursing note reviewed.   Constitutional:       Appearance: She is well-developed. She is not diaphoretic.      Comments: Adult female, alert, cooperative   HENT:      Head: Normocephalic and atraumatic.   Eyes:      Conjunctiva/sclera: Conjunctivae normal.      Pupils: Pupils are equal, round, and reactive to light.   Cardiovascular:      Rate and Rhythm: Normal rate and regular rhythm.      Heart sounds: Normal heart sounds.   Pulmonary:      Effort: Pulmonary effort is normal. No respiratory distress.      Breath sounds: Normal breath sounds. No wheezing or rales.   Abdominal:      General: There is no distension.      Palpations: Abdomen is soft.      Tenderness: There is abdominal tenderness. There is guarding.      Comments: Abdomen is soft, maximally TTP in RLQ with some voluntary guarding. Mild L sided TTP (? Referred pain) without guarding. Hypoactive " bowel sounds .   Skin:     General: Skin is warm and dry.   Neurological:      Mental Status: She is alert and oriented to person, place, and time.         ED Course      Procedures       The medical record was reviewed and interpreted.  Current labs reviewed and interpreted.  Previous labs reviewed and interpreted.              Results for orders placed or performed during the hospital encounter of 06/23/22   CT Abdomen Pelvis w Contrast     Status: None    Narrative    EXAM: CT ABDOMEN PELVIS W CONTRAST  LOCATION: [facility]  DATE/TIME: 6/23/2022 6:13 PM    INDICATION: Three weeks of abdominal pain, ultrasound today showed ill-defined possible abscess in the right lower quadrant, evaluate for possible ruptured appendicitis.    COMPARISON: None.    TECHNIQUE: CT scan of the abdomen and pelvis was performed following injection of IV contrast. Multiplanar reformats were obtained. Dose reduction techniques were used.    CONTRAST: 89 mL Isovue 370.    FINDINGS:   LOWER CHEST: Normal.    HEPATOBILIARY: Normal.    PANCREAS: Normal.    SPLEEN: Normal.    ADRENAL GLANDS: Normal.    KIDNEYS/BLADDER: There is a 4.9 cm solid and cystic mass lower pole left kidney. Multiple cystic spaces are present. This is indeterminant. Abscess would be one consideration and short-term follow-up is recommended. There is a vague 1.4 cm hypodense   region in the lateral upper right kidney which is also indeterminant and warrants follow-up.    BOWEL: Large amount of inflammatory change in the right lower abdomen with significant reactive wall thickening cecal tip and adjacent terminal ileum. No defined appendix, and findings suggest perforated acute appendicitis. There is ill-defined phlegmon   in the right lower small bowel mesentery measuring 2.6 cm.    LYMPH NODES: Normal.    VASCULATURE: Unremarkable.    PELVIC ORGANS: Cystic region in the right adnexa likely ovarian cyst, although discrete  right ovary not visualized. Cannot exclude pelvic abscess. follicle left ovary.    MUSCULOSKELETAL: Normal.      Impression    IMPRESSION:   1.  Acute perforated appendicitis with ill-defined 2.6 cm phlegmon in the right lower small bowel mesentery and surrounding reactive lymph nodes. Discrete appendix is not visualized. There is significant reactive wall thickening of the cecal tip and   adjacent terminal ileum.  2.  There is a 4.2 cm complex cystic region in the right adnexa presumably right complex ovarian cyst, although discrete ovary not visualized. Pelvic abscess would also be a diagnostic consideration.    3.  Indeterminate 4.9 cm solid and cystic mass lower pole left kidney. Multiple cystic spaces are present. Abscess versus malignancy. Follow-up suggested. There is also a vague 1.4 cm hypodense region lateral aspect upper pole right kidney which should   be followed and is indeterminate.     Comprehensive metabolic panel     Status: Abnormal   Result Value Ref Range    Sodium 134 133 - 144 mmol/L    Potassium 4.4 3.4 - 5.3 mmol/L    Chloride 103 94 - 109 mmol/L    Carbon Dioxide (CO2) 22 20 - 32 mmol/L    Anion Gap 9 3 - 14 mmol/L    Urea Nitrogen 13 7 - 30 mg/dL    Creatinine 0.60 0.52 - 1.04 mg/dL    Calcium 8.8 8.5 - 10.1 mg/dL    Glucose 112 (H) 70 - 99 mg/dL    Alkaline Phosphatase 70 40 - 150 U/L    AST 10 0 - 45 U/L    ALT 15 0 - 50 U/L    Protein Total 8.3 6.8 - 8.8 g/dL    Albumin 3.1 (L) 3.4 - 5.0 g/dL    Bilirubin Total 0.5 0.2 - 1.3 mg/dL    GFR Estimate >90 >60 mL/min/1.73m2   Lactic acid whole blood STAT     Status: Abnormal   Result Value Ref Range    Lactic Acid 0.6 (L) 0.7 - 2.0 mmol/L   [place] Draw     Status: None (In process)    Narrative    The following orders were created for panel order [place] Draw.  Procedure                               Abnormality         Status                     ---------                               -----------         ------                     Extra  Blue Top Tube[[id number]]                                                         Extra Red Top Tube[[id number]]                                                          Extra Green Top (Lithium...[[id number]]                      Final result               Extra Purple Top Tube[[id number]]                            Final result                 Please view results for these tests on the individual orders.   CBC with platelets and differential     Status: Abnormal   Result Value Ref Range    WBC Count 15.4 (H) 4.0 - 11.0 10e3/uL    RBC Count 3.93 3.80 - 5.20 10e6/uL    Hemoglobin 11.3 (L) 11.7 - 15.7 g/dL    Hematocrit 34.1 (L) 35.0 - 47.0 %    MCV 87 78 - 100 fL    MCH 28.8 26.5 - 33.0 pg    MCHC 33.1 31.5 - 36.5 g/dL    RDW 12.8 10.0 - 15.0 %    Platelet Count 397 150 - 450 10e3/uL    % Neutrophils 83 %    % Lymphocytes 9 %    % Monocytes 6 %    % Eosinophils 1 %    % Basophils 0 %    % Immature Granulocytes 1 %    NRBCs per 100 WBC 0 <1 /100    Absolute Neutrophils 12.7 (H) 1.6 - 8.3 10e3/uL    Absolute Lymphocytes 1.4 0.8 - 5.3 10e3/uL    Absolute Monocytes 1.0 0.0 - 1.3 10e3/uL    Absolute Eosinophils 0.1 0.0 - 0.7 10e3/uL    Absolute Basophils 0.1 0.0 - 0.2 10e3/uL    Absolute Immature Granulocytes 0.1 <=0.4 10e3/uL    Absolute NRBCs 0.0 10e3/uL   Extra Green Top (Lithium Heparin) Tube     Status: None   Result Value Ref Range    Hold Specimen [facility]    Extra Purple Top Tube     Status: None   Result Value Ref Range    Hold Specimen [facility]    UA with Microscopic reflex to Culture     Status: Abnormal    Specimen: Urine, Clean Catch   Result Value Ref Range    Color Urine Yellow Colorless, Straw, Light Yellow, Yellow    Appearance Urine Clear Clear    Glucose Urine Negative Negative mg/dL    Bilirubin Urine Negative Negative    Ketones Urine Negative Negative mg/dL    Specific Gravity Urine 1.022 1.003 - 1.035    Blood Urine Small (A) Negative    pH Urine 5.5 5.0 - 7.0    Protein Albumin Urine 30  (A) Negative mg/dL     Urobilinogen Urine Normal Normal, 2.0 mg/dL    Nitrite Urine Negative Negative    Leukocyte Esterase Urine Trace (A) Negative    Bacteria Urine Few (A) None Seen /HPF    Mucus Urine Present (A) None Seen /LPF    RBC Urine 1 <=2 /HPF    WBC Urine 2 <=5 /HPF    Squamous Epithelials Urine 1 <=1 /HPF    Transitional Epithelials Urine <1 <=1 /HPF    Narrative    Urine Culture not indicated   HCG qualitative pregnancy (blood)     Status: Normal   Result Value Ref Range    hCG Serum Qualitative Negative Negative   Asymptomatic COVID-19 Virus (Coronavirus) by PCR Nasopharyngeal     Status: Normal    Specimen: Nasopharyngeal; Swab   Result Value Ref Range    SARS CoV2 PCR Negative Negative    Narrative    Testing was performed using the el  SARS-CoV-2 & Influenza A/B Assay on the el  Zoe  System.  This test should be ordered for the detection of SARS-COV-2 in individuals who meet SARS-CoV-2 clinical and/or epidemiological criteria. Test performance is unknown in asymptomatic patients.  This test is for in vitro diagnostic use under the FDA EUA for laboratories certified under CLIA to perform moderate and/or high complexity testing. This test has not been FDA cleared or approved.  A negative test does not rule out the presence of PCR inhibitors in the specimen or target RNA in concentration below the limit of detection for the assay. The possibility of a false negative should be considered if the patient's recent exposure or clinical presentation suggests COVID-19.  [facility] are certified under the Clinical Laboratory Improvement Amendments of 1988 (CLIA-88) as qualified to perform moderate and/or high complexity laboratory testing.   CBC with platelets differential     Status: Abnormal    Narrative    The following orders were created for panel order CBC with platelets differential.  Procedure                               Abnormality         Status                     ---------                                -----------         ------                     CBC with platelets and d...[[id number]]  Abnormal            Final result                 Please view results for these tests on the individual orders.   Results for orders placed or performed during the hospital encounter of 06/23/22   US Pelvis Complete without Transvaginal     Status: None    Narrative    EXAMINATION: US PELVIC TRANSABDOMINAL AND TRANSVAGINAL 6/23/2022 3:17  PM      CLINICAL HISTORY: Pelvic pain in female    COMPARISON: 4/8/2020        PROCEDURE COMMENTS: Ultrasound of the pelvis was performed without  Doppler.    FINDINGS:  Right ovary: Not visualized.  Left ovary: 3.9 x 2.5 x 2.8 cm  Uterus: 7.4 x 7.3 x 4.7 cm  Endometrial stripe: Poorly visualized, not overtly thickened.    The right ovary is nonvisualized. The left ovary is unremarkable. The  uterus is normal in morphology and echogenicity. The urinary bladder  is well distended and appears normal.     There is ill-defined fluid measuring 7.7 x 8.3 x 7.2 cm with  surrounding inflamed, echogenic mesenteric fat in the right lower  quadrant. Adjacent bowel loops appear inflamed.        Impression    IMPRESSION:  1. Ill-defined fluid collection and inflammatory changes in the right  lower quadrant. Differential is broad and may include ruptured  appendicitis or inflammatory bowel disease. Recommend further  evaluation with contrast-enhanced CT of the abdomen or pelvis.  2. The right ovary is not visualized.      Numerous unsuccessful attempts were made to reach the ordering  provider. The patient was advised to present to the emergency  department. The emergency department was contacted by Dr. Coles to  notify them of the patient.    SHAWN COLES MD         SYSTEM ID:  [id number]     Medications   sodium chloride (PF) 0.9% PF flush 3 mL (has no administration in time range)   sodium chloride (PF) 0.9% PF flush 3 mL (0 mLs Intravenous Hold 6/23/22 8942)   ibuprofen (ADVIL/MOTRIN) tablet 600  mg (600 mg Oral Given 6/23/22 1620)   0.9% sodium chloride BOLUS (1,000 mLs Intravenous New Bag 6/23/22 1720)   iopamidol (ISOVUE-370) solution 100 mL (89 mLs Intravenous Given 6/23/22 1954)   sodium chloride 0.9 % bag 100mL for CT scan flush use (62 mLs Intravenous Given 6/23/22 1954)   piperacillin-tazobactam (ZOSYN) 4.5 g vial to attach to  mL bag (0 g Intravenous Stopped 6/23/22 2110)   diphenhydrAMINE (BENADRYL) capsule 25 mg (25 mg Oral Given 6/23/22 2121)        Assessments & Plan (with Medical Decision Making)   Patient presents to the emergency department today after being instructed to come here by radiology due to an ill-defined fluid collection in the right lower quadrant seen on ultrasound.  Patient has had ongoing abdominal pain for the past 2 to 3 weeks.  She is somewhat vague in her story and timing of symptoms but it has evidently been persistent and this is what prompted the ultrasound today.  Here in the emergency department the patient is febrile to 102.1.  She does have some tenderness to palpation in the right lower quadrant.  Certainly need to consider the possibility of ruptured appendicitis, abscess formation, colitis, amongst others.    We did establish IV access we did draw blood for laboratory analysis.  Patient was given IV fluids here in the emergency department.  CBC shows leukocytosis with a white count of 15.4 with a left shift., CMP is essentially within normal limits with normal electrolytes, glucose 112, albumin minimally low at 3.1, lactate 0.6, blood cultures are pending.  Serum hCG is negative . UA shows a small amount of blood, trace LE, with 1 red cell and 2 white cells.  This will be cultured, largely because of the next finding on CT.  Abdominal CT scan with contrast shows acute perforated appendicitis with an ill-defined 2.6 cm phlegmon in the right lower small bowel mesentery with surrounding reactive lymph nodes, no discrete appendix is visualized.  There is  significant reactive wall thickening at the cecal tip and adjacent terminal ileum, strongly suspicious for perforated appendicitis.  There is also a 4.2 cm complex cystic lesion in the right adnexa, presumably complex right ovarian cyst though discrete ovary is not visualized and pelvic abscess would definitely be a diagnostic consideration.  Additionally, there is an indeterminant 4.9 cm solid and cystic mass in the lower pole of the left kidney with multiple cystic spaces present.  Radiology feels that this is an abscess versus malignancy and follow-up is suggested.  There is also a 1.4 cm hypodense region in the lateral aspect of the upper pole of the right kidney which should be followed and is indeterminate.     Currently the patient's problem clearly is this ruptured appendicitis.  I did speak to Dr. Montana, surgery staff, about this.  He reports that at this point treatment will be nonoperative.  He is suggesting admission to medicine here at [facility].  Surgical consult can be obtained tomorrow here at [facility].  The patient will require IV antibiotics which I have ordered with Zosyn.  IR consult will need to be obtained tomorrow to assess for possible drainage.  I have added on an INR to her labs in preparation for this.  I will make her n.p.o. after midnight, also in preparation for possible interventional radiology procedure tomorrow.    Patient will need to be admitted to the medicine service here at [facility].  Currently awaiting callback.    With regard to the incidental findings in both kidneys, she is going to require follow-up for this as it is unclear if her lesions on CT represent cyst, abscess, or malignancy.  Urine culture is obtained due to concern for potential for renal abscess.    I have reviewed the nursing notes. I have reviewed the findings, diagnosis, plan and need for follow up with the patient.    New Prescriptions    No medications on file       Final diagnoses:    Perforated appendicitis   Renal lesion     I, Myesha Ch, am serving as a trained medical scribe to document services personally performed by Cheyenne Hernandez MD based on the provider's statements to me on June 23, 2022.  This document has been checked and approved by the attending provider.    I, Cheyenne Hernandez MD, was physically present and have reviewed and verified the accuracy of this note documented by Myesha Ch, medical scribe.      Cheyenne Hernandez MD     [facility] EMERGENCY DEPARTMENT  6/23/2022     Cheyenne Hernandez MD  06/23/22 3972

## 2022-06-23 NOTE — ED TRIAGE NOTES
"Pt was sent here from ultrasound stating they told her to come here d/t to \"finding something on her appendix and needs to come to ER to have a CT scan.\" pt casually states she has had pain for a couple of weeks and intermittent pain and now constant sharp pain.      Triage Assessment     Row Name 06/23/22 1666       Triage Assessment (Adult)    Airway WDL WDL       Respiratory WDL    Respiratory WDL WDL       Skin Circulation/Temperature WDL    Skin Circulation/Temperature WDL WDL       Cardiac WDL    Cardiac WDL WDL       Peripheral/Neurovascular WDL    Peripheral Neurovascular WDL WDL       Cognitive/Neuro/Behavioral WDL    Cognitive/Neuro/Behavioral WDL WDL              "

## 2022-06-24 LAB
ALBUMIN SERPL-MCNC: 2.8 G/DL (ref 3.4–5)
ALP SERPL-CCNC: 62 U/L (ref 40–150)
ALT SERPL W P-5'-P-CCNC: 14 U/L (ref 0–50)
ANION GAP SERPL CALCULATED.3IONS-SCNC: 8 MMOL/L (ref 3–14)
AST SERPL W P-5'-P-CCNC: 5 U/L (ref 0–45)
BILIRUB SERPL-MCNC: 0.3 MG/DL (ref 0.2–1.3)
BUN SERPL-MCNC: 13 MG/DL (ref 7–30)
CALCIUM SERPL-MCNC: 8.8 MG/DL (ref 8.5–10.1)
CHLORIDE BLD-SCNC: 109 MMOL/L (ref 94–109)
CO2 SERPL-SCNC: 24 MMOL/L (ref 20–32)
CREAT SERPL-MCNC: 0.56 MG/DL (ref 0.52–1.04)
CRP SERPL-MCNC: 220 MG/L (ref 0–8)
ERYTHROCYTE [DISTWIDTH] IN BLOOD BY AUTOMATED COUNT: 12.9 % (ref 10–15)
GFR SERPL CREATININE-BSD FRML MDRD: >90 ML/MIN/1.73M2
GLUCOSE BLD-MCNC: 118 MG/DL (ref 70–99)
HCT VFR BLD AUTO: 31.1 % (ref 35–47)
HGB BLD-MCNC: 10.2 G/DL (ref 11.7–15.7)
MCH RBC QN AUTO: 28.2 PG (ref 26.5–33)
MCHC RBC AUTO-ENTMCNC: 32.8 G/DL (ref 31.5–36.5)
MCV RBC AUTO: 86 FL (ref 78–100)
PLATELET # BLD AUTO: 299 10E3/UL (ref 150–450)
POTASSIUM BLD-SCNC: 3.8 MMOL/L (ref 3.4–5.3)
PROT SERPL-MCNC: 7.2 G/DL (ref 6.8–8.8)
RBC # BLD AUTO: 3.62 10E6/UL (ref 3.8–5.2)
SODIUM SERPL-SCNC: 141 MMOL/L (ref 133–144)
WBC # BLD AUTO: 7.5 10E3/UL (ref 4–11)

## 2022-06-24 PROCEDURE — 250N000011 HC RX IP 250 OP 636: Performed by: PEDIATRICS

## 2022-06-24 PROCEDURE — 99207 PR APP CREDIT; MD BILLING SHARED VISIT: CPT | Performed by: PHYSICIAN ASSISTANT

## 2022-06-24 PROCEDURE — 250N000013 HC RX MED GY IP 250 OP 250 PS 637: Performed by: PEDIATRICS

## 2022-06-24 PROCEDURE — 36415 COLL VENOUS BLD VENIPUNCTURE: CPT | Performed by: PEDIATRICS

## 2022-06-24 PROCEDURE — 85027 COMPLETE CBC AUTOMATED: CPT | Performed by: PEDIATRICS

## 2022-06-24 PROCEDURE — 96366 THER/PROPH/DIAG IV INF ADDON: CPT | Performed by: EMERGENCY MEDICINE

## 2022-06-24 PROCEDURE — 120N000002 HC R&B MED SURG/OB UMMC

## 2022-06-24 PROCEDURE — 258N000001 HC RX 258: Performed by: PEDIATRICS

## 2022-06-24 PROCEDURE — 96367 TX/PROPH/DG ADDL SEQ IV INF: CPT | Performed by: EMERGENCY MEDICINE

## 2022-06-24 PROCEDURE — 80053 COMPREHEN METABOLIC PANEL: CPT | Performed by: PEDIATRICS

## 2022-06-24 PROCEDURE — 96368 THER/DIAG CONCURRENT INF: CPT | Performed by: EMERGENCY MEDICINE

## 2022-06-24 PROCEDURE — 99232 SBSQ HOSP IP/OBS MODERATE 35: CPT | Performed by: STUDENT IN AN ORGANIZED HEALTH CARE EDUCATION/TRAINING PROGRAM

## 2022-06-24 RX ORDER — ONDANSETRON 4 MG/1
4 TABLET, ORALLY DISINTEGRATING ORAL EVERY 6 HOURS PRN
Status: DISCONTINUED | OUTPATIENT
Start: 2022-06-24 | End: 2022-06-26 | Stop reason: HOSPADM

## 2022-06-24 RX ORDER — PIPERACILLIN SODIUM, TAZOBACTAM SODIUM 3; .375 G/15ML; G/15ML
3.38 INJECTION, POWDER, LYOPHILIZED, FOR SOLUTION INTRAVENOUS EVERY 6 HOURS
Status: DISCONTINUED | OUTPATIENT
Start: 2022-06-24 | End: 2022-06-26

## 2022-06-24 RX ORDER — ONDANSETRON 2 MG/ML
4 INJECTION INTRAMUSCULAR; INTRAVENOUS EVERY 6 HOURS PRN
Status: DISCONTINUED | OUTPATIENT
Start: 2022-06-24 | End: 2022-06-26 | Stop reason: HOSPADM

## 2022-06-24 RX ORDER — HYDROMORPHONE HYDROCHLORIDE 2 MG/1
2 TABLET ORAL EVERY 4 HOURS PRN
Status: DISCONTINUED | OUTPATIENT
Start: 2022-06-24 | End: 2022-06-26 | Stop reason: HOSPADM

## 2022-06-24 RX ORDER — PROCHLORPERAZINE MALEATE 5 MG
10 TABLET ORAL EVERY 6 HOURS PRN
Status: DISCONTINUED | OUTPATIENT
Start: 2022-06-24 | End: 2022-06-26 | Stop reason: HOSPADM

## 2022-06-24 RX ORDER — PROCHLORPERAZINE 25 MG
25 SUPPOSITORY, RECTAL RECTAL EVERY 12 HOURS PRN
Status: DISCONTINUED | OUTPATIENT
Start: 2022-06-24 | End: 2022-06-26 | Stop reason: HOSPADM

## 2022-06-24 RX ORDER — LIDOCAINE 40 MG/G
CREAM TOPICAL
Status: DISCONTINUED | OUTPATIENT
Start: 2022-06-24 | End: 2022-06-26 | Stop reason: HOSPADM

## 2022-06-24 RX ORDER — ACETAMINOPHEN 650 MG/1
650 SUPPOSITORY RECTAL EVERY 6 HOURS PRN
Status: DISCONTINUED | OUTPATIENT
Start: 2022-06-24 | End: 2022-06-26 | Stop reason: HOSPADM

## 2022-06-24 RX ORDER — ACETAMINOPHEN 325 MG/1
650 TABLET ORAL EVERY 6 HOURS PRN
Status: DISCONTINUED | OUTPATIENT
Start: 2022-06-24 | End: 2022-06-26 | Stop reason: HOSPADM

## 2022-06-24 RX ORDER — DEXTROSE MONOHYDRATE, SODIUM CHLORIDE, AND POTASSIUM CHLORIDE 50; 1.49; 9 G/1000ML; G/1000ML; G/1000ML
100 INJECTION, SOLUTION INTRAVENOUS CONTINUOUS
Status: DISCONTINUED | OUTPATIENT
Start: 2022-06-24 | End: 2022-06-26

## 2022-06-24 RX ADMIN — PIPERACILLIN AND TAZOBACTAM 3.38 G: 3; .375 INJECTION, POWDER, LYOPHILIZED, FOR SOLUTION INTRAVENOUS at 15:21

## 2022-06-24 RX ADMIN — ACETAMINOPHEN 650 MG: 325 TABLET, FILM COATED ORAL at 22:36

## 2022-06-24 RX ADMIN — POTASSIUM CHLORIDE, DEXTROSE MONOHYDRATE AND SODIUM CHLORIDE 100 ML/HR: 150; 5; 900 INJECTION, SOLUTION INTRAVENOUS at 04:47

## 2022-06-24 RX ADMIN — PIPERACILLIN AND TAZOBACTAM 3.38 G: 3; .375 INJECTION, POWDER, LYOPHILIZED, FOR SOLUTION INTRAVENOUS at 22:36

## 2022-06-24 RX ADMIN — ACETAMINOPHEN 650 MG: 325 TABLET, FILM COATED ORAL at 11:39

## 2022-06-24 RX ADMIN — PIPERACILLIN AND TAZOBACTAM 3.38 G: 3; .375 INJECTION, POWDER, LYOPHILIZED, FOR SOLUTION INTRAVENOUS at 04:47

## 2022-06-24 RX ADMIN — PIPERACILLIN AND TAZOBACTAM 3.38 G: 3; .375 INJECTION, POWDER, LYOPHILIZED, FOR SOLUTION INTRAVENOUS at 10:19

## 2022-06-24 ASSESSMENT — ACTIVITIES OF DAILY LIVING (ADL)
ADLS_ACUITY_SCORE: 18
DIFFICULTY_EATING/SWALLOWING: NO
CHANGE_IN_FUNCTIONAL_STATUS_SINCE_ONSET_OF_CURRENT_ILLNESS/INJURY: NO
ADLS_ACUITY_SCORE: 35
ADLS_ACUITY_SCORE: 18
FALL_HISTORY_WITHIN_LAST_SIX_MONTHS: NO
TOILETING_ISSUES: NO
CONCENTRATING,_REMEMBERING_OR_MAKING_DECISIONS_DIFFICULTY: NO
ADLS_ACUITY_SCORE: 18
ADLS_ACUITY_SCORE: 35
WEAR_GLASSES_OR_BLIND: NO
ADLS_ACUITY_SCORE: 35
ADLS_ACUITY_SCORE: 35
DOING_ERRANDS_INDEPENDENTLY_DIFFICULTY: NO
WALKING_OR_CLIMBING_STAIRS_DIFFICULTY: NO
ADLS_ACUITY_SCORE: 35
ADLS_ACUITY_SCORE: 35
ADLS_ACUITY_SCORE: 18
HEARING_DIFFICULTY_OR_DEAF: NO
DRESSING/BATHING_DIFFICULTY: NO
ADLS_ACUITY_SCORE: 31
ADLS_ACUITY_SCORE: 35
DIFFICULTY_COMMUNICATING: NO

## 2022-06-24 NOTE — CONSULTS
EGS Surgery Consult  2022    Caroline Pride  : 1980    Date of Service: 2022 2:34 PM    Assessment and Plan:  Caroline Pride is a 41 year old otherwise healthy female presenting with > 2 weeks of RLW abdominal pain as well as fevers and leukocytosis, found to have perforated appendicitis with obliteration of the appendix as well as a right adnexal complex cyst and a right renal cyst. She is overall well-appearing. Primary team discussed with IR; no discrete abscess to drain. No prior abdominal surgeries.    - appreciate medicine cares  - recommend non-operative management of appendicitis  - advance diet as tolerated  - IV abx initiated. Transition to PO at discretion of medicine team  - trend CBC  - we will arrange follow up in surgery clinic to discuss interval appendectomy  - pt should also follow up with PCP to arrange outpatient colonoscopy prior to interval appendectomy    Discussed with chief resident who will discuss with Dr. Candido Jones MD  General Surgery PGY2    --------------------------------------------------------------------  History of Present Illness:    Caroline Pride is a 41 year old female that presented to urgent care yesterday with abdominal pain. She has been having RLQ pain for over 2 weeks, as well as intermittent fevers and chills. She denies nausea, has some decreased PO intake but has been tolerating diet, denies changes in bowel or bladder.    Pt denies any significant pmhx.  No prior surgeries    Past Medical History:  Past Medical History:   Diagnosis Date     Depressive disorder ongoing     PCOS (polycystic ovarian syndrome)      Subclinical hypothyroidism        Past Surgical History  No prior surgeries    Family History:  Family History   Problem Relation Age of Onset     Thyroid Disease Mother      Anemia Mother      Depression Father      Anxiety Disorder Father      Heart Disease Father      Hypertension Sister      Anxiety  "Disorder Sister      Thyroid Disease Sister      Breast Cancer Maternal Grandmother          at 42y     Parkinsonism Maternal Uncle      Hypertension Sister      Anxiety Disorder Sister      Thyroid Disease Sister        Social History:  Lives with her wife  No tobacco use  Occasional alcohol use    Medications:  No current outpatient medications on file.       Allergies:   No Known Allergies    Review of Symptoms:  A 10 point review of symptoms has been conducted and is negative except for that mentioned in the above HPI.    Physical Exam:    Blood pressure 120/73, pulse 83, temperature 97.8  F (36.6  C), temperature source Oral, resp. rate 16, height 1.721 m (5' 7.75\"), weight 85.3 kg (188 lb), last menstrual period 2022, SpO2 98 %, not currently breastfeeding.  Gen:    Lying in bed in NAD, A&OX3  HEENT: Normocephalic and atraumatic  CV:  RRR  Pulm:  Non-labored breathing on RA  Abd:  Soft, non-distended, minimally TTP in the RLQ without rebound or guarding. Non-tender elsewhere   Ext:  Warm and well perfused, no obvious deformities    Labs:  CBC RESULTS:   Recent Labs   Lab Test 22  0652   WBC 7.5   RBC 3.62*   HGB 10.2*   HCT 31.1*   MCV 86   MCH 28.2   MCHC 32.8   RDW 12.9        Last Basic Metabolic Panel:  Lab Results   Component Value Date     2022     2017      Lab Results   Component Value Date    POTASSIUM 3.8 2022    POTASSIUM 4.2 2017     Lab Results   Component Value Date    CHLORIDE 109 2022    CHLORIDE 108 2017     Lab Results   Component Value Date    LAURE 8.8 2022    LAURE 9.0 2017     Lab Results   Component Value Date    CO2 24 2022    CO2 24 2017     Lab Results   Component Value Date    BUN 13 2022    BUN 13 2017     Lab Results   Component Value Date    CR 0.56 2022    CR 0.48 2020     Lab Results   Component Value Date     2022     2017 "       Imaging:  EXAM: CT ABDOMEN PELVIS W CONTRAST  LOCATION: [facility]  DATE/TIME: 6/23/2022 6:13 PM     INDICATION: Three weeks of abdominal pain, ultrasound today showed ill-defined possible abscess in the right lower quadrant, evaluate for possible ruptured appendicitis.     COMPARISON: None.     TECHNIQUE: CT scan of the abdomen and pelvis was performed following injection of IV contrast. Multiplanar reformats were obtained. Dose reduction techniques were used.     CONTRAST: 89 mL Isovue 370.     FINDINGS:   LOWER CHEST: Normal.     HEPATOBILIARY: Normal.     PANCREAS: Normal.     SPLEEN: Normal.     ADRENAL GLANDS: Normal.     KIDNEYS/BLADDER: There is a 4.9 cm solid and cystic mass lower pole left kidney. Multiple cystic spaces are present. This is indeterminant. Abscess would be one consideration and short-term follow-up is recommended. There is a vague 1.4 cm hypodense   region in the lateral upper right kidney which is also indeterminant and warrants follow-up.     BOWEL: Large amount of inflammatory change in the right lower abdomen with significant reactive wall thickening cecal tip and adjacent terminal ileum. No defined appendix, and findings suggest perforated acute appendicitis. There is ill-defined phlegmon   in the right lower small bowel mesentery measuring 2.6 cm.     LYMPH NODES: Normal.     VASCULATURE: Unremarkable.     PELVIC ORGANS: Cystic region in the right adnexa likely ovarian cyst, although discrete right ovary not visualized. Cannot exclude pelvic abscess. follicle left ovary.     MUSCULOSKELETAL: Normal.                                                                      IMPRESSION:   1.  Acute perforated appendicitis with ill-defined 2.6 cm phlegmon in the right lower small bowel mesentery and surrounding reactive lymph nodes. Discrete appendix is not visualized. There is significant reactive wall thickening of the cecal tip and    adjacent terminal ileum.  2.  There is a 4.2 cm complex cystic region in the right adnexa presumably right complex ovarian cyst, although discrete ovary not visualized. Pelvic abscess would also be a diagnostic consideration.     3.  Indeterminate 4.9 cm solid and cystic mass lower pole left kidney. Multiple cystic spaces are present. Abscess versus malignancy. Follow-up suggested. There is also a vague 1.4 cm hypodense region lateral aspect upper pole right kidney which should   be followed and is indeterminate.

## 2022-06-24 NOTE — ED NOTES
Have contacted radiology. They have contacted Dr Alvarenga and interventional radiology will not be done at this time

## 2022-06-24 NOTE — PLAN OF CARE
"/73 (BP Location: Left arm)   Pulse 83   Temp 97.8  F (36.6  C) (Oral)   Resp 16   Ht 1.721 m (5' 7.75\")   Wt 85.3 kg (188 lb)   LMP 05/22/2022   SpO2 98%   BMI 28.80 kg/m    Admit from [place] ED @ 1300  Reason for admission: ruptured appendicitis with phlegmon   Neuro: AOx4  Pain: insignificant pain in RLQ  CMS: intact  Cardiac: WDL  Resp: O2>90 on RA  GI/: voiding adequately, LBM 6/23/2022  Skin/Wound: skin intact, refused 2 RN skin check  Access: PIV infusing IVMF  Labs: reviewed  Activity: Up ad rukhsana, independent at baseline  Nutrition: Reg  Changes this shift: admit from  ED, surgery consult, diet advanced to regular  Plan: No surgical intervention or drain placement necessary at this time, IV zosyn q6h will likely discharge home on oral abx with OP follow-up in 1-2 days    Pt was educated on risks of leaving unit including but not limited to a lapse in monitoring and the delay of medications and treatments which may be scheduled when pt is out of room.  Pt is aware of and accepts these risks.                       "

## 2022-06-24 NOTE — PROGRESS NOTES
[facility]  Transfer Triage Note    Date of call: 06/23/22  Time of call: 11:47 PM    Current Patient Location:  ED  Current Level of Care: ED    Vitals: Temp: 98.1  F (36.7  C) Temp src: Oral BP: 117/82 Pulse: 88   Resp: 16 SpO2: 97 %      O2 Device: None (Room air) at    Diagnosis: Perforated appendicits  Is COVID-19 a concern? No  Reason for requested transfer: Further diagnostic work up, management, and consultation for specialized care   Isolation Needs: None    Outside Records: Available  Additional records may be faxed to [phone].    Transfer accepted: Yes  Stability of Patient: Patient is at risk for instability, however patient requires urgent transfer and does not meet ICU criteria   Level of Care Needed: Med Surg  Telemetry Needed:  None  Expected Time of Arrival for Transfer: greater than 24 hours  Arrival Location:  [facility]    Recommendations for Management and Stabilization: Not needed    Additional Comments: 40yo F w/ abdominal pain for past weeks presented after concerning urgent care ultrasound and found in ED on CT to have perforated appendicitis w/ phlegmon and temp up to 102.. Staff surgeon Dr. Montana was reached by ED provider and this author regarding disposition: patient is not a current surgical candidate and recommend medicine admission with IR consult for potential drain placement with IV antibiotics. However, given lack of consulting surgical team on [facility], hospital medicine requests transfer to site with on-site surgery team. Informed patient placement --> no other sites have bed availability. Therefore added patient to [facility] transfer list. [facility] hospitalist will complete H&P and patient cares until [facility] bed is available, which per placement could be days.    Perry Longo MD

## 2022-06-24 NOTE — ED NOTES
Pt complained of itching while receiving zosyn, iv abx was almost completed, same stopped and MD notified. Benadryl ordered. No rash, hives noted, just fingernail marks on arms from pt scratching. Pt denies any SOB or difficulty breathing.

## 2022-06-24 NOTE — H&P
"[facility]    History and Physical - Hospitalist Service, GOLD TEAM        Date of Admission:  6/23/2022    Assessment & Plan      Caroline Pride is a 41 year old female admitted on 6/23/2022. She is admitted with ruptured appendicitis complicated by phlegmon.    # Ruptured appendicitis complicated by phlegmon  - Zosyn empirically  - Surgery consultation  - IR consultation for consideration of drain placement  - N.p.o.  - Cold packs, Tylenol, Dilaudid as needed  - Zofran as needed  - Trend CRP and CBC  - Follow-up blood culture    # Right adnexal cystic area, concerning for abscess versus cyst,  - Would review with interventional radiology and consideration for drainage if thought to be abscess    # Left renal lesion with solid and cystic components  - Would review CT tomorrow with radiology for consideration of abscess versus malignancy  - Would order any further appropriate evaluation based on the aforementioned discussion  - If concern for malignancy would consult urology     Diet: NPO per Anesthesia Guidelines for Procedure/Surgery Except for: Meds    DVT Prophylaxis: Pneumatic Compression Devices  Black Catheter: Not present  Central Lines: None  Cardiac Monitoring: None  Code Status: Full Code Full    Clinically Significant Risk Factors Present on Admission             # Hypoalbuminemia: Albumin = 3.1 g/dL (Ref range: 3.4 - 5.0 g/dL) on admission, will monitor as appropriate   # Coagulation Defect: INR = 1.27 (Ref range: 0.85 - 1.15) and/or PTT = N/A on admission, will monitor for bleeding      # Overweight: Estimated body mass index is 28.59 kg/m  as calculated from the following:    Height as of 6/22/22: 1.727 m (5' 8\").    Weight as of 6/22/22: 85.3 kg (188 lb).        Disposition Plan      Expected Discharge Date: 06/26/2022                The patient's care was discussed with the Bedside Nurse, Patient and IR Consultant.    Scottie Melgar, " MD  Hospitalist Service, [facility]  Securely message with the [employer] Web Console (learn more here)  Text page via [employer] Paging/Directory   Please see signed in provider for up to date coverage information      ______________________________________________________________________    Chief Complaint   Abdominal pain    History is obtained from the patient    History of Present Illness   Caroline Pride is a 41 year old female who presents to the ED with worsening abdominal pain and fever.  Patient notes that she has had abdominal pain for about 2 weeks.  When this initially presented the pain was severe and associated with nausea.  She did note some constipation after the initial onset of pain that seem to improve with bowel meds.  Over this time she is also had fever and chills.  She notes that at some point the pain was focused on her right lower abdomen.  Yesterday she went to urgent care and was seen and had an ultrasound ordered that was abnormal and this prompted her to come to the ED.  In the ED a CT demonstrated evidence of ruptured appendicitis with phlegmon.    Review of Systems    The 10 point Review of Systems is negative other than noted in the HPI or here.     Past Medical History    I have reviewed this patient's medical history and updated it with pertinent information if needed.   Past Medical History:   Diagnosis Date     Depressive disorder ongoing     PCOS (polycystic ovarian syndrome)      Subclinical hypothyroidism        Past Surgical History   I have reviewed this patient's surgical history and updated it with pertinent information if needed.  No past surgical history on file.    Social History   I have reviewed this patient's social history and updated it with pertinent information if needed.  Social History     Tobacco Use     Smoking status: Former Smoker     Packs/day: 0.00     Years: 0.00     Pack years: 0.00     Quit date:  2019     Years since quitting: 3.4     Smokeless tobacco: Never Used   Substance Use Topics     Alcohol use: Yes     Comment: occasional     Drug use: No       Family History   I have reviewed this patient's family history and updated it with pertinent information if needed.  Family History   Problem Relation Age of Onset     Thyroid Disease Mother      Anemia Mother      Depression Father      Anxiety Disorder Father      Heart Disease Father      Hypertension Sister      Anxiety Disorder Sister      Thyroid Disease Sister      Breast Cancer Maternal Grandmother          at 42y     Parkinsonism Maternal Uncle      Hypertension Sister      Anxiety Disorder Sister      Thyroid Disease Sister        Prior to Admission Medications   Prior to Admission Medications   Prescriptions Last Dose Informant Patient Reported? Taking?   Calcium Carbonate-Vitamin D (CALCIUM-VITAMIN D3 PO)   Yes No   Cyanocobalamin (VITAMIN B12 PO)   Yes No   Evening Primrose Oil 1000 MG CAPS   Yes No   Sig: Take 1,200 mg by mouth daily   IRON PO   Yes No   Omega-3 Fatty Acids (FISH OIL PO)   Yes No   Prenatal Vit-Fe Fumarate-FA (PRENATAL MULTIVITAMIN  PLUS IRON) 27-0.8 MG TABS   Yes No   Sig: Take 1 tablet by mouth daily   Pyridoxine HCl (VITAMIN B6 PO)   Yes No   docusate sodium (COLACE) 100 MG capsule   No No   Sig: Take 1 capsule (100 mg) by mouth 2 times daily as needed for constipation   Patient not taking: Reported on 2021   valACYclovir (VALTREX) 1000 mg tablet   No No   Sig: Take 2 tablets (2,000 mg) by mouth 2 times daily      Facility-Administered Medications: None     Allergies   No Known Allergies    Physical Exam   Vital Signs: Temp: 98.1  F (36.7  C) Temp src: Oral BP: 117/82 Pulse: 88   Resp: 16 SpO2: 97 % O2 Device: None (Room air)    Weight: 0 lbs 0 oz    General Appearance: Patient is lying in bed, no acute distress  Eyes: Pupils are equal and reactive to light, extraocular muscles with normal function, sclera are  clear  HEENT: Atraumatic, nares are clear, oropharynx without lesions  Respiratory: Patient is breathing comfortably, lungs are clear to auscultation bilaterally  Cardiovascular: Regular rate and rhythm, no murmurs, rubs or gallops  GI: Soft, nondistended, tender to palpation of the right lower quadrant.  There is no rebound tenderness and no significant peritoneal signs.  Lymph/Hematologic: No cervical or axillary lymphadenopathy  Genitourinary: Deferred  Skin: No rashes or bruises  Musculoskeletal: No joint swelling or redness  Neurologic: Alert and oriented, cranial nerves with normal function, normal tone, moving extremities equally  Psychiatric: Normal affect    Data   Data reviewed today: I reviewed all medications, new labs and imaging results over the last 24 hours. I personally reviewed the abdominal CT image(s) showing Right lower extremity phlegmon.    Recent Labs   Lab 06/23/22  2231 06/23/22  1710 06/23/22  1701 06/22/22  1216   WBC  --  15.4*  --  14.2*   HGB  --  11.3*  --  11.8   MCV  --  87  --  85   PLT  --  397  --  432   INR 1.27*  --   --   --    NA  --   --  134 135   POTASSIUM  --   --  4.4 4.3   CHLORIDE  --   --  103 103   CO2  --   --  22  --    BUN  --   --  13  --    CR  --   --  0.60  --    ANIONGAP  --   --  9  --    LAURE  --   --  8.8  --    GLC  --   --  112*  --    ALBUMIN  --   --  3.1*  --    PROTTOTAL  --   --  8.3  --    BILITOTAL  --   --  0.5  --    ALKPHOS  --   --  70  --    ALT  --   --  15  --    AST  --   --  10  --

## 2022-06-24 NOTE — CONSULTS
IR consulted for patient Caroline Pride for evaluation of possible drain placement in the setting of 2-3 weeks pelvic pain and CT consistent with with perforated appendicitis with phlegmon.     Patient's imaging was reviewed with IR staff Dr. Carmelo Lee. The area in question is phlegmous without a well formed abscess to target for a drain. There is no target for percutaneous drain placement at this time.     Case discussed with IR staff Dr. Lee and requesting provider Dr. Alvarenga.    Jim Denton PA-C  Interventional Radiology  Phone: [phone]  Pager: [phone]

## 2022-06-24 NOTE — PROGRESS NOTES
"[facility]    Medicine Progress Note - Hospitalist Service, GOLD TEAM 6    Date of Admission:  6/23/2022    Assessment & Plan          Caroline Pride is a 41 year old female with a past medical history of PCOS who presents with fevers and a 2 week history of worsening abdominal pain.  She was found to have a ruptured appendicitis. She is transferred to [facility] for general surgery evaluation.     Rupture Appendicitis c/b Phlegmon - Patient presents with a 2 week history of worsening abdominal pain with new fevers.  She was seen at an urgent care where she had an abnormal ultrasound. She presented to the ED with CT abdomen/pelvis c/f \"Acute perforated appendicitis with ill-defined 2.6 cm phlegmon in the right lower small bowel mesentery and surrounding reactive lymph nodes. Discrete appendix is not visualized. There is significant reactive wall thickening of the cecal tip and adjacent terminal ileum.\" She was started on prophylactic Zosyn and kept NPO pending surgical eval.  Interventional radiology reviewed imaging. As there is no walled off abscess, unable to place drain.  -General surgery following, appreciate recs and assistance  -Continue Zosyn 3.376mg Q6H   -Follow abdominal exam, CRP and WBC  -Clear liquid diet, advance as tolerated  -Hope to transition to oral antibiotics in next 1-2 days      Right Adnexal Cystic Area  Hx of PCOS - CT abdomen pelvis with incidental 4.2cm right adnexal cyst.   -CTM      Left Renal Lesion - CT abdomen/pelvis with \"Indeterminate 4.9 cm solid and cystic mass lower pole left kidney. Multiple cystic spaces are present. Abscess versus malignancy.  There is also a vague 1.4 cm hypodense region lateral aspect upper pole right kidney which should  be followed and is indeterminate.\" Renal function WNLs.   -Consider inpatient vs outpatient repeat imaging once inflammation from above appendicitis improves.          Diet: Advance " "Diet as Tolerated: Clear Liquid Diet    DVT Prophylaxis: Ambulate every shift  Black Catheter: Not present  Central Lines: None  Cardiac Monitoring: None  Code Status: Full Code      Disposition Plan      Expected Discharge Date: 06/26/2022                The patient's care was discussed with the Attending Physician, Dr. Paul Kearney.    Abbie Dowling PA-C  Hospitalist Service, GOLD TEAM [address] [place] [address]  Securely message with the [employer] Web Console (learn more here)  Text page via [facility] Paging/Directory   Please see signed in provider for up to date coverage information      Clinically Significant Risk Factors Present on Admission             # Hypoalbuminemia: Albumin = 2.8 g/dL (Ref range: 3.4 - 5.0 g/dL) on admission, will monitor as appropriate   # Coagulation Defect: INR = 1.27 (Ref range: 0.85 - 1.15) and/or PTT = N/A on admission, will monitor for bleeding      # Overweight: Estimated body mass index is 28.8 kg/m  as calculated from the following:    Height as of this encounter: 1.721 m (5' 7.75\").    Weight as of this encounter: 85.3 kg (188 lb).        ______________________________________________________________________    Interval History   All overnight events reviewed. Patient notes marked improvement in symptoms since starting antibiotics. Denies fevers, chills, nausea, vomiting.  Agreeable to care plan as above.     Data reviewed today: I reviewed all medications, new labs and imaging results over the last 24 hours.     Physical Exam   Vital Signs: Temp: 97.8  F (36.6  C) Temp src: Oral BP: 120/73 Pulse: 83   Resp: 16 SpO2: 98 % O2 Device: None (Room air)    Weight: 188 lbs 0 oz  GENERAL: Alert and oriented x 3. NAD. Ambulatory. Cooperative.   HEENT: Anicteric sclera. Mucous membranes moist. NC. AT.  CV: RRR. S1, S2. No murmurs appreciated.   RESPIRATORY: Effort normal on RA Lungs CTAB with no wheezing, rales, rhonchi.   GI: Abdomen soft and non distended " with normoactive bowel sounds present in all quadrants. Mild umbilical tenderness to palpation, No rebound, guarding. No lesions.   NEUROLOGICAL: No focal deficits. Moves all extremities.  CN 2-12 grossly intact.  EXTREMITIES: No peripheral edema. Intact bilateral pedal pulses.   SKIN: No jaundice. No rashes.      Data   Recent Labs   Lab 06/24/22  0652 06/23/22  2231 06/23/22  1710 06/23/22  1701 06/22/22  1216   WBC 7.5  --  15.4*  --  14.2*   HGB 10.2*  --  11.3*  --  11.8   MCV 86  --  87  --  85     --  397  --  432   INR  --  1.27*  --   --   --      --   --  134 135   POTASSIUM 3.8  --   --  4.4 4.3   CHLORIDE 109  --   --  103 103   CO2 24  --   --  22  --    BUN 13  --   --  13  --    CR 0.56  --   --  0.60  --    ANIONGAP 8  --   --  9  --    LAURE 8.8  --   --  8.8  --    *  --   --  112*  --    ALBUMIN 2.8*  --   --  3.1*  --    PROTTOTAL 7.2  --   --  8.3  --    BILITOTAL 0.3  --   --  0.5  --    ALKPHOS 62  --   --  70  --    ALT 14  --   --  15  --    AST 5  --   --  10  --

## 2022-06-25 LAB
ALBUMIN SERPL BCG-MCNC: 3.6 G/DL (ref 3.5–5.2)
ALP SERPL-CCNC: 51 U/L (ref 35–104)
ALT SERPL W P-5'-P-CCNC: 11 U/L (ref 10–35)
ANION GAP SERPL CALCULATED.3IONS-SCNC: 7 MMOL/L (ref 7–15)
AST SERPL W P-5'-P-CCNC: 9 U/L (ref 10–35)
BACTERIA UR CULT: NORMAL
BILIRUB SERPL-MCNC: 0.2 MG/DL
BUN SERPL-MCNC: 11.2 MG/DL (ref 6–20)
CALCIUM SERPL-MCNC: 8.5 MG/DL (ref 8.6–10)
CHLORIDE SERPL-SCNC: 107 MMOL/L (ref 98–107)
CREAT SERPL-MCNC: 0.65 MG/DL (ref 0.51–0.95)
CRP SERPL-MCNC: 79.6 MG/L
DEPRECATED HCO3 PLAS-SCNC: 24 MMOL/L (ref 22–29)
ERYTHROCYTE [DISTWIDTH] IN BLOOD BY AUTOMATED COUNT: 12.9 % (ref 10–15)
GFR SERPL CREATININE-BSD FRML MDRD: >90 ML/MIN/1.73M2
GLUCOSE SERPL-MCNC: 118 MG/DL (ref 70–99)
HCT VFR BLD AUTO: 30.8 % (ref 35–47)
HGB BLD-MCNC: 10.1 G/DL (ref 11.7–15.7)
MCH RBC QN AUTO: 28.9 PG (ref 26.5–33)
MCHC RBC AUTO-ENTMCNC: 32.8 G/DL (ref 31.5–36.5)
MCV RBC AUTO: 88 FL (ref 78–100)
PLATELET # BLD AUTO: 354 10E3/UL (ref 150–450)
POTASSIUM SERPL-SCNC: 4.3 MMOL/L (ref 3.4–5.3)
PROT SERPL-MCNC: 6.4 G/DL (ref 6.4–8.3)
RBC # BLD AUTO: 3.5 10E6/UL (ref 3.8–5.2)
SODIUM SERPL-SCNC: 138 MMOL/L (ref 136–145)
WBC # BLD AUTO: 5.4 10E3/UL (ref 4–11)

## 2022-06-25 PROCEDURE — 99207 PR APP CREDIT; MD BILLING SHARED VISIT: CPT | Performed by: PHYSICIAN ASSISTANT

## 2022-06-25 PROCEDURE — 99232 SBSQ HOSP IP/OBS MODERATE 35: CPT | Performed by: STUDENT IN AN ORGANIZED HEALTH CARE EDUCATION/TRAINING PROGRAM

## 2022-06-25 PROCEDURE — 258N000001 HC RX 258: Performed by: PEDIATRICS

## 2022-06-25 PROCEDURE — 250N000011 HC RX IP 250 OP 636: Performed by: PEDIATRICS

## 2022-06-25 PROCEDURE — 80053 COMPREHEN METABOLIC PANEL: CPT | Performed by: PHYSICIAN ASSISTANT

## 2022-06-25 PROCEDURE — 85014 HEMATOCRIT: CPT | Performed by: PHYSICIAN ASSISTANT

## 2022-06-25 PROCEDURE — 82040 ASSAY OF SERUM ALBUMIN: CPT | Performed by: PHYSICIAN ASSISTANT

## 2022-06-25 PROCEDURE — 250N000013 HC RX MED GY IP 250 OP 250 PS 637: Performed by: PEDIATRICS

## 2022-06-25 PROCEDURE — 36415 COLL VENOUS BLD VENIPUNCTURE: CPT | Performed by: PHYSICIAN ASSISTANT

## 2022-06-25 PROCEDURE — 120N000002 HC R&B MED SURG/OB UMMC

## 2022-06-25 PROCEDURE — 86140 C-REACTIVE PROTEIN: CPT | Performed by: PHYSICIAN ASSISTANT

## 2022-06-25 RX ADMIN — PIPERACILLIN AND TAZOBACTAM 3.38 G: 3; .375 INJECTION, POWDER, LYOPHILIZED, FOR SOLUTION INTRAVENOUS at 16:09

## 2022-06-25 RX ADMIN — PIPERACILLIN AND TAZOBACTAM 3.38 G: 3; .375 INJECTION, POWDER, LYOPHILIZED, FOR SOLUTION INTRAVENOUS at 10:55

## 2022-06-25 RX ADMIN — POTASSIUM CHLORIDE, DEXTROSE MONOHYDRATE AND SODIUM CHLORIDE 100 ML/HR: 150; 5; 900 INJECTION, SOLUTION INTRAVENOUS at 20:45

## 2022-06-25 RX ADMIN — POTASSIUM CHLORIDE, DEXTROSE MONOHYDRATE AND SODIUM CHLORIDE 100 ML/HR: 150; 5; 900 INJECTION, SOLUTION INTRAVENOUS at 06:37

## 2022-06-25 RX ADMIN — PIPERACILLIN AND TAZOBACTAM 3.38 G: 3; .375 INJECTION, POWDER, LYOPHILIZED, FOR SOLUTION INTRAVENOUS at 22:03

## 2022-06-25 RX ADMIN — PIPERACILLIN AND TAZOBACTAM 3.38 G: 3; .375 INJECTION, POWDER, LYOPHILIZED, FOR SOLUTION INTRAVENOUS at 04:58

## 2022-06-25 RX ADMIN — ACETAMINOPHEN 650 MG: 325 TABLET, FILM COATED ORAL at 14:23

## 2022-06-25 ASSESSMENT — ACTIVITIES OF DAILY LIVING (ADL)
ADLS_ACUITY_SCORE: 18

## 2022-06-25 NOTE — PLAN OF CARE
Vitals:    06/24/22 1331 06/24/22 1551 06/25/22 0730 06/25/22 1252   BP: 120/73 108/72 112/74 127/79   BP Location: Left arm Right arm Right arm Right arm   Pulse: 83 80 72 75   Resp: 16 15 17 18   Temp: 97.8  F (36.6  C) 97.5  F (36.4  C) 97  F (36.1  C) 97.8  F (36.6  C)   TempSrc: Oral Axillary Oral Oral   SpO2: 98% 99% 96% 98%   Weight:       Height:           Neuro: alert and oriented, sating 98% on room air, lungs clear,     VS:afebrile vital stable, none labor breathing, +BS     Cardiac: HR 75    Pain/Nausea: denies any nausea, denies any pain,     Lines/Drains: PIV D 5% and 0.9% NaCl + KCI 20 mEq running  at 100 ml /hr     GI/: voiding not saved,     Diet/Appetite: tolerating regular intake     Activity: up ambulated independently .    Plan: continue with plan of care.

## 2022-06-25 NOTE — PROGRESS NOTES
"{MEDICINE AND P  [facility]    Medicine Progress Note - Hospitalist Service, GOLD TEAM 6    Date of Admission:  6/23/2022    Assessment & Plan          Caroline Pride is a 41 year old female with a past medical history of PCOS who presents with fevers and a 2 week history of worsening abdominal pain.  She was found to have a ruptured appendicitis. She is transferred to [facility] for general surgery evaluation.     Interval History:   -Continue Zosyn   -Plans to transition to oral antibiotics tomorrow if blood cultures remain negative   -Tentative plans to discharge tomorrow       Rupture Appendicitis c/b Phlegmon - Patient presents with a 2 week history of worsening abdominal pain with new fevers.  She was seen at an urgent care where she had an abnormal ultrasound. She presented to the ED with CT abdomen/pelvis c/f \"Acute perforated appendicitis with ill-defined 2.6 cm phlegmon in the right lower small bowel mesentery and surrounding reactive lymph nodes. Discrete appendix is not visualized. There is significant reactive wall thickening of the cecal tip and adjacent terminal ileum.\" She was started on prophylactic Zosyn and kept NPO pending surgical eval.  Interventional radiology reviewed imaging. As there is no walled off abscess, unable to place drain.  -General surgery following, appreciate recs and assistance  -Continue Zosyn 3.376mg Q6H   -Follow abdominal exam, CRP and WBC  -Regular adult diet     Right Adnexal Cystic Area  Hx of PCOS - CT abdomen pelvis with incidental 4.2cm right adnexal cyst.   -CTM      Left Renal Lesion - CT abdomen/pelvis with \"Indeterminate 4.9 cm solid and cystic mass lower pole left kidney. Multiple cystic spaces are present. Abscess versus malignancy.  There is also a vague 1.4 cm hypodense region lateral aspect upper pole right kidney which should  be followed and is indeterminate.\" Renal function WNLs.   -Consider inpatient " "vs outpatient repeat imaging once inflammation from above appendicitis improves.          Diet: Advance Diet as Tolerated: Regular Diet Adult    DVT Prophylaxis: Ambulate every shift  Black Catheter: Not present  Central Lines: None  Cardiac Monitoring: None  Code Status: Full Code      Disposition Plan     Expected Discharge Date: 06/26/2022      Destination: home          The patient's care was discussed with the Attending Physician, Dr. Paul Kearney.    Abbie Dowling PA-C  Hospitalist Service, [address]  Securely message with the Vocera Web Console (learn more here)  Text page via [facility] Paging/Directory   Please see signed in provider for up to date coverage information      Clinically Significant Risk Factors Present on Admission                # Overweight: Estimated body mass index is 28.8 kg/m  as calculated from the following:    Height as of this encounter: 1.721 m (5' 7.75\").    Weight as of this encounter: 85.3 kg (188 lb).        ______________________________________________________________________    Interval History   All overnight events reviewed. Patient notes improvement in abdominal pain, tolerating an oral diet.  Discussed plans for discharge should blood cultures remain negative.  Notes ongoing constipation that has been present for several weeks.     Data reviewed today: I reviewed all medications, new labs and imaging results over the last 24 hours.     Physical Exam   Vital Signs: Temp: 97  F (36.1  C) Temp src: Oral BP: 112/74 Pulse: 72   Resp: 17 SpO2: 96 % O2 Device: None (Room air)    Weight: 188 lbs 0 oz  GENERAL: Alert and oriented x 3. NAD. Ambulatory. Cooperative.   HEENT: Anicteric sclera. Mucous membranes moist. NC. AT.  CV: RRR. S1, S2. No murmurs appreciated.   RESPIRATORY: Effort normal on RA Lungs CTAB with no wheezing, rales, rhonchi.   GI: Abdomen soft and non distended with normoactive bowel sounds present in all " quadrants. Mild umbilical tenderness to palpation, No rebound, guarding. No lesions.   NEUROLOGICAL: No focal deficits. Moves all extremities.  CN 2-12 grossly intact.  EXTREMITIES: No peripheral edema. Intact bilateral pedal pulses.   SKIN: No jaundice. No rashes.

## 2022-06-25 NOTE — PLAN OF CARE
"/72 (BP Location: Right arm)   Pulse 80   Temp 97.5  F (36.4  C) (Axillary)   Resp 15   Ht 1.721 m (5' 7.75\")   Wt 85.3 kg (188 lb)   LMP 05/22/2022   SpO2 99%   BMI 28.80 kg/m      Status: Perforated appendix  Activity: Up ad rukhsana  Neuros: A&Ox4, no deficits noted.  Cardiac: WDL, denies chest pain.  Respiratory: WDL on RA, denies SOB.  GI/: +BS, LBM 6/23, voids spont/not saving.  Diet: Tolerating regular diet.  Skin/Incisions: WDL.  Lines/Drains: R PIV running D5+NS+K at 100 + abx.  Pain/Nausea: Headache managed with PRN tylenol; denies nausea.  New Changes: No acute changes this shift.    "

## 2022-06-25 NOTE — PLAN OF CARE
Vitals:    06/24/22 1331 06/24/22 1551 06/25/22 0730 06/25/22 1252   BP: 120/73 108/72 112/74 127/79   BP Location: Left arm Right arm Right arm Right arm   Pulse: 83 80 72 75   Resp: 16 15 17 18   Temp: 97.8  F (36.6  C) 97.5  F (36.4  C) 97  F (36.1  C) 97.8  F (36.6  C)   TempSrc: Oral Axillary Oral Oral   SpO2: 98% 99% 96% 98%   Weight:       Height:           Neuro: alert and oriented,     VS: afebrile vital stable sating 98%

## 2022-06-26 VITALS
WEIGHT: 188 LBS | HEIGHT: 68 IN | HEART RATE: 68 BPM | DIASTOLIC BLOOD PRESSURE: 86 MMHG | SYSTOLIC BLOOD PRESSURE: 124 MMHG | BODY MASS INDEX: 28.49 KG/M2 | RESPIRATION RATE: 16 BRPM | TEMPERATURE: 96.9 F | OXYGEN SATURATION: 98 %

## 2022-06-26 LAB
ANION GAP SERPL CALCULATED.3IONS-SCNC: 7 MMOL/L (ref 7–15)
BUN SERPL-MCNC: 9.5 MG/DL (ref 6–20)
CALCIUM SERPL-MCNC: 8.5 MG/DL (ref 8.6–10)
CHLORIDE SERPL-SCNC: 106 MMOL/L (ref 98–107)
CREAT SERPL-MCNC: 0.65 MG/DL (ref 0.51–0.95)
CRP SERPL-MCNC: 38.3 MG/L
DEPRECATED HCO3 PLAS-SCNC: 24 MMOL/L (ref 22–29)
ERYTHROCYTE [DISTWIDTH] IN BLOOD BY AUTOMATED COUNT: 12.5 % (ref 10–15)
GFR SERPL CREATININE-BSD FRML MDRD: >90 ML/MIN/1.73M2
GLUCOSE SERPL-MCNC: 114 MG/DL (ref 70–99)
HCT VFR BLD AUTO: 33.1 % (ref 35–47)
HGB BLD-MCNC: 10.5 G/DL (ref 11.7–15.7)
MAGNESIUM SERPL-MCNC: 2.2 MG/DL (ref 1.6–2.3)
MCH RBC QN AUTO: 28.2 PG (ref 26.5–33)
MCHC RBC AUTO-ENTMCNC: 31.7 G/DL (ref 31.5–36.5)
MCV RBC AUTO: 89 FL (ref 78–100)
PLATELET # BLD AUTO: 434 10E3/UL (ref 150–450)
POTASSIUM SERPL-SCNC: 4.3 MMOL/L (ref 3.4–5.3)
RBC # BLD AUTO: 3.73 10E6/UL (ref 3.8–5.2)
SODIUM SERPL-SCNC: 137 MMOL/L (ref 136–145)
TSH SERPL DL<=0.005 MIU/L-ACNC: 1.73 MU/L (ref 0.4–4)
WBC # BLD AUTO: 5.8 10E3/UL (ref 4–11)

## 2022-06-26 PROCEDURE — 85027 COMPLETE CBC AUTOMATED: CPT | Performed by: PHYSICIAN ASSISTANT

## 2022-06-26 PROCEDURE — 250N000011 HC RX IP 250 OP 636: Performed by: PEDIATRICS

## 2022-06-26 PROCEDURE — 86140 C-REACTIVE PROTEIN: CPT | Performed by: PHYSICIAN ASSISTANT

## 2022-06-26 PROCEDURE — 99207 PR APP CREDIT; MD BILLING SHARED VISIT: CPT | Performed by: PHYSICIAN ASSISTANT

## 2022-06-26 PROCEDURE — 250N000013 HC RX MED GY IP 250 OP 250 PS 637: Performed by: PHYSICIAN ASSISTANT

## 2022-06-26 PROCEDURE — 36415 COLL VENOUS BLD VENIPUNCTURE: CPT | Performed by: PHYSICIAN ASSISTANT

## 2022-06-26 PROCEDURE — 250N000013 HC RX MED GY IP 250 OP 250 PS 637: Performed by: PEDIATRICS

## 2022-06-26 PROCEDURE — 99239 HOSP IP/OBS DSCHRG MGMT >30: CPT | Performed by: STUDENT IN AN ORGANIZED HEALTH CARE EDUCATION/TRAINING PROGRAM

## 2022-06-26 PROCEDURE — 80048 BASIC METABOLIC PNL TOTAL CA: CPT | Performed by: PHYSICIAN ASSISTANT

## 2022-06-26 RX ORDER — CIPROFLOXACIN 500 MG/1
500 TABLET, FILM COATED ORAL EVERY 12 HOURS SCHEDULED
Status: DISCONTINUED | OUTPATIENT
Start: 2022-06-26 | End: 2022-06-26 | Stop reason: HOSPADM

## 2022-06-26 RX ORDER — METRONIDAZOLE 500 MG/1
500 TABLET ORAL 3 TIMES DAILY
Qty: 21 TABLET | Refills: 0 | Status: SHIPPED | OUTPATIENT
Start: 2022-06-26 | End: 2022-07-03

## 2022-06-26 RX ORDER — METRONIDAZOLE 500 MG/1
500 TABLET ORAL 3 TIMES DAILY
Status: DISCONTINUED | OUTPATIENT
Start: 2022-06-26 | End: 2022-06-26 | Stop reason: HOSPADM

## 2022-06-26 RX ORDER — CIPROFLOXACIN 500 MG/1
500 TABLET, FILM COATED ORAL EVERY 12 HOURS
Qty: 14 TABLET | Refills: 0 | Status: SHIPPED | OUTPATIENT
Start: 2022-06-26 | End: 2022-07-03

## 2022-06-26 RX ADMIN — PIPERACILLIN AND TAZOBACTAM 3.38 G: 3; .375 INJECTION, POWDER, LYOPHILIZED, FOR SOLUTION INTRAVENOUS at 04:47

## 2022-06-26 RX ADMIN — ACETAMINOPHEN 650 MG: 325 TABLET, FILM COATED ORAL at 07:22

## 2022-06-26 RX ADMIN — CIPROFLOXACIN 500 MG: 500 TABLET, FILM COATED ORAL at 09:13

## 2022-06-26 RX ADMIN — METRONIDAZOLE 500 MG: 500 TABLET ORAL at 09:13

## 2022-06-26 ASSESSMENT — ACTIVITIES OF DAILY LIVING (ADL)
ADLS_ACUITY_SCORE: 18

## 2022-06-26 NOTE — PROGRESS NOTES
The patient was given discharge education and verbalized understanding of the teaching. Questions were encouraged and answered. The patient was instructed to  discharge medications at the discharge pharmacy. The patient discharged in stable condition at 12:45 pm.

## 2022-06-26 NOTE — PLAN OF CARE
"/77 (BP Location: Right arm)   Pulse 67   Temp 98  F (36.7  C) (Oral)   Resp 16   Ht 1.721 m (5' 7.75\")   Wt 85.3 kg (188 lb)   LMP 05/22/2022   SpO2 98%   BMI 28.80 kg/m      Status: Perforated appendix  Activity: Up ad rukhsana  Neuros: A&Ox4, no deficits noted.  Cardiac: WDL, denies chest pain.  Respiratory: WDL on RA, denies SOB.  GI/: +BS, LBM 6/25, voids spont/not saving.  Diet: Tolerating regular diet.  Skin/Incisions: WDL.  Lines/Drains: R PIV running D5+NS+K at 100 + abx.  Pain/Nausea: Denies.  New Changes: No acute changes this shift.  Plan: Likely discharge home today when switched to PO abx.     "

## 2022-06-26 NOTE — DISCHARGE SUMMARY
[facility]  Hospitalist Discharge Summary      Date of Admission:  6/23/2022  Date of Discharge:  6/26/2022  Discharging Provider: Abbie Dowling PA-C  Discharge Service: Hospitalist Service, GOLD TEAM 6    Discharge Diagnoses     Ruptured Appendicitis c/b Phlegmon  Right Adnexal Cystic Area  Hx of PCOS  Left Renal Lesion      Follow-ups Needed After Discharge   Follow-up Appointments     Adult [facility] Follow-up and recommended labs and tests      Follow up with primary care provider, Giovanna Mistry, within 7 days   for hospital follow- up.   Follow up with General Surgery in 4-6 weeks for monitoring of your   ruptured appendix     Appointments on [place] and/or [facility] (with [facility] or [facility]   provider or service). Call [phone] if you haven't heard regarding   these appointments within 7 days of discharge.         Follow Up and recommended labs and tests      Please obtain a CT Abdomen Pelvis in 4-6 weeks (before your general   surgery appointment)             Unresulted Labs Ordered in the Past 30 Days of this Admission     Date and Time Order Name Status Description    6/23/2022  5:21 PM Blood Culture Peripheral Blood Preliminary     6/23/2022  5:21 PM Blood Culture Peripheral Blood Preliminary     6/22/2022 12:16 PM MAGNESIUM In process     6/22/2022 12:16 PM COMPREHENSIVE METABOLIC PANEL Preliminary     6/22/2022 12:16 PM TSH WITH FREE T4 REFLEX In process       These results will be followed up by PCP    Discharge Disposition   Discharged to home  Condition at discharge: Stable      Hospital Course   Caroline Pride is a 41 year old female with a past medical history of PCOS who presents with fevers and a 2 week history of worsening abdominal pain.  She was found to have a ruptured appendicitis with phlegmon with incidental findings of right adnexal cyst and left renal lesion. She was evaluated by general surgery and interventional radiology.   As there was no walled off collection, IR unable to place any drain.  General Surgery recommended conservative management with IV antibiotics. Patient's symptoms improved and her diet was advance.  She had resolution of her leukocytosis and her CRP is downtrending. Blood cultures remained negative.  She was transitioned to oral ciprofloxacin and flagyl to complete a 10 day course of antibiotics. A referral was placed for general surgery follow up in 4-6 weeks for future surgical planning.  Patient will need a repeat CT abdomen pelvis to evaluate her appendix, as well as the possible lesion on her left kidney.      Consultations This Hospital Stay   SURGERY GENERAL ADULT IP CONSULT  INTERVENTIONAL RADIOLOGY ADULT/PEDS IP CONSULT    Code Status   Full Code    Time Spent on this Encounter   I, Abbie Dowling PA-C, personally saw the patient today and spent greater than 30 minutes discharging this patient.       Abbie Dowling PA-C  [facility] [address] [address]  Phone: [phone]  ______________________________________________________________________    Physical Exam   Vital Signs: Temp: 96.9  F (36.1  C) Temp src: Oral BP: 124/86 Pulse: 68   Resp: 16 SpO2: 98 % O2 Device: None (Room air)    Weight: 188 lbs 0 oz  GENERAL: Alert and oriented x 3. NAD. Ambulatory. Cooperative.   HEENT: Anicteric sclera. Mucous membranes moist. NC. AT.   CV: RRR. S1, S2. No murmurs appreciated.   RESPIRATORY: Effort normal on RA. Lungs CTAB with no wheezing, rales, rhonchi.   GI: Abdomen soft and non distended with normoactive bowel sounds present in all quadrants. No tenderness, rebound, guarding. No lesions.   NEUROLOGICAL: No focal deficits. Moves all extremities.  CN 2-12 grossly intact.  EXTREMITIES: No peripheral edema. Intact bilateral pedal pulses.   SKIN: No jaundice. No rashes.           Primary Care Physician   Giovanna Mistry    Discharge Orders      CT Abdomen pelvis w &  w/o contrast     General Surg Adult Referral      Reason for your hospital stay    You were admitted after several weeks of abdominal pain. You were found to have a ruptured appendicitis.  General Surgery and Interventional Radiology were consulted.  Interventional Radiology were unable to place a drain as there was no walled off abscess. You were started on IV antibiotics. You had improvement in your symptoms and you were able to tolerate a diet.  You were discharged home on oral antibiotics with plans for repeat imaging and General Surgery follow up.     Activity    Your activity upon discharge: activity as tolerated     Adult [facility] Follow-up and recommended labs and tests    Follow up with primary care provider, Giovanna Mistry, within 7 days for hospital follow- up.   Follow up with General Surgery in 4-6 weeks for monitoring of your ruptured appendix     Appointments on [place] and/or [facility] (with [facility] or [facility] provider or service). Call [phone] if you haven't heard regarding these appointments within 7 days of discharge.     Follow Up and recommended labs and tests    Please obtain a CT Abdomen Pelvis in 4-6 weeks (before your general surgery appointment)     When to contact your care team    Call your primary doctor if you have any of the following: fever >100.4F, persistent nausea and vomiting, new or worsening abdominal pain     Discharge Instructions    Please take your oral antibiotics to complete a 10 day course, finishing July 3rd.  Do not drink alcohol while taking them. Obtain a CT abdomen pelvis prior to your general surgery follow up visit. This is to evaluate your ruptured appendix and also to monitor the incidental findings of a cyst on your left kidney. It is very important that you obtain this repeat imaging as surgery cannot make a possible surgical plan without it.     Diet    Follow this diet upon discharge: Orders Placed This Encounter      Advance Diet as Tolerated:  Regular Diet Adult       Significant Results and Procedures   1.  Acute perforated appendicitis with ill-defined 2.6 cm phlegmon in the right lower small bowel mesentery and surrounding reactive lymph nodes. Discrete appendix is not visualized. There is significant reactive wall thickening of the cecal tip and adjacent terminal ileum.  2.  There is a 4.2 cm complex cystic region in the right adnexa presumably right complex ovarian cyst, although discrete ovary not visualized. Pelvic abscess would also be a diagnostic consideration.  3.  Indeterminate 4.9 cm solid and cystic mass lower pole left kidney. Multiple cystic spaces are present. Abscess versus malignancy. Follow-up suggested. There is also a vague 1.4 cm hypodense region lateral aspect upper pole right kidney which should be followed and is indeterminate.     Discharge Medications   Current Discharge Medication List      START taking these medications    Details   ciprofloxacin (CIPRO) 500 MG tablet Take 1 tablet (500 mg) by mouth every 12 hours for 7 days  Qty: 14 tablet, Refills: 0    Associated Diagnoses: Perforated appendicitis      metroNIDAZOLE (FLAGYL) 500 MG tablet Take 1 tablet (500 mg) by mouth 3 times daily for 7 days  Qty: 21 tablet, Refills: 0    Associated Diagnoses: Perforated appendicitis         CONTINUE these medications which have NOT CHANGED    Details   Calcium Carbonate-Vitamin D (CALCIUM-VITAMIN D3 PO)       Cyanocobalamin (VITAMIN B12 PO)       docusate sodium (COLACE) 100 MG capsule Take 1 capsule (100 mg) by mouth 2 times daily as needed for constipation  Qty: 60 capsule, Refills: 0    Associated Diagnoses: Labor and delivery, indication for care      Evening Primrose Oil 1000 MG CAPS Take 1,200 mg by mouth daily      IRON PO       Omega-3 Fatty Acids (FISH OIL PO)       Prenatal Vit-Fe Fumarate-FA (PRENATAL MULTIVITAMIN  PLUS IRON) 27-0.8 MG TABS Take 1 tablet by mouth daily      Pyridoxine HCl (VITAMIN B6 PO)       valACYclovir  (VALTREX) 1000 mg tablet Take 2 tablets (2,000 mg) by mouth 2 times daily  Qty: 12 tablet, Refills: 6    Associated Diagnoses: Cold sore           Allergies   No Known Allergies

## 2022-06-27 ENCOUNTER — TELEPHONE (OUTPATIENT)
Dept: DERMATOLOGY | Facility: CLINIC | Age: 42
End: 2022-06-27

## 2022-06-27 ENCOUNTER — PATIENT OUTREACH (OUTPATIENT)
Dept: SURGERY | Facility: CLINIC | Age: 42
End: 2022-06-27

## 2022-06-27 ENCOUNTER — VIRTUAL VISIT (OUTPATIENT)
Dept: FAMILY MEDICINE | Facility: CLINIC | Age: 42
End: 2022-06-27
Payer: COMMERCIAL

## 2022-06-27 DIAGNOSIS — S70.361A TICK BITE OF RIGHT THIGH, INITIAL ENCOUNTER: ICD-10-CM

## 2022-06-27 DIAGNOSIS — L98.9 SKIN LESION: ICD-10-CM

## 2022-06-27 DIAGNOSIS — S70.361A TICK BITE OF RIGHT THIGH WITH LOCAL REACTION, INITIAL ENCOUNTER: Primary | ICD-10-CM

## 2022-06-27 DIAGNOSIS — R21 RASH: ICD-10-CM

## 2022-06-27 DIAGNOSIS — K35.32 RUPTURED APPENDICITIS: ICD-10-CM

## 2022-06-27 DIAGNOSIS — D22.9 CHANGE IN MOLE: ICD-10-CM
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
## 2022-06-27 DIAGNOSIS — Z09 HOSPITAL DISCHARGE FOLLOW-UP: Primary | ICD-10-CM

## 2022-06-27 DIAGNOSIS — D64.9 ANEMIA, UNSPECIFIED TYPE: ICD-10-CM
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
## 2022-06-27 DIAGNOSIS — B00.1 COLD SORE: ICD-10-CM

## 2022-06-27 LAB
ALBUMIN SERPL-MCNC: 3.4 G/DL (ref 3.4–5)
ALP SERPL-CCNC: 72 U/L (ref 40–150)
ALT SERPL W P-5'-P-CCNC: 12 U/L (ref 0–50)
ANION GAP SERPL CALCULATED.3IONS-SCNC: 8 MMOL/L (ref 3–14)
AST SERPL W P-5'-P-CCNC: 5 U/L (ref 0–45)
BILIRUB SERPL-MCNC: 0.4 MG/DL (ref 0.2–1.3)
BUN SERPL-MCNC: 17 MG/DL (ref 7–30)
CALCIUM SERPL-MCNC: 9.4 MG/DL (ref 8.5–10.1)
CHLORIDE BLD-SCNC: 103 MMOL/L (ref 94–109)
CO2 SERPL-SCNC: 24 MMOL/L (ref 20–32)
CREAT SERPL-MCNC: 0.72 MG/DL (ref 0.52–1.04)
GFR SERPL CREATININE-BSD FRML MDRD: >90 ML/MIN/1.73M2
GLUCOSE BLD-MCNC: 97 MG/DL (ref 70–99)
POTASSIUM BLD-SCNC: 4.3 MMOL/L (ref 3.4–5.3)
PROT SERPL-MCNC: 8.6 G/DL (ref 6.8–8.8)
SODIUM SERPL-SCNC: 135 MMOL/L (ref 133–144)

## 2022-06-27 PROCEDURE — 99214 OFFICE O/P EST MOD 30 MIN: CPT | Mod: 95 | Performed by: NURSE PRACTITIONER

## 2022-06-27 RX ORDER — VALACYCLOVIR HYDROCHLORIDE 1 G/1
2000 TABLET, FILM COATED ORAL 2 TIMES DAILY
Qty: 12 TABLET | Refills: 6 | Status: SHIPPED | OUTPATIENT
Start: 2022-06-27 | End: 2024-07-23

## 2022-06-27 NOTE — PROGRESS NOTES
Caroline is a 41 year old who is being evaluated via a billable video visit.      How would you like to obtain your AVS?   If the video visit is dropped, the invitation should be resent by:   Will anyone else be joining your video visit?           Assessment & Plan      ICD-10-CM    1. Hospital discharge follow-up  Z09    2. Cold sore  B00.1 valACYclovir (VALTREX) 1000 mg tablet   3. Anemia, unspecified type  D64.9    4. Skin lesion  L98.9 Adult Dermatology Referral     Adult Dermatology Referral   5. Change in mole  D22.9 Adult Dermatology Referral   6. Tick bite of right thigh, initial encounter  S70.361A Adult Dermatology Referral    W57.XXXA Adult Dermatology Referral     Rickettsia rickettsii antibody IgG & IgM   7. Ruptured appendicitis  K35.32    improving although following issues also addressed, check for RMSF and follow up with derm      1. Cold sore  -Controlled. Refill medication.   - valACYclovir (VALTREX) 1000 mg tablet; Take 2 tablets (2,000 mg) by mouth 2 times daily  Dispense: 12 tablet; Refill: 6    2. Hospital discharge follow-up  -Improving. Planning to follow up with general surgery.     3. Anemia, unspecified type  -Recommend rechecking CBC and possibly Ferritin in future in 2-3 months if indicated at that time, instructed how to take iron supplement daily.     4. Skin lesion  -Nevi located on lower back and tick bite lesion located on inner thigh, lymes was negative but was [place] tick bit in May not yet resolved, needs to be looked at in person, to rule out foreign body and/or infection  Currently on Cipro  - Adult Dermatology Referral; Future    5. Change in mole  Improving but was larger in pregnancy  - Adult Dermatology Referral; Future    6. Tick bite of right thigh, initial encounter  -Recent lyme test negative. Bite site has not improved much in a month. Ensure that no piece of the tick is left in the skin. If becomes swollen or worse  need to see provider.   - Adult Dermatology Referral;  Future    7. Ruptured appendicitis   -Stable. No signs of infection. Continue to monitor for fevers and increased pain.     Brandy Velazquez is a 41 year old, presenting for the following health issues:  No chief complaint on file.    Original appointment was to establish care with Giovanna.     Was recently hospitalized from 6/23 to 6/26 for ruptured appendix. She was treated conservatively and sent home on oral antibiotics. The appendix became a problem about 2.5 months ago. Currently, she has slept since been home. Occasional pain in right lower quadrant. Has taken ibuprofen/tylenol. No fevers. Does not have have general surgery appointment yet but does have number to call. CT scan needs to be done prior to seeing general surgery. Planning to call today.Tolerating antibiotics-feels a little itchy after taking them (no rashes just flushed).     CT scan with kidney cyst. Not having urinary problems. No family history of polycystic kidney disease. Will recheck this once second CT scan is done.       Additional concerns she did not have looked at inpatient and needs addressed today:    Menstruation- currently on her period. Can try taking ibuprofen to help with symptoms.     Had a tick bite late May inner thigh, still a bit raised.   lymes was checking in  6/22, which was negative   She was in [facility] and something else [place] and that lymes is rare there        Hospital Follow-up Visit:    Hospital/Nursing Home/IP Rehab Facility: [facility]  Date of Admission: 6/23  Date of Discharge: 6/26  Reason(s) for Admission: Ruptured appendicitis     Was your hospitalization related to COVID-19? No   Problems taking medications regularly:  None  Medication changes since discharge: ciprofloxacin 500 mg twice a day, metronidazole 500 mg TID  Problems adhering to non-medication therapy:  None    Summary of hospitalization:  [facility] discharge summary  reviewed  Diagnostic Tests/Treatments reviewed.  Follow up needed: Following up with general surgery in 4-6 weeks  Other Healthcare Providers Involved in Patient s Care:         None  Update since discharge: improved.       Post Discharge Medication Reconciliation: discharge medications reconciled, continue medications without change.  Plan of care communicated with patient          HPI       Review of Systems   Constitutional, HEENT, cardiovascular, pulmonary, gi and gu systems are negative, except as otherwise noted.      Objective           Vitals:  No vitals were obtained today due to virtual visit.    Physical Exam   GENERAL: Healthy, alert and no distress  EYES: Eyes grossly normal to inspection.  No discharge or erythema, or obvious scleral/conjunctival abnormalities.  RESP: No audible wheeze, cough, or visible cyanosis.  No visible retractions or increased work of breathing.    SKIN: Red skin lesion surrounded by mild erythema located on right inner thigh. (pt itching it recently).   PSYCH: Mentation appears normal, affect normal/bright, judgement and insight intact, normal speech and appearance well-groomed.            Video-Visit Details    Video Start Time: 0905    Type of service:  Video Visit    Video End Time:0940    Originating Location (pt. Location): Home    Distant Location (provider location):  [facility]     Platform used for Video Visit: [facility]    This patient was seen along with, Karley White-student, history and review of systems obtained by student and confirmed by attending. Objective, exams, assessment and plan reviewed with attending.     Karley White, CARIN Student     Giovanna QUIGLEY CNP     .  ..

## 2022-06-27 NOTE — PROGRESS NOTES
Caroline Pride is a patient that was hospitalized with ruptured appendicitis.  Attempted to contact patient via telephone for a status update and review post discharge  teaching.  LM on VM to call office.  Await return call.      Of note:  Wound:  No surgery or drain  Follow-up:  Dr. Rey via video 7/13 at 0900  Restrictions:  - No activity restrictions  New medications:  Cipro, Flagyl  Equipment/Supplies:  None    Will need CT scan in 4-6 weeks for evaluation of kidney cyst/appy. Order in by discharging provider.    Hold off on colonoscopy until see by Dr. Rey to discuss timing.

## 2022-06-27 NOTE — TELEPHONE ENCOUNTER
Pt had  v visit today  Looks like they were addressed  No further action needed    Kalli Franco RN   [facility]

## 2022-06-27 NOTE — PROGRESS NOTES
06/27/22    2:20 PM     RN Post-Op/Post-Discharge Care Coordination Note    Spoke with Patient.    Support  Patient able to care for self independently     Health Status  Nausea/Vomiting: Patient denies nausea/vomiting.  Eating/drinking: Patient is able to eat and drink without any complaints. Recommended daily yogurt or Probiotic.  Bowel habits: Patient reports having a normal bowel movement.  Drains (ADY): N/A  Fevers/chills: Patient denies any fever or chills.  Incisions: No surgery.    Pain: Ache, not medicating.  New Medications:  Tolerating Flagyl and Cipro.     Activity/Restrictions  No restrictions    Equipment  None    Pathology reviewed with patient:  N/A    Forms/Letters  No    All of her questions were answered.  She will call this office if she has any further questions and/or concerns.      Follow up appointment arranged with Dr. Rey 7/13.    Whom and When to Call  Patient acknowledges understanding of how to manage any medication changes and   when to seek medical care.     Patient advised that if after hour medical concerns arise to please call [phone] and choose option 4 to speak to the physician on call.

## 2022-06-27 NOTE — TELEPHONE ENCOUNTER
[facility] Call Center    Phone Message    May a detailed message be left on voicemail: yes     Reason for Call: Appointment Intake    Referring Provider Name: MELANIE Mistry  Diagnosis and/or Symptoms: tick bite in May in right thigh, check for foreign body please vs local reaction vs lymes possibly?- currently scheduled for 11/14, however, referring provider wants pt seen within 3-5 days. Please review and call pt to discuss. Pt is willing to go to any additional location ([place], [place], [place] or [place]), thanks!    Action Taken: Message routed to:  [facility] Dermatology    Travel Screening: Not Applicable

## 2022-06-28 LAB
BACTERIA BLD CULT: NO GROWTH
BACTERIA BLD CULT: NO GROWTH

## 2022-06-28 NOTE — TELEPHONE ENCOUNTER
I called and spoke with a nurse in Giovanna Mistry's office. We have no openings in 3-5 days. If provider would like patient seen urgently they would need to reach out to the resident on call to coordinate any appointment.    YOAN Burden

## 2022-06-28 NOTE — TELEPHONE ENCOUNTER
Cezar Heredia,    [facility] ([facility] Dermatology called stating that their schedule is booked out till October. Rep mentioned, urgent referrals can be directed through the on call resident .    Thank you

## 2022-06-29 NOTE — TELEPHONE ENCOUNTER
Pt can go elsewhere, [facility] or let me know if she can get in sooner somewhere else. Thanks, Giovanna

## 2022-07-02 LAB
R RICKETTSI IGG TITR SER IF: NORMAL {TITER}
R RICKETTSI IGM TITR SER IF: NORMAL {TITER}

## 2022-07-07 NOTE — TELEPHONE ENCOUNTER
REFERRAL INFORMATION:    Referring Provider:  Laisha    Referring Clinic:  [facility]    Reason for Visit/Diagnosis: discuss interval appendectomy       FUTURE VISIT INFORMATION:    Appointment Date:7.13.22    Appointment Time: 9 AM     NOTES RECORD STATUS  DETAILS   OFFICE NOTE from Referring Provider Internal 6.23.22 laisha [facility]ELITON [facility]   OFFICE NOTE from Other Specialists N/A    HOSPITAL DISCHARGE SUMMARY/ ED VISITS  N/A    OPERATIVE REPORT N/A    ENDOSCOPY (EGD)  N/A    PERTINENT LABS Internal    PATHOLOGY REPORTS (RELATED) N/A    IMAGING (CT, MRI, US, XR)  Internal 6.23.22 CT ab pelvis

## 2022-07-12 ENCOUNTER — PATIENT OUTREACH (OUTPATIENT)
Dept: SURGERY | Facility: CLINIC | Age: 42
End: 2022-07-12

## 2022-07-12 NOTE — PROGRESS NOTES
Patient Telephone Reminder Call    Date of call:  07/12/22  Phone numbers:  Home number on file [phone] (home)    Reached patient/confirmed appointment:  No - left message:   on voicemail  Appointment with:   Dr. Ethan Rey  Reason for visit:  Appendectomy consult

## 2022-07-13 ENCOUNTER — VIRTUAL VISIT (OUTPATIENT)
Dept: SURGERY | Facility: CLINIC | Age: 42
End: 2022-07-13
Payer: COMMERCIAL

## 2022-07-13 ENCOUNTER — PRE VISIT (OUTPATIENT)
Dept: SURGERY | Facility: CLINIC | Age: 42
End: 2022-07-13

## 2022-07-13 VITALS — BODY MASS INDEX: 28.49 KG/M2 | WEIGHT: 188 LBS | HEIGHT: 68 IN

## 2022-07-13 DIAGNOSIS — K35.32 PERFORATED APPENDICITIS: ICD-10-CM

## 2022-07-13 PROCEDURE — 99203 OFFICE O/P NEW LOW 30 MIN: CPT | Mod: 95 | Performed by: SURGERY

## 2022-07-13 RX ORDER — CEFAZOLIN SODIUM 2 G/50ML
2 SOLUTION INTRAVENOUS
Status: CANCELLED | OUTPATIENT
Start: 2022-07-13

## 2022-07-13 RX ORDER — CEFAZOLIN SODIUM 2 G/50ML
2 SOLUTION INTRAVENOUS SEE ADMIN INSTRUCTIONS
Status: CANCELLED | OUTPATIENT
Start: 2022-07-13

## 2022-07-13 ASSESSMENT — PAIN SCALES - GENERAL: PAINLEVEL: NO PAIN (0)

## 2022-07-13 NOTE — LETTER
7/13/2022       RE: Caroline Pride  [address]  [place] [address]     Dear Colleague,    Thank you for referring your patient, Caroline Pride, to the [facility] [place] [facility]. Please see a copy of my visit note below.    I spoke with Caroline Pride along with her wife regarding her recent admission for appendicitis- which was treated with ABX given her 2 week duration of symptoms and degree of inflammation seen in the RLQ.  No abscess on imaging, no drainage performed.  Had a kidney lesion of unclear etiology seen as well for which a repeat CT scan is ordered.    She reports doing well. Denies fevers/chills/nausea/vomiting.  Feels very tired still.  Intermittent joint pain which is new for her.  No prior abdominal surgeries.    PE:  A+Ox3, NAD  Pleasant  Fluent speech  No resp distress  No jaundice    I reviewed her prior CT scan. We spoke about interval appendectomy vs observation. Risks and benefits of both described.  We agreed to pursue interval appendectomy in Mid to late august.  I will plan to view her repeat CT scan as well to make sure this does not alter our plans.     -To OR for interval appendectomy      Sincerely,    Ethan Rey MD

## 2022-07-13 NOTE — PROGRESS NOTES
Caroline is a 41 year old who is being evaluated via a billable video visit.      How would you like to obtain your AVS? MyChart  If the video visit is dropped, the invitation should be resent by: [phone]    Will anyone else be joining your video visit? No      During this virtual visit the patient is located in [place], patient verifies this as the location during the entirety of this visit.       Video-Visit Details    Video Start Time: 8:41 AM    Type of service:  Video Visit    Video End Time:8:57 AM    Originating Location (pt. Location): Home    Distant Location (provider location):  [facility] SUR[facility]     Platform used for Video Visit: Maria Elena    I spoke with Caroline Pride along with her wife regarding her recent admission for appendicitis- which was treated with ABX given her 2 week duration of symptoms and degree of inflammation seen in the RLQ.  No abscess on imaging, no drainage performed.  Had a kidney lesion of unclear etiology seen as well for which a repeat CT scan is ordered.    She reports doing well. Denies fevers/chills/nausea/vomiting.  Feels very tired still.  Intermittent joint pain which is new for her.  No prior abdominal surgeries.    PE:  A+Ox3, NAD  Pleasant  Fluent speech  No resp distress  No jaundice    I reviewed her prior CT scan. We spoke about interval appendectomy vs observation. Risks and benefits of both described.  We agreed to pursue interval appendectomy in Mid to late august.  I will plan to view her repeat CT scan as well to make sure this does not alter our plans.     -To OR for interval appendectomy

## 2022-07-13 NOTE — NURSING NOTE
"Chief Complaint   Patient presents with     New Patient     Discuss interval appendectomy       Vitals:    07/13/22 0735   Weight: 85.3 kg (188 lb)   Height: 1.721 m (5' 7.75\")       Body mass index is 28.8 kg/m .                          Manuel Hinojosa, EMT    "

## 2022-07-13 NOTE — PATIENT INSTRUCTIONS
You met with Dr. Ethan Rey.      Today's visit instructions:    Dr. Rey recommends you undergo surgery to remove your appendix in mid to late August.     Reminder: Surgery Requirements  Your surgery will be at [facility]- 5th Floor  You will need to arrive 1.5 hours early.  You will need someone to drive you home (over 18 years old) and stay with you for 24 hours after the procedure.  You will need a preop physical with your regular doctor within 30 days of surgery- closer is always better.  Stop any blood thinners, vitamins, minerals, or herbal supplements 5 days before surgery.  If you are taking a prescribed blood thinner please let us know for specific instructions.  Fasting- a nurse from [facility] will call you 1-2 days before surgery to confirm your procedure and tell you when to stop eating and drinking.   Wash with the soap (Antibacterial, Dial Complete Foam, Hibiclense, or soap given/mailed from the clinic) the night before surgery and morning of surgery. See instructions in the Surgery Packet.  You will need to undergo a COVID-19 test 2-4 days prior to your scheduled procedure.  Please see the handout . Our surgery scheduler can help arrange testing if you do not have a home test.  If you would like a procedure estimate please call Cost of Care at [phone].       If you have questions please contact Sandra RN or Tanika RN during regular clinic hours, Monday through Friday 7:30 AM - 4:00 PM, or you can contact us via [facility] at anytime.       If you have urgent needs after-hours, weekends, or holidays please call the hospital at [phone] and ask to speak with our on-call General Surgery Team.    Appointment [phone]  Nurse Advice (Sandra or Tanika): [phone]   Surgery Scheduler ([place]): [phone]  Fax: [phone]    After your Laparoscopic Appendectomy        Incision care   You may take a shower the day after surgery. Carefully wash  your incision with soap and water. Do not submerge yourself in water (bath, whirlpool, hot tub, pool, lake) for 14 days after surgery.   Remove the bandage the day after surgery, but leave the medical tape (Steri-Strips) or glue in place. These will loosen and fall off on their own 1-2 weeks after surgery.     Always wash your hands before touching your incisions or removing bandages.   It is not unusual to form a collection of fluid or blood under your incision that may feel firm or squishy- it can take several weeks to months for your body to reabsorb it.  At times, it may even drain.  If that should happen keep the area clean with soap, water,  and cover with a clean gauze dressing. You can change this daily or as needed.       Other medicines   Wait to start aspirin or blood thinners until the day after surgery. You can continue your regular medicines at your normal time the day after surgery.    Your pain medicine may cause constipation (hard, dry stools). To help with this, take the stool softener your doctor gave you or an over-the-counter stool softener or laxative. You can stop taking this when you are no longer taking pain medicine and your bowel movements are back to normal.      For pain or discomfort   Take the narcotic pain medicine your doctor gave you as needed and as instructed on the bottle. If you prefer to use over-the-counter medication, use acetaminophen (Tylenol) or ibuprofen (Advil, Motrin) as instructed on the box. Do not take Tylenol if it is in your narcotic pain medication.   Use an ice pack on your abdomen (belly) for 20 minutes at a time as needed for the first 24 hours. Be sure to protect your skin by putting a cloth between the ice pack and your skin.   After 24 hours you can switch to heat for 20 minutes as needed. Be sure to protect your skin by putting a cloth between the heat pack and your skin.   You may experience right shoulder pain after surgery which will go away 1-4 days  after your procedure.  This is related to the gas that was used to inflate your abdomen, it gets trapped between your liver and diaphragm.  Walk frequently and apply a heating pad to the area (protecting your skin from the heating pad with a barrier such as a towel.     Activities   No driving until you feel it s safe to do so. Don t drive while taking narcotic pain medicine.   Don t lift anything heavier than 20 pounds for 3 to 4 weeks after surgery.      Special equipment   None     Diet   You can eat your regular meals after surgery.      When to call the doctor   Call your doctor if you have:   A fever above 101 F (38.3 C) (taken under the tongue), or a fever or chills lasting more than a day.   Redness at the incision site.   Any fluid or blood draining from the incision, especially if it smells bad.    Severe pain that doesn t improve with pain medicine.        We will call you 2 to 4 days after surgery to review this handout, answer questions and help arrange after-surgery care. If you have questions or concerns, please call [phone] during regular office hours. If you need to call after business hours, call [phone] and ask to page the surgeon on-call.

## 2022-07-18 ENCOUNTER — TELEPHONE (OUTPATIENT)
Dept: SURGERY | Facility: CLINIC | Age: 42
End: 2022-07-18

## 2022-07-18 NOTE — TELEPHONE ENCOUNTER
Attempted to contact patient in order to schedule surgery with Dr. Rey, no answer. Left voicemail instructing patient to return call to [phone] in order to schedule.

## 2022-07-26 ENCOUNTER — ANCILLARY PROCEDURE (OUTPATIENT)
Dept: CT IMAGING | Facility: CLINIC | Age: 42
End: 2022-07-26
Attending: PHYSICIAN ASSISTANT
Payer: COMMERCIAL

## 2022-07-26 ENCOUNTER — TELEPHONE (OUTPATIENT)
Dept: FAMILY MEDICINE | Facility: CLINIC | Age: 42
End: 2022-07-26

## 2022-07-26 DIAGNOSIS — K35.32 PERFORATED APPENDICITIS: ICD-10-CM

## 2022-07-26 LAB — RADIOLOGIST FLAGS: ABNORMAL

## 2022-07-26 PROCEDURE — 74178 CT ABD&PLV WO CNTR FLWD CNTR: CPT | Mod: GC | Performed by: STUDENT IN AN ORGANIZED HEALTH CARE EDUCATION/TRAINING PROGRAM

## 2022-07-26 RX ORDER — IOPAMIDOL 755 MG/ML
104 INJECTION, SOLUTION INTRAVASCULAR ONCE
Status: COMPLETED | OUTPATIENT
Start: 2022-07-26 | End: 2022-07-26

## 2022-07-26 RX ADMIN — IOPAMIDOL 104 ML: 755 INJECTION, SOLUTION INTRAVASCULAR at 08:22

## 2022-07-26 NOTE — TELEPHONE ENCOUNTER
Relayed message from Dr. Villela to Imaging, they made note in chart on their end.     Karley Plummer RN  [facility]

## 2022-07-27 ENCOUNTER — MYC MEDICAL ADVICE (OUTPATIENT)
Dept: FAMILY MEDICINE | Facility: CLINIC | Age: 42
End: 2022-07-27

## 2022-07-27 ENCOUNTER — NURSE TRIAGE (OUTPATIENT)
Dept: FAMILY MEDICINE | Facility: CLINIC | Age: 42
End: 2022-07-27

## 2022-07-27 DIAGNOSIS — N28.89 MASS OF LEFT KIDNEY: Primary | ICD-10-CM

## 2022-07-27 NOTE — TELEPHONE ENCOUNTER
CT done yesterday:  IMPRESSION:      1. Exophytic enhancing predominantly solid left renal mass, renal cell  carcinoma until proven otherwise. No renal vein or IVC thrombosis. Few  prominent left para-aortic retroperitoneal lymph nodes are  indeterminate. Question slight extension of the mass into the renal  sinus.  2. In this patient with history of perforated appendicitis,  significant decrease in inflammatory changes in the right lower  quadrant with no fluid collection or phlegmon, focal dilated appendix  measuring up to 13 mm, consider correlation for right lower quadrant  pain to rule out recurrent appendicitis.  3. Right adnexal hypodensity likely representing complex cyst similar  to CT 6/23/2022, may consider pelvic ultrasound for further  characterization  4. Indeterminate subcentimeter enhancing observation in segment 7/8 of  the liver, may represent vascular phenomenon, flash filling hemangioma  versus neoplastic lesion in this patient with likely renal cell  carcinoma. Consider MRI liver with [facility] for further evaluation.

## 2022-07-27 NOTE — TELEPHONE ENCOUNTER
Called and reviewed findings of CT scan with the patient.  The changes around the appendix actually are looking better and she should follow-up with general surgery to discuss removal.  The more concerning finding would be a mass in the left kidney that had been noted on the CT scan during her last hospitalization as well.  Because of that I will put in a referral to urology for assistance in management.  Patient was urged to message or call back if other questions come up as we had a relatively short period of time to talk.    Kareem Villela MD

## 2022-07-28 ENCOUNTER — PRE VISIT (OUTPATIENT)
Dept: UROLOGY | Facility: CLINIC | Age: 42
End: 2022-07-28

## 2022-07-28 NOTE — TELEPHONE ENCOUNTER
MEDICAL RECORDS REQUEST   [facility]  Urology Clinic  [address] [place] [address]  PHONE: [phone]  Fax: [phone]        FUTURE VISIT INFORMATION                                                   Caroline Serna Bigg, : 1980 scheduled for future visit at [facility]    APPOINTMENT INFORMATION:    Date: 2022    Provider:  Luis Fernando Sigala MD    Reason for Visit/Diagnosis: kidney cancer    REFERRAL INFORMATION:    Referring provider:  Kareem Villela MD in  PRIMARY CARE    RECORDS REQUESTED FOR VISIT                                                     NOTES  STATUS/DETAILS   DISCHARGE REPORT from the ER   yes, 2022 -- Scottie Melgar MD -- [facility]   MEDICATION LIST  yes   LABS     URINALYSIS (UA)  yes   KIDNEY CANCER     IMAGING (IMAGES & REPORT)  yes, 2022, 2022 -- CT ABD PELVIS   2022 -- US PELVIC     PRE-VISIT CHECKLIST      Record collection complete Yes   Appointment appropriately scheduled           (right time/right provider) Yes   Joint diagnostic appointment coordinated correctly          (ensure right order & amount of time) Yes   MyChart activation Yes   Questionnaire complete If no, please explain pending

## 2022-07-29 ENCOUNTER — PATIENT OUTREACH (OUTPATIENT)
Dept: SURGERY | Facility: CLINIC | Age: 42
End: 2022-07-29

## 2022-07-29 NOTE — PROGRESS NOTES
Second attempt made to call patient to schedule laparoscopic appendectomy with Dr. Rey. GOLDEN with instructions to call the surgery scheduler at [phone] or the General Surgery nurse line at [phone] when she is ready to schedule surgery. [employer] message also sent.

## 2022-08-16 ENCOUNTER — PRE VISIT (OUTPATIENT)
Dept: UROLOGY | Facility: CLINIC | Age: 42
End: 2022-08-16

## 2022-08-16 ASSESSMENT — ENCOUNTER SYMPTOMS
HEADACHES: 0
CHILLS: 0
ARTHRALGIAS: 0
HEMATOCHEZIA: 0
FREQUENCY: 0
MYALGIAS: 0
SORE THROAT: 0
HEMATURIA: 0
JOINT SWELLING: 0
BREAST MASS: 0
DIZZINESS: 0
NAUSEA: 0
FEVER: 0
EYE PAIN: 0
DIARRHEA: 0
WEAKNESS: 0
NERVOUS/ANXIOUS: 0
PALPITATIONS: 0
HEARTBURN: 0
ABDOMINAL PAIN: 0
COUGH: 0
DYSURIA: 0
CONSTIPATION: 0
PARESTHESIAS: 0
SHORTNESS OF BREATH: 0

## 2022-08-16 NOTE — CONFIDENTIAL NOTE
Reason for visit: consult     Relevant information: renal cancer    Records/imaging/labs/orders: in epic    Pt called: n/a    At Rooming: video    Keyla Prakash  8/16/2022  1:06 PM

## 2022-08-17 ENCOUNTER — LAB (OUTPATIENT)
Dept: LAB | Facility: CLINIC | Age: 42
End: 2022-08-17
Payer: COMMERCIAL

## 2022-08-17 ENCOUNTER — OFFICE VISIT (OUTPATIENT)
Dept: FAMILY MEDICINE | Facility: CLINIC | Age: 42
End: 2022-08-17

## 2022-08-17 VITALS
OXYGEN SATURATION: 98 % | BODY MASS INDEX: 29.55 KG/M2 | SYSTOLIC BLOOD PRESSURE: 126 MMHG | DIASTOLIC BLOOD PRESSURE: 78 MMHG | HEIGHT: 68 IN | RESPIRATION RATE: 16 BRPM | TEMPERATURE: 98.1 F | WEIGHT: 195 LBS | HEART RATE: 92 BPM

## 2022-08-17 DIAGNOSIS — Z13.1 SCREENING FOR DIABETES MELLITUS: ICD-10-CM

## 2022-08-17 DIAGNOSIS — E03.8 SUBCLINICAL HYPOTHYROIDISM: ICD-10-CM

## 2022-08-17 DIAGNOSIS — R53.83 FATIGUE, UNSPECIFIED TYPE: ICD-10-CM

## 2022-08-17 DIAGNOSIS — Z00.00 ROUTINE GENERAL MEDICAL EXAMINATION AT A HEALTH CARE FACILITY: Primary | ICD-10-CM

## 2022-08-17 DIAGNOSIS — Z87.19 HX OF APPENDICITIS: ICD-10-CM

## 2022-08-17 DIAGNOSIS — D64.9 ANEMIA, UNSPECIFIED TYPE: ICD-10-CM

## 2022-08-17 DIAGNOSIS — Z87.42 HISTORY OF PCOS: ICD-10-CM

## 2022-08-17 DIAGNOSIS — Z12.4 CERVICAL CANCER SCREENING: ICD-10-CM

## 2022-08-17 DIAGNOSIS — N28.89 MASS OF LEFT KIDNEY: ICD-10-CM

## 2022-08-17 DIAGNOSIS — B36.0 TINEA VERSICOLOR: ICD-10-CM

## 2022-08-17 DIAGNOSIS — Z13.220 LIPID SCREENING: ICD-10-CM

## 2022-08-17 LAB
ALBUMIN SERPL-MCNC: 3.8 G/DL (ref 3.4–5)
ALBUMIN UR-MCNC: NEGATIVE MG/DL
ALP SERPL-CCNC: 57 U/L (ref 40–150)
ALT SERPL W P-5'-P-CCNC: 20 U/L (ref 0–50)
ANION GAP SERPL CALCULATED.3IONS-SCNC: 8 MMOL/L (ref 3–14)
APPEARANCE UR: CLEAR
AST SERPL W P-5'-P-CCNC: 4 U/L (ref 0–45)
BASOPHILS # BLD AUTO: 0 10E3/UL (ref 0–0.2)
BASOPHILS NFR BLD AUTO: 0 %
BILIRUB SERPL-MCNC: 0.4 MG/DL (ref 0.2–1.3)
BILIRUB UR QL STRIP: NEGATIVE
BUN SERPL-MCNC: 16 MG/DL (ref 7–30)
CALCIUM SERPL-MCNC: 9 MG/DL (ref 8.5–10.1)
CHLORIDE BLD-SCNC: 108 MMOL/L (ref 94–109)
CHOLEST SERPL-MCNC: 165 MG/DL
CO2 SERPL-SCNC: 21 MMOL/L (ref 20–32)
COLOR UR AUTO: YELLOW
CREAT SERPL-MCNC: 0.6 MG/DL (ref 0.52–1.04)
CRP SERPL-MCNC: 4.9 MG/L (ref 0–8)
EOSINOPHIL # BLD AUTO: 0.4 10E3/UL (ref 0–0.7)
EOSINOPHIL NFR BLD AUTO: 5 %
ERYTHROCYTE [DISTWIDTH] IN BLOOD BY AUTOMATED COUNT: 14.1 % (ref 10–15)
FASTING STATUS PATIENT QL REPORTED: YES
FERRITIN SERPL-MCNC: 33 NG/ML (ref 12–150)
GFR SERPL CREATININE-BSD FRML MDRD: >90 ML/MIN/1.73M2
GLUCOSE BLD-MCNC: 104 MG/DL (ref 70–99)
GLUCOSE UR STRIP-MCNC: NEGATIVE MG/DL
HCT VFR BLD AUTO: 37.9 % (ref 35–47)
HDLC SERPL-MCNC: 48 MG/DL
HGB BLD-MCNC: 12.7 G/DL (ref 11.7–15.7)
HGB UR QL STRIP: NEGATIVE
KETONES UR STRIP-MCNC: NEGATIVE MG/DL
LDLC SERPL CALC-MCNC: 99 MG/DL
LEUKOCYTE ESTERASE UR QL STRIP: NEGATIVE
LYMPHOCYTES # BLD AUTO: 1.9 10E3/UL (ref 0.8–5.3)
LYMPHOCYTES NFR BLD AUTO: 27 %
MCH RBC QN AUTO: 29.1 PG (ref 26.5–33)
MCHC RBC AUTO-ENTMCNC: 33.5 G/DL (ref 31.5–36.5)
MCV RBC AUTO: 87 FL (ref 78–100)
MONOCYTES # BLD AUTO: 0.4 10E3/UL (ref 0–1.3)
MONOCYTES NFR BLD AUTO: 5 %
NEUTROPHILS # BLD AUTO: 4.6 10E3/UL (ref 1.6–8.3)
NEUTROPHILS NFR BLD AUTO: 63 %
NITRATE UR QL: NEGATIVE
NONHDLC SERPL-MCNC: 117 MG/DL
PH UR STRIP: 5.5 [PH] (ref 5–7)
PLATELET # BLD AUTO: 297 10E3/UL (ref 150–450)
POTASSIUM BLD-SCNC: 4.2 MMOL/L (ref 3.4–5.3)
PROT SERPL-MCNC: 8.1 G/DL (ref 6.8–8.8)
RBC # BLD AUTO: 4.37 10E6/UL (ref 3.8–5.2)
SODIUM SERPL-SCNC: 137 MMOL/L (ref 133–144)
SP GR UR STRIP: 1.01 (ref 1–1.03)
TRIGL SERPL-MCNC: 92 MG/DL
TSH SERPL DL<=0.005 MIU/L-ACNC: 1.63 MU/L (ref 0.4–4)
UROBILINOGEN UR STRIP-ACNC: 0.2 E.U./DL
WBC # BLD AUTO: 7.3 10E3/UL (ref 4–11)

## 2022-08-17 PROCEDURE — 36415 COLL VENOUS BLD VENIPUNCTURE: CPT

## 2022-08-17 PROCEDURE — G0145 SCR C/V CYTO,THINLAYER,RESCR: HCPCS | Performed by: NURSE PRACTITIONER

## 2022-08-17 PROCEDURE — 86140 C-REACTIVE PROTEIN: CPT

## 2022-08-17 PROCEDURE — 81003 URINALYSIS AUTO W/O SCOPE: CPT

## 2022-08-17 PROCEDURE — 82728 ASSAY OF FERRITIN: CPT

## 2022-08-17 PROCEDURE — 99396 PREV VISIT EST AGE 40-64: CPT | Performed by: NURSE PRACTITIONER

## 2022-08-17 PROCEDURE — 99214 OFFICE O/P EST MOD 30 MIN: CPT | Mod: 25 | Performed by: NURSE PRACTITIONER

## 2022-08-17 PROCEDURE — 80061 LIPID PANEL: CPT

## 2022-08-17 PROCEDURE — 80050 GENERAL HEALTH PANEL: CPT

## 2022-08-17 PROCEDURE — 87624 HPV HI-RISK TYP POOLED RSLT: CPT | Performed by: NURSE PRACTITIONER

## 2022-08-17 RX ORDER — KETOCONAZOLE 20 MG/G
CREAM TOPICAL DAILY
Qty: 60 G | Refills: 1 | Status: SHIPPED | OUTPATIENT
Start: 2022-08-17 | End: 2023-08-24

## 2022-08-17 RX ORDER — KETOCONAZOLE 20 MG/ML
SHAMPOO TOPICAL DAILY
Qty: 120 ML | Refills: 1 | Status: SHIPPED | OUTPATIENT
Start: 2022-08-17 | End: 2022-09-07

## 2022-08-17 ASSESSMENT — ENCOUNTER SYMPTOMS
NERVOUS/ANXIOUS: 0
HEADACHES: 0
DYSURIA: 0
DIARRHEA: 0
PARESTHESIAS: 0
FREQUENCY: 0
PALPITATIONS: 0
HEARTBURN: 0
JOINT SWELLING: 0
COUGH: 0
NAUSEA: 0
WEAKNESS: 0
SHORTNESS OF BREATH: 0
DIZZINESS: 0
HEMATOCHEZIA: 0
EYE PAIN: 0
FEVER: 0
SORE THROAT: 0
MYALGIAS: 0
CHILLS: 0
CONSTIPATION: 0
BREAST MASS: 0
ABDOMINAL PAIN: 0
HEMATURIA: 0
ARTHRALGIAS: 0

## 2022-08-17 ASSESSMENT — PAIN SCALES - GENERAL: PAINLEVEL: NO PAIN (0)

## 2022-08-17 NOTE — PROGRESS NOTES
SUBJECTIVE:   CC: Caroline Pride is an 41 year old woman who presents for preventive health visit.       Patient has been advised of split billing requirements and indicates understanding: Yes  Healthy Habits:     Getting at least 3 servings of Calcium per day:  Yes    Bi-annual eye exam:  NO    Dental care twice a year:  NO    Sleep apnea or symptoms of sleep apnea:  Daytime drowsiness    Diet:  Vegetarian/vegan    Frequency of exercise:  2-3 days/week    Duration of exercise:  15-30 minutes    Taking medications regularly:  Yes    Medication side effects:  None    PHQ-2 Total Score: 0    Additional concerns today:  Yes    PROBLEMS TO ADD ON...  Will see urology for follow up and recent appy ruptured, left kidney mass could be cancerous and still having fatigue. would like more work up for this today, blood and urine recheck, doesn't have any specific requests from urology, admits has been forgetting to take iron was good at first  Peeing fine, no urinary symptoms    History of PCOS, no std check needed monogamous with wife     Does add they had a recent cold she just recovered from    Some anxiety at times but has good support and coping okay overall    Rash between her breasts has had for months and not going away, maybe spreading doesn't itch or hurt     Today's PHQ-2 Score:   PHQ-2 ( 1999 Pf[employer]) 8/16/2022   Q1: Little interest or pleasure in doing things 0   Q2: Feeling down, depressed or hopeless 0   PHQ-2 Score 0   PHQ-2 Total Score (12-17 Years)- Positive if 3 or more points; Administer PHQ-A if positive -   Q1: Little interest or pleasure in doing things Not at all   Q2: Feeling down, depressed or hopeless Not at all   PHQ-2 Score 0       Abuse: Current or Past (Physical, Sexual or Emotional) - No  Do you feel safe in your environment? Yes      Social History     Tobacco Use     Smoking status: Former Smoker     Packs/day: 0.00     Years: 0.00     Pack years: 0.00     Quit date: 1/13/2019     Years  since quitting: 3.5     Smokeless tobacco: Never Used   Substance Use Topics     Alcohol use: Yes     Comment: occasional     If you drink alcohol do you typically have >3 drinks per day or >7 drinks per week? No    Alcohol Use 2022   Prescreen: >3 drinks/day or >7 drinks/week? No       Reviewed orders with patient.  Reviewed health maintenance and updated orders accordingly - Yes  Lab work is in process  Labs reviewed in EPIC  BP Readings from Last 3 Encounters:   22 126/78   22 124/86   22 122/84    Wt Readings from Last 3 Encounters:   22 88.5 kg (195 lb)   22 85.3 kg (188 lb)   22 85.3 kg (188 lb)                  Patient Active Problem List   Diagnosis     Other procreative management counseling and advice     Generalized anxiety disorder     Subclinical hypothyroidism     Vitamin D deficiency     Need for Tdap vaccination     AMA (advanced maternal age) primigravida 35+, first trimester     Labor and delivery, indication for care     Alcohol consumption binge drinking     History of PCOS     Irregular periods/menstrual cycles     Non-celiac gluten sensitivity     Perforated appendicitis     Tinea versicolor     Fatigue, unspecified type     Anemia, unspecified type     No past surgical history on file.    Social History     Tobacco Use     Smoking status: Former Smoker     Packs/day: 0.00     Years: 0.00     Pack years: 0.00     Quit date: 2019     Years since quitting: 3.5     Smokeless tobacco: Never Used   Substance Use Topics     Alcohol use: Yes     Comment: occasional     Family History   Problem Relation Age of Onset     Thyroid Disease Mother      Anemia Mother      Depression Father      Anxiety Disorder Father      Heart Disease Father      Hypertension Sister      Anxiety Disorder Sister      Thyroid Disease Sister      Breast Cancer Maternal Grandmother          at 42y     Parkinsonism Maternal Uncle      Hypertension Sister      Anxiety Disorder  Sister      Thyroid Disease Sister          Current Outpatient Medications   Medication Sig Dispense Refill     IRON PO        ketoconazole (NIZORAL) 2 % external cream Apply topically daily 60 g 1     ketoconazole (NIZORAL) 2 % external shampoo Apply topically daily for 21 days 120 mL 1     Omega-3 Fatty Acids (FISH OIL PO)        valACYclovir (VALTREX) 1000 mg tablet Take 2 tablets (2,000 mg) by mouth 2 times daily 12 tablet 6     No Known Allergies    Breast Cancer Screening:    FHS-7:   Breast CA Risk Assessment (FHS-7) 2022   Did any of your first-degree relatives have breast or ovarian cancer? No   Did any of your relatives have bilateral breast cancer? No   Did any man in your family have breast cancer? No   Did any woman in your family have breast and ovarian cancer? Yes   Did any woman in your family have breast cancer before age 50 y? Yes   Do you have 2 or more relatives with breast and/or ovarian cancer? No   Do you have 2 or more relatives with breast and/or bowel cancer? No       Mammogram Screening - Offered annual screening and updated Health Maintenance for mutual plan based on risk factor consideration    Pertinent mammograms are reviewed under the imaging tab.    History of abnormal Pap smear: NO - age 30-65 PAP every 5 years with negative HPV co-testing recommended  Last 3 Pap and HPV Results:       Reviewed and updated as needed this visit by clinical staff                    Reviewed and updated as needed this visit by Provider                   Past Medical History:   Diagnosis Date     Depressive disorder ongoing     PCOS (polycystic ovarian syndrome)      Subclinical hypothyroidism       No past surgical history on file.  OB History    Para Term  AB Living   1 1 1 0 0 1   SAB IAB Ectopic Multiple Live Births   0 0 0 0 1      # Outcome Date GA Lbr Fabricio/2nd Weight Sex Delivery Anes PTL Lv   1 Term 20 40w2d 03:15 / 01:15 3.629 kg (8 lb) M Vag-Spont EPI N SLICK       "Complications: Preeclampsia/Hypertension      Name: XENIA GALVAN,MALE-CAMILLE      Apgar1: 8  Apgar5: 9       Review of Systems   Constitutional: Negative for chills and fever.   HENT: Positive for congestion. Negative for ear pain, hearing loss and sore throat.    Eyes: Negative for pain and visual disturbance.   Respiratory: Negative for cough and shortness of breath.    Cardiovascular: Negative for chest pain, palpitations and peripheral edema.   Gastrointestinal: Negative for abdominal pain, constipation, diarrhea, heartburn, hematochezia and nausea.   Breasts:  Negative for tenderness, breast mass and discharge.   Genitourinary: Negative for dysuria, frequency, genital sores, hematuria, pelvic pain, urgency, vaginal bleeding and vaginal discharge.   Musculoskeletal: Negative for arthralgias, joint swelling and myalgias.   Skin: Positive for rash.   Neurological: Negative for dizziness, weakness, headaches and paresthesias.   Psychiatric/Behavioral: Negative for mood changes. The patient is not nervous/anxious.      Constitutional, eye, ENT, skin, breast, respiratory, cardiac, GI, , MSK, neuro, psych, and allergy are normal except as otherwise noted.       OBJECTIVE:   /78   Pulse 92   Temp 98.1  F (36.7  C) (Temporal)   Resp 16   Ht 1.727 m (5' 8\")   Wt 88.5 kg (195 lb)   LMP 07/26/2022 (Approximate)   SpO2 98%   BMI 29.65 kg/m    Physical Exam  GENERAL: healthy, alert and no distress  EYES: Eyes grossly normal to inspection, PERRL and conjunctivae and sclerae normal  HENT: ear canals and TM's normal, nose and mouth without ulcers or lesions  NECK: no adenopathy, no asymmetry, masses, or scars and thyroid normal to palpation  RESP: lungs clear to auscultation - no rales, rhonchi or wheezes  BREAST: normal without masses, tenderness or nipple discharge and no palpable axillary masses or adenopathy  CV: regular rate and rhythm, normal S1 S2, no S3 or S4, no murmur, click or rub, no peripheral edema " and peripheral pulses strong  ABDOMEN: soft, nontender, no hepatosplenomegaly, no masses and bowel sounds normal   (female): normal female external genitalia, normal urethral meatus, vaginal mucosa pink, moist, well rugated, and normal cervix/adnexa/uterus without masses or discharge  MS: no gross musculoskeletal defects noted, no edema  SKIN: rash on chest between breast of large tan patches no raised or lesions or drng  NEURO: Normal strength and tone, mentation intact and speech normal  PSYCH: mentation appears normal, affect normal/bright     Diagnostic Test Results:  Labs reviewed in Epic  No results found for this or any previous visit (from the past 24 hour(s)).    ASSESSMENT/PLAN:       ICD-10-CM    1. Routine general medical examination at a health care facility  Z00.00    2. Cervical cancer screening  Z12.4 Pap Screen with HPV - recommended age 30 - 65 years   3. Fatigue, unspecified type  R53.83 CRP, inflammation     Comprehensive metabolic panel (BMP + Alb, Alk Phos, ALT, AST, Total. Bili, TP)     TSH with free T4 reflex     OFFICE/OUTPT VISIT,EST,LEVL IV   4. Anemia, unspecified type  D64.9 CBC with platelets and differential     Ferritin     UA Macro with Reflex to Micro and Culture - lab collect     OFFICE/OUTPT VISIT,EST,LEVL IV   5. Lipid screening  Z13.220 Lipid panel reflex to direct LDL Fasting   6. Screening for diabetes mellitus  Z13.1 Comprehensive metabolic panel (BMP + Alb, Alk Phos, ALT, AST, Total. Bili, TP)   7. Tinea versicolor  B36.0 ketoconazole (NIZORAL) 2 % external shampoo     ketoconazole (NIZORAL) 2 % external cream     OFFICE/OUTPT VISIT,EST,LEVL IV   8. Subclinical hypothyroidism  E03.8 OFFICE/OUTPT VISIT,EST,LEVL IV   9. History of PCOS  Z87.42 OFFICE/OUTPT VISIT,EST,LEVL IV   10. Hx of appendicitis  Z87.19 OFFICE/OUTPT VISIT,EST,LEVL IV   11. Mass of left kidney  N28.89 UA Macro with Reflex to Micro and Culture - lab collect     OFFICE/OUTPT VISIT,EST,LEVL IV   will work-up  "fatigue with lab and urine and follow up as indicated   to rule out anemia, worsening thyroid issues, electrolyte imbalances and Vit D deficiency and will follow up as indicated.      Follow up with urology as planned, CT did appear concerning for renal cell and will notify if needs help for anxiety or counseling referral     Versicolor discussed and see patient instructions   Use either cream or shampoo daily for couple days after resolves    Patient has been advised of split billing requirements and indicates understanding: Yes    COUNSELING:  Reviewed preventive health counseling, as reflected in patient instructions    Estimated body mass index is 28.8 kg/m  as calculated from the following:    Height as of 7/13/22: 1.721 m (5' 7.75\").    Weight as of 7/13/22: 85.3 kg (188 lb).    Weight management plan: Discussed healthy diet and exercise guidelines    She reports that she quit smoking about 3 years ago. She smoked 0.00 packs per day for 0.00 years. She has never used smokeless tobacco.      Counseling Resources:  ATP IV Guidelines  Pooled Cohorts Equation Calculator  Breast Cancer Risk Calculator  BRCA-Related Cancer Risk Assessment: FHS-7 Tool  FRAX Risk Assessment  ICSI Preventive Guidelines  Dietary Guidelines for Americans, 2010  USDA's MyPlate  ASA Prophylaxis  Lung CA Screening    Giovanna Mistry, DANN [facility]  "

## 2022-08-17 NOTE — PATIENT INSTRUCTIONS
Use either the cream or the shampoo daily to the rash and it usually takes 3-4 weeks to kill this, use for a couple days after it clears.     If not clearing at that time, we'd do something by mouth    Preventive Health Recommendations  Female Ages 40 to 49    Yearly exam:   See your health care provider every year in order to  Review health changes.   Discuss preventive care.    Review your medicines if your doctor prescribed any.    Get a Pap test every three years (unless you have an abnormal result and your provider advises testing more often).    If you get Pap tests with HPV test, you only need to test every 5 years, unless you have an abnormal result. You do not need a Pap test if your uterus was removed (hysterectomy) and you have not had cancer.    You should be tested each year for STDs (sexually transmitted diseases), if you're at risk.   Ask your doctor if you should have a mammogram.    Have a colonoscopy (test for colon cancer) if someone in your family has had colon cancer or polyps before age 50.     Have a cholesterol test every 5 years.     Have a diabetes test (fasting glucose) after age 45. If you are at risk for diabetes, you should have this test every 3 years.    Shots: Get a flu shot each year. Get a tetanus shot every 10 years.     Nutrition:   Eat at least 5 servings of fruits and vegetables each day.  Eat whole-grain bread, whole-wheat pasta and brown rice instead of white grains and rice.  Get adequate Calcium and Vitamin D.      Lifestyle  Exercise at least 150 minutes a week (an average of 30 minutes a day, 5 days a week). This will help you control your weight and prevent disease.  Limit alcohol to one drink per day.  No smoking.   Wear sunscreen to prevent skin cancer.  See your dentist every six months for an exam and cleaning.

## 2022-08-18 ENCOUNTER — VIRTUAL VISIT (OUTPATIENT)
Dept: UROLOGY | Facility: CLINIC | Age: 42
End: 2022-08-18
Payer: COMMERCIAL

## 2022-08-18 DIAGNOSIS — C64.2 MALIGNANT NEOPLASM OF KIDNEY EXCLUDING RENAL PELVIS, LEFT (H): Primary | ICD-10-CM

## 2022-08-18 PROCEDURE — 99205 OFFICE O/P NEW HI 60 MIN: CPT | Mod: 95 | Performed by: UROLOGY

## 2022-08-18 RX ORDER — CEFAZOLIN SODIUM 2 G/50ML
2 SOLUTION INTRAVENOUS
Status: CANCELLED | OUTPATIENT
Start: 2022-08-18

## 2022-08-18 RX ORDER — GABAPENTIN 300 MG/1
300 CAPSULE ORAL
Status: CANCELLED | OUTPATIENT
Start: 2022-08-18

## 2022-08-18 RX ORDER — HEPARIN SODIUM 5000 [USP'U]/.5ML
5000 INJECTION, SOLUTION INTRAVENOUS; SUBCUTANEOUS
Status: CANCELLED | OUTPATIENT
Start: 2022-08-18

## 2022-08-18 RX ORDER — CEFAZOLIN SODIUM 2 G/50ML
2 SOLUTION INTRAVENOUS SEE ADMIN INSTRUCTIONS
Status: CANCELLED | OUTPATIENT
Start: 2022-08-18

## 2022-08-18 NOTE — PROGRESS NOTES
Caroline is a 41 year old who is being evaluated via a billable video visit.      How would you like to obtain your AVS? MyChart  If the video visit is dropped, the invitation should be resent by: Text to cell phone: [phone]  Will anyone else be joining your video visit? No        Video-Visit Details    Video Start Time: 130 pm     Type of service:  Video Visit    Urology Oncology Clinic   Renal mass             Chief Complaint:   Left renal mass            Consult or Referral:     Caroline Pride is a 41 year old female seen in consultation.         History of Present Illness:    Caroline Pride is a very pleasant 41 year old female who presented to [facility] with a ruptured appendicitis and phlegmon almost 2 month ago which was managed with IV antibiotic with appropriate response. During the work up, she was found to have a 5 cm left renal mass and therefore she was referred to me for further evaluation.     she does not have a history of previous abdominal or retroperitoneal surgery.  There is not a family history of renal cell cancer.  The patient does not have a history of smoking and currently is not smoking.    Her wife, Mila is also on the call. 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 They have two children     ECOG Performance Score: 0 - Independent           Past Medical History:     Past Medical History:   Diagnosis Date     Depressive disorder ongoing     PCOS (polycystic ovarian syndrome)      Subclinical hypothyroidism             Past Surgical History:   No past surgical history on file.         Problem List:     Patient Active Problem List    Diagnosis Date Noted     Tinea versicolor 08/17/2022     Priority: Medium     Fatigue, unspecified type 08/17/2022     Priority: Medium     Anemia, unspecified type 08/17/2022     Priority: Medium     Hx of appendicitis 08/17/2022     Priority: Medium     Perforated appendicitis 06/23/2022     Priority: Medium      Labor and delivery, indication for care 2020     Priority: Medium     AMA (advanced maternal age) primigravida 35+, first trimester 10/22/2019     Priority: Medium     10/30/19: 12w1d normal nuchal translucency, nasal bone visualized       Need for Tdap vaccination 10/08/2019     Priority: Medium     Subclinical hypothyroidism 2018     Priority: Medium     Vitamin D deficiency 2018     Priority: Medium     Other procreative management counseling and advice 2015     Priority: Medium     Generalized anxiety disorder 2014     Priority: Medium     Alcohol consumption binge drinking 2014     Priority: Medium     Irregular periods/menstrual cycles 2014     Priority: Medium     Non-celiac gluten sensitivity 2014     Priority: Medium     History of PCOS 2014     Priority: Medium     Formatting of this note might be different from the original.  Diagnosed in [place], has not been on treatment.              Medications     Current Outpatient Medications   Medication     IRON PO     ketoconazole (NIZORAL) 2 % external cream     ketoconazole (NIZORAL) 2 % external shampoo     Omega-3 Fatty Acids (FISH OIL PO)     valACYclovir (VALTREX) 1000 mg tablet     No current facility-administered medications for this visit.            Family History:     Family History   Problem Relation Age of Onset     Thyroid Disease Mother      Anemia Mother      Depression Father      Anxiety Disorder Father      Heart Disease Father      Hypertension Sister      Anxiety Disorder Sister      Thyroid Disease Sister      Breast Cancer Maternal Grandmother          at 42y     Parkinsonism Maternal Uncle      Hypertension Sister      Anxiety Disorder Sister      Thyroid Disease Sister             Social History:     Social History     Socioeconomic History     Marital status:      Spouse name: Not on file     Number of children: Not on file     Years of education: Not on file     Highest  education level: Not on file   Occupational History     Not on file   Tobacco Use     Smoking status: Former Smoker     Packs/day: 0.00     Years: 0.00     Pack years: 0.00     Quit date: 1/13/2019     Years since quitting: 3.5     Smokeless tobacco: Never Used   Substance and Sexual Activity     Alcohol use: Yes     Comment: occasional     Drug use: No     Sexual activity: Yes     Partners: Female   Other Topics Concern     Parent/sibling w/ CABG, MI or angioplasty before 65F 55M? No   Social History Narrative     Not on file     Social Determinants of Health     Financial Resource Strain: Not on file   Food Insecurity: Not on file   Transportation Needs: Not on file   Physical Activity: Not on file   Stress: Not on file   Social Connections: Not on file   Intimate Partner Violence: Not on file   Housing Stability: Not on file            Allergies:   Patient has no known allergies.         Review of Systems:  From intake questionnaire     Negative 14 system review except as noted on HPI, nurse's note see below.         Physical Exam:   Vitals:  No vitals were obtained today due to virtual visit.    Physical Exam   GENERAL: Healthy, alert and no distress  EYES: Eyes grossly normal to inspection.  No discharge or erythema, or obvious scleral/conjunctival abnormalities.  RESP: No audible wheeze, cough, or visible cyanosis.  No visible retractions or increased work of breathing.    SKIN: Visible skin clear. No significant rash, abnormal pigmentation or lesions.  NEURO: Cranial nerves grossly intact.  Mentation and speech appropriate for age.  PSYCH: Mentation appears normal, affect normal/bright, judgement and insight intact, normal speech and appearance well-groomed.          Labs and Pathology:    I personally reviewed all applicable laboratory and pathology data reviewed with patient  Significant for normal creatinine     Lab Results   Component Value Date    WBC 7.3 08/17/2022    WBC 11.4 05/13/2020     Lab Results    Component Value Date    RBC 4.37 08/17/2022    RBC 3.95 05/13/2020     Lab Results   Component Value Date    HGB 12.7 08/17/2022    HGB 10.9 05/15/2020     Lab Results   Component Value Date    HCT 37.9 08/17/2022    HCT 35.2 05/13/2020     No components found for: MCT  Lab Results   Component Value Date    MCV 87 08/17/2022    MCV 89 05/13/2020     Lab Results   Component Value Date    MCH 29.1 08/17/2022    MCH 30.4 05/13/2020     Lab Results   Component Value Date    MCHC 33.5 08/17/2022    MCHC 34.1 05/13/2020     Lab Results   Component Value Date    RDW 14.1 08/17/2022    RDW 13.9 05/13/2020     Lab Results   Component Value Date     08/17/2022     05/13/2020       Last Comprehensive Metabolic Panel:  Sodium   Date Value Ref Range Status   08/17/2022 137 133 - 144 mmol/L Final   12/06/2017 141 133 - 144 mmol/L Final     Potassium   Date Value Ref Range Status   08/17/2022 4.2 3.4 - 5.3 mmol/L Final   12/06/2017 4.2 3.4 - 5.3 mmol/L Final     Chloride   Date Value Ref Range Status   08/17/2022 108 94 - 109 mmol/L Final   12/06/2017 108 94 - 109 mmol/L Final     Carbon Dioxide   Date Value Ref Range Status   12/06/2017 24 20 - 32 mmol/L Final     Carbon Dioxide (CO2)   Date Value Ref Range Status   08/17/2022 21 20 - 32 mmol/L Final     Anion Gap   Date Value Ref Range Status   08/17/2022 8 3 - 14 mmol/L Final   12/06/2017 9 3 - 14 mmol/L Final     Glucose   Date Value Ref Range Status   08/17/2022 104 (H) 70 - 99 mg/dL Final   12/06/2017 112 (H) 70 - 99 mg/dL Final     Urea Nitrogen   Date Value Ref Range Status   08/17/2022 16 7 - 30 mg/dL Final   12/06/2017 13 7 - 30 mg/dL Final     Creatinine   Date Value Ref Range Status   08/17/2022 0.60 0.52 - 1.04 mg/dL Final   05/13/2020 0.48 (L) 0.52 - 1.04 mg/dL Final     GFR Estimate   Date Value Ref Range Status   08/17/2022 >90 >60 mL/min/1.73m2 Final     Comment:     Effective December 21, 2021 eGFRcr in adults is calculated using the 2021 CKD-EPI  creatinine equation which includes age and gender (Robin et al., NEJ, DOI: 10.1056/XLIArk6704947)   05/13/2020 >90 >60 mL/min/[1.73_m2] Final     Comment:     Non  GFR Calc  Starting 12/18/2018, serum creatinine based estimated GFR (eGFR) will be   calculated using the Chronic Kidney Disease Epidemiology Collaboration   (CKD-EPI) equation.       Calcium   Date Value Ref Range Status   08/17/2022 9.0 8.5 - 10.1 mg/dL Final   12/06/2017 9.0 8.5 - 10.1 mg/dL Final     Bilirubin Total   Date Value Ref Range Status   08/17/2022 0.4 0.2 - 1.3 mg/dL Final   12/06/2017 0.4 0.2 - 1.3 mg/dL Final     Alkaline Phosphatase   Date Value Ref Range Status   08/17/2022 57 40 - 150 U/L Final   12/06/2017 52 40 - 150 U/L Final     ALT   Date Value Ref Range Status   08/17/2022 20 0 - 50 U/L Final   05/13/2020 18 0 - 50 U/L Final     AST   Date Value Ref Range Status   08/17/2022 4 0 - 45 U/L Final   05/13/2020 5 0 - 45 U/L Final         INR   Date Value Ref Range Status   06/23/2022 1.27 (H) 0.85 - 1.15 Final     Comment:     Some International Normalized Ratio (INR) results performed at the [facility] for patients 6 months and older reported between 07/11/2021 and 5/17/2022 were evaluated against an outdated reference interval of 0.86-1.14 rather than the intended reference interval of 0.85-1.15. The INR value itself was accurate, but may not have been flagged correctly due to the outdated reference interval.          Imaging:    I personally viewed all applicable imaging and interpreted them and went over the results with the patient.  Mass/lesion details are as follows    The mass/lesion is located in the Left side and is primarily located in the lower pole.  The tumor is also primarily endophytic.  The mass/lesion primarily lies on the lateral surface.  With regard to the collecting system, the edge of the tumor arrives either abuts the collecting system or arrives within 4 mm of the collecting  system. Tumor appears to invade the renal sinus fat and the collecting system.         Outside and Past Medical records:    I spent 20 minutes reviewing outside and past medical records including her recent admission.          Assessment and Plan:     Assessment:  41 year old female with left renal mass with imaging evidence of invading the collecting system.     We had a long discussion regarding the management of this lesion. She understands that there is ~ 80 percent risk that this is a renal cell cancer. We discussed different management options including biopsy to confirm the histology of the tumor, cryoablation, partial and radical nephrectomy. The size of the tumor makes cryoablation a suboptimal treatment. Also, given the depth of tumor invasion, this is considered a T3 tumor and the best course of action would be left radical nephrectomy. She is young and healthy and could live a normal life with one kidney. She however understands that she would need to have a healthy lifestyle.     We also discussed genetic factors as the underlying cause for her tumor. I will place a referral for genetic counseling.     We reviewed the perioperative course for robotic left radical nephrectomy. Risks of the procedure including but not limited to infection, bleeding, damage to surrounding structures were reviewed. There is also a plan for lap appendectomy with Dr. Rey and Caroline is hoping that we could do both surgeries at the same time.      Lastly, we discussed the need for a CXR to complete the staging.         Plan:  CXR   Genetic Counseling   Schedule for left radical nephrectomy, preferably a combo case with Dr. Rey   Tentative plan for 2nd or 3rd week of September       60 total minutes spent on the date of the encounter including direct interaction with the patient, performing chart review, documentation and further activities as noted above      Luis Fernando Sigala MD   Department of Urology   [facility]  [place]       Video End Time:2:16 PM    Originating Location (pt. Location): Home    Distant Location (provider location):  [facility]     Platform used for Video Visit: Maria Elena Solano

## 2022-08-18 NOTE — LETTER
8/18/2022       RE: Caroline Pride  [address]  [facility] [address]     Dear Colleague,    Thank you for referring your patient, Caroline Pride, to the [facility] [place] at [facility]. Please see a copy of my visit note below.    Caroline is a 41 year old who is being evaluated via a billable video visit.      How would you like to obtain your AVS? MyChart  If the video visit is dropped, the invitation should be resent by: Text to cell phone: [phone]  Will anyone else be joining your video visit? No        Video-Visit Details    Video Start Time: 130 pm     Type of service:  Video Visit    Urology Oncology Clinic   Renal mass             Chief Complaint:   Left renal mass            Consult or Referral:     Caroline Pride is a 41 year old female seen in consultation.         History of Present Illness:    Caroline Pride is a very pleasant 41 year old female who presented to [facility] with a ruptured appendicitis and phlegmon almost 2 month ago which was managed with IV antibiotic with appropriate response. During the work up, she was found to have a 5 cm left renal mass and therefore she was referred to me for further evaluation.     she does not have a history of previous abdominal or retroperitoneal surgery.  There is not a family history of renal cell cancer.  The patient does not have a history of smoking and currently is not smoking.    Her wife, Mila is also on the call. 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 They have two children     ECOG Performance Score: 0 - Independent           Past Medical History:     Past Medical History:   Diagnosis Date     Depressive disorder ongoing     PCOS (polycystic ovarian syndrome)      Subclinical hypothyroidism             Past Surgical History:   No past surgical history on file.         Problem List:     Patient Active Problem  List    Diagnosis Date Noted     Tinea versicolor 2022     Priority: Medium     Fatigue, unspecified type 2022     Priority: Medium     Anemia, unspecified type 2022     Priority: Medium     Hx of appendicitis 2022     Priority: Medium     Perforated appendicitis 2022     Priority: Medium     Labor and delivery, indication for care 2020     Priority: Medium     AMA (advanced maternal age) primigravida 35+, first trimester 10/22/2019     Priority: Medium     10/30/19: 12w1d normal nuchal translucency, nasal bone visualized       Need for Tdap vaccination 10/08/2019     Priority: Medium     Subclinical hypothyroidism 2018     Priority: Medium     Vitamin D deficiency 2018     Priority: Medium     Other procreative management counseling and advice 2015     Priority: Medium     Generalized anxiety disorder 2014     Priority: Medium     Alcohol consumption binge drinking 2014     Priority: Medium     Irregular periods/menstrual cycles 2014     Priority: Medium     Non-celiac gluten sensitivity 2014     Priority: Medium     History of PCOS 2014     Priority: Medium     Formatting of this note might be different from the original.  Diagnosed in [place], has not been on treatment.              Medications     Current Outpatient Medications   Medication     IRON PO     ketoconazole (NIZORAL) 2 % external cream     ketoconazole (NIZORAL) 2 % external shampoo     Omega-3 Fatty Acids (FISH OIL PO)     valACYclovir (VALTREX) 1000 mg tablet     No current facility-administered medications for this visit.            Family History:     Family History   Problem Relation Age of Onset     Thyroid Disease Mother      Anemia Mother      Depression Father      Anxiety Disorder Father      Heart Disease Father      Hypertension Sister      Anxiety Disorder Sister      Thyroid Disease Sister      Breast Cancer Maternal Grandmother          at 42y      Parkinsonism Maternal Uncle      Hypertension Sister      Anxiety Disorder Sister      Thyroid Disease Sister             Social History:     Social History     Socioeconomic History     Marital status:      Spouse name: Not on file     Number of children: Not on file     Years of education: Not on file     Highest education level: Not on file   Occupational History     Not on file   Tobacco Use     Smoking status: Former Smoker     Packs/day: 0.00     Years: 0.00     Pack years: 0.00     Quit date: 1/13/2019     Years since quitting: 3.5     Smokeless tobacco: Never Used   Substance and Sexual Activity     Alcohol use: Yes     Comment: occasional     Drug use: No     Sexual activity: Yes     Partners: Female   Other Topics Concern     Parent/sibling w/ CABG, MI or angioplasty before 65F 55M? No   Social History Narrative     Not on file     Social Determinants of Health     Financial Resource Strain: Not on file   Food Insecurity: Not on file   Transportation Needs: Not on file   Physical Activity: Not on file   Stress: Not on file   Social Connections: Not on file   Intimate Partner Violence: Not on file   Housing Stability: Not on file            Allergies:   Patient has no known allergies.         Review of Systems:  From intake questionnaire     Negative 14 system review except as noted on HPI, nurse's note see below.         Physical Exam:   Vitals:  No vitals were obtained today due to virtual visit.    Physical Exam   GENERAL: Healthy, alert and no distress  EYES: Eyes grossly normal to inspection.  No discharge or erythema, or obvious scleral/conjunctival abnormalities.  RESP: No audible wheeze, cough, or visible cyanosis.  No visible retractions or increased work of breathing.    SKIN: Visible skin clear. No significant rash, abnormal pigmentation or lesions.  NEURO: Cranial nerves grossly intact.  Mentation and speech appropriate for age.  PSYCH: Mentation appears normal, affect normal/bright,  judgement and insight intact, normal speech and appearance well-groomed.          Labs and Pathology:    I personally reviewed all applicable laboratory and pathology data reviewed with patient  Significant for normal creatinine     Lab Results   Component Value Date    WBC 7.3 08/17/2022    WBC 11.4 05/13/2020     Lab Results   Component Value Date    RBC 4.37 08/17/2022    RBC 3.95 05/13/2020     Lab Results   Component Value Date    HGB 12.7 08/17/2022    HGB 10.9 05/15/2020     Lab Results   Component Value Date    HCT 37.9 08/17/2022    HCT 35.2 05/13/2020     No components found for: MCT  Lab Results   Component Value Date    MCV 87 08/17/2022    MCV 89 05/13/2020     Lab Results   Component Value Date    MCH 29.1 08/17/2022    MCH 30.4 05/13/2020     Lab Results   Component Value Date    MCHC 33.5 08/17/2022    MCHC 34.1 05/13/2020     Lab Results   Component Value Date    RDW 14.1 08/17/2022    RDW 13.9 05/13/2020     Lab Results   Component Value Date     08/17/2022     05/13/2020       Last Comprehensive Metabolic Panel:  Sodium   Date Value Ref Range Status   08/17/2022 137 133 - 144 mmol/L Final   12/06/2017 141 133 - 144 mmol/L Final     Potassium   Date Value Ref Range Status   08/17/2022 4.2 3.4 - 5.3 mmol/L Final   12/06/2017 4.2 3.4 - 5.3 mmol/L Final     Chloride   Date Value Ref Range Status   08/17/2022 108 94 - 109 mmol/L Final   12/06/2017 108 94 - 109 mmol/L Final     Carbon Dioxide   Date Value Ref Range Status   12/06/2017 24 20 - 32 mmol/L Final     Carbon Dioxide (CO2)   Date Value Ref Range Status   08/17/2022 21 20 - 32 mmol/L Final     Anion Gap   Date Value Ref Range Status   08/17/2022 8 3 - 14 mmol/L Final   12/06/2017 9 3 - 14 mmol/L Final     Glucose   Date Value Ref Range Status   08/17/2022 104 (H) 70 - 99 mg/dL Final   12/06/2017 112 (H) 70 - 99 mg/dL Final     Urea Nitrogen   Date Value Ref Range Status   08/17/2022 16 7 - 30 mg/dL Final   12/06/2017 13 7 - 30  mg/dL Final     Creatinine   Date Value Ref Range Status   08/17/2022 0.60 0.52 - 1.04 mg/dL Final   05/13/2020 0.48 (L) 0.52 - 1.04 mg/dL Final     GFR Estimate   Date Value Ref Range Status   08/17/2022 >90 >60 mL/min/1.73m2 Final     Comment:     Effective December 21, 2021 eGFRcr in adults is calculated using the 2021 CKD-EPI creatinine equation which includes age and gender (Robin et al., NE, DOI: 10.[facility]6/ACWHpw0465017)   05/13/2020 >90 >60 mL/min/[1.73_m2] Final     Comment:     Non  GFR Calc  Starting 12/18/2018, serum creatinine based estimated GFR (eGFR) will be   calculated using the Chronic Kidney Disease Epidemiology Collaboration   (CKD-EPI) equation.       Calcium   Date Value Ref Range Status   08/17/2022 9.0 8.5 - 10.1 mg/dL Final   12/06/2017 9.0 8.5 - 10.1 mg/dL Final     Bilirubin Total   Date Value Ref Range Status   08/17/2022 0.4 0.2 - 1.3 mg/dL Final   12/06/2017 0.4 0.2 - 1.3 mg/dL Final     Alkaline Phosphatase   Date Value Ref Range Status   08/17/2022 57 40 - 150 U/L Final   12/06/2017 52 40 - 150 U/L Final     ALT   Date Value Ref Range Status   08/17/2022 20 0 - 50 U/L Final   05/13/2020 18 0 - 50 U/L Final     AST   Date Value Ref Range Status   08/17/2022 4 0 - 45 U/L Final   05/13/2020 5 0 - 45 U/L Final         INR   Date Value Ref Range Status   06/23/2022 1.27 (H) 0.85 - 1.15 Final     Comment:     Some International Normalized Ratio (INR) results performed at the [facility] for patients 6 months and older reported between 07/11/2021 and 5/17/2022 were evaluated against an outdated reference interval of 0.86-1.14 rather than the intended reference interval of 0.85-1.15. The INR value itself was accurate, but may not have been flagged correctly due to the outdated reference interval.          Imaging:    I personally viewed all applicable imaging and interpreted them and went over the results with the patient.  Mass/lesion details are as  follows    The mass/lesion is located in the Left side and is primarily located in the lower pole.  The tumor is also primarily endophytic.  The mass/lesion primarily lies on the lateral surface.  With regard to the collecting system, the edge of the tumor arrives either abuts the collecting system or arrives within 4 mm of the collecting system. Tumor appears to invade the renal sinus fat and the collecting system.         Outside and Past Medical records:    I spent 20 minutes reviewing outside and past medical records including her recent admission.          Assessment and Plan:     Assessment:  41 year old female with left renal mass with imaging evidence of invading the collecting system.     We had a long discussion regarding the management of this lesion. She understands that there is ~ 80 percent risk that this is a renal cell cancer. We discussed different management options including biopsy to confirm the histology of the tumor, cryoablation, partial and radical nephrectomy. The size of the tumor makes cryoablation a suboptimal treatment. Also, given the depth of tumor invasion, this is considered a T3 tumor and the best course of action would be left radical nephrectomy. She is young and healthy and could live a normal life with one kidney. She however understands that she would need to have a healthy lifestyle.     We also discussed genetic factors as the underlying cause for her tumor. I will place a referral for genetic counseling.     We reviewed the perioperative course for robotic left radical nephrectomy. Risks of the procedure including but not limited to infection, bleeding, damage to surrounding structures were reviewed. There is also a plan for lap appendectomy with Dr. Rey and Caroline is hoping that we could do both surgeries at the same time.      Lastly, we discussed the need for a CXR to complete the staging.         Plan:  CXR   Genetic Counseling   Schedule for left radical nephrectomy,  preferably a combo case with Dr. Rey   Tentative plan for 2nd or 3rd week of September       60 total minutes spent on the date of the encounter including direct interaction with the patient, performing chart review, documentation and further activities as noted above      Luis Fernando Sigala MD   Department of Urology   [facility]     Video End Time:2:16 PM    Originating Location (pt. Location): Home    Distant Location (provider location):  [facility] [place]     Platform used for Video Visit: [employer]

## 2022-08-19 LAB
ALBUMIN UR-MCNC: NEGATIVE MG/DL
APPEARANCE UR: CLEAR
BILIRUB UR QL STRIP: NEGATIVE
BKR LAB AP GYN ADEQUACY: NORMAL
BKR LAB AP GYN INTERPRETATION: NORMAL
BKR LAB AP HPV REFLEX: NORMAL
BKR LAB AP PREVIOUS ABNORMAL: NORMAL
COLOR UR AUTO: YELLOW
GLUCOSE UR STRIP-MCNC: NEGATIVE MG/DL
HGB UR QL STRIP: NEGATIVE
HYALINE CASTS: 1 /LPF
KETONES UR STRIP-MCNC: NEGATIVE MG/DL
LEUKOCYTE ESTERASE UR QL STRIP: NEGATIVE
NITRATE UR QL: NEGATIVE
PATH REPORT.COMMENTS IMP SPEC: NORMAL
PATH REPORT.COMMENTS IMP SPEC: NORMAL
PATH REPORT.RELEVANT HX SPEC: NORMAL
PH UR STRIP: 5 [PH] (ref 5–7)
RBC URINE: 0 /HPF
SP GR UR STRIP: 1.02 (ref 1–1.03)
UROBILINOGEN UR STRIP-MCNC: NORMAL MG/DL
WBC URINE: <1 /HPF

## 2022-08-19 PROCEDURE — 87086 URINE CULTURE/COLONY COUNT: CPT | Performed by: UROLOGY

## 2022-08-19 PROCEDURE — 81001 URINALYSIS AUTO W/SCOPE: CPT | Performed by: PATHOLOGY

## 2022-08-20 LAB — BACTERIA UR CULT: NORMAL

## 2022-08-22 DIAGNOSIS — N28.89 LEFT RENAL MASS: Primary | ICD-10-CM

## 2022-08-22 LAB
HUMAN PAPILLOMA VIRUS 16 DNA: NEGATIVE
HUMAN PAPILLOMA VIRUS 18 DNA: NEGATIVE
HUMAN PAPILLOMA VIRUS FINAL DIAGNOSIS: NORMAL
HUMAN PAPILLOMA VIRUS OTHER HR: NEGATIVE

## 2022-08-23 ENCOUNTER — TELEPHONE (OUTPATIENT)
Dept: UROLOGY | Facility: CLINIC | Age: 42
End: 2022-08-23

## 2022-08-23 ENCOUNTER — PATIENT OUTREACH (OUTPATIENT)
Dept: ONCOLOGY | Facility: CLINIC | Age: 42
End: 2022-08-23

## 2022-08-23 DIAGNOSIS — N39.0 URINARY TRACT INFECTION: Primary | ICD-10-CM

## 2022-08-23 NOTE — TELEPHONE ENCOUNTER
FUTURE VISIT INFORMATION      SURGERY INFORMATION:    Date: 9/30/22    Location: uu or    Surgeon:  Luis Fernando Sigala MD Martin, David Thomas, MD    Anesthesia Type:  General    Procedure: NEPHRECTOMY, ROBOT-ASSISTED    Consult: virtual visit 8/18/22    RECORDS REQUESTED FROM:        Primary Care Provider: Giovanna Mistry APRN [facility]

## 2022-08-23 NOTE — TELEPHONE ENCOUNTER
Patient is scheduled for surgery with Dr. Sigala     Spoke with: Patient via phone     Date of Surgery: Friday September 30, 2022     Location: [place] OR      Informed patient they will need an adult : Yes     Pre-op: Yes      H&P: Patient to schedule with PCP     PAC EVAL: Friday September 09, 2022     Pre-procedure COVID-19 Test: Tuesday September 27, 2022 at [facility]     Post-op: Wednesday October 12, 2022    Additional imaging/appointments:     Additional comments:      Surgery packet: Sent via mail 8/23/22    Patient is aware that surgery time is tentative to change and to expect a call 3-1 business days from Pre Admission Nursing for instructions and arrival time

## 2022-08-26 ENCOUNTER — LAB (OUTPATIENT)
Dept: URGENT CARE | Facility: URGENT CARE | Age: 42
End: 2022-08-26
Attending: UROLOGY
Payer: COMMERCIAL

## 2022-08-26 LAB — SARS-COV-2 RNA RESP QL NAA+PROBE: POSITIVE

## 2022-08-26 PROCEDURE — U0005 INFEC AGEN DETEC AMPLI PROBE: HCPCS | Performed by: UROLOGY

## 2022-08-31 ENCOUNTER — PREP FOR PROCEDURE (OUTPATIENT)
Dept: SURGERY | Facility: CLINIC | Age: 42
End: 2022-08-31

## 2022-09-07 ENCOUNTER — VIRTUAL VISIT (OUTPATIENT)
Dept: ONCOLOGY | Facility: CLINIC | Age: 42
End: 2022-09-07
Attending: UROLOGY
Payer: COMMERCIAL

## 2022-09-07 ENCOUNTER — TELEPHONE (OUTPATIENT)
Dept: ONCOLOGY | Facility: CLINIC | Age: 42
End: 2022-09-07

## 2022-09-07 DIAGNOSIS — Z80.8 FAMILY HISTORY OF THYROID CANCER: ICD-10-CM

## 2022-09-07 DIAGNOSIS — Z80.42 FAMILY HISTORY OF PROSTATE CANCER: ICD-10-CM

## 2022-09-07 DIAGNOSIS — N28.89 LEFT RENAL MASS: ICD-10-CM

## 2022-09-07 DIAGNOSIS — Z80.3 FAMILY HISTORY OF MALIGNANT NEOPLASM OF BREAST: Primary | ICD-10-CM

## 2022-09-07 PROCEDURE — 96040 HC GENETIC COUNSELING, EACH 30 MINUTES: CPT | Mod: GT,95 | Performed by: GENETIC COUNSELOR, MS

## 2022-09-07 NOTE — PROGRESS NOTES
Caroline is a 41 year old who is being evaluated via a billable video visit.      How would you like to obtain your AVS? MyChart  If the video visit is dropped, the invitation should be resent by: Text to cell phone: [phone]  Will anyone else be joining your video visit? YES - Spouse (Mila)       Rachel Rincon VF        Video-Visit Details    Video Start Time: 2:11PM    Type of service:  Video Visit    Video End Time:2:50 PM    Originating Location (pt. Location): Home    Distant Location (provider location):  [facility]     Platform used for Video Visit: [employer]     9/7/2022    Referring Provider: Luis Fernando Sigala MD    Presenting Information:   I met with Caroline Pride and her spouse, Mila, today for genetic counseling at the Cancer Risk Management Program (virtual visit).  She is here today to review her family history, cancer screening recommendations, and available genetic testing options.    Personal History:  Caroline is a 41 year old female. She has surgery scheduled 9/31/2022 for a recently identified renal mass.  She had her first menstrual period at age 14 and first child at age 39.  She has her ovaries, fallopian tubes and uterus in place.      Family History: (Please see scanned pedigree for detailed family history information)    Caroline's mother is 69 and has a history of partial thyroidectomy    Her maternal grandmother was diagnosed with breast cancer at age 40 and passed away at age 44    One maternal uncle was diagnosed with thyroid cancer at age 18 and passed away at age 29    Her paternal grandfather was diagnosed with prostate cancer at age 70 and passed away at age 85    One paternal first cousin had a history of pituitary tumor    Discussion:    Caroline's family history of early onset breast cancer is suggestive of a possible hereditary cancer syndrome.Based on her maternal grandmother's early onset breast cancer, Caroline meets current National Comprehensive  Cancer Network (NCCN) criteria for genetic testing of BRCA1, BRCA2, and other high-moderate risk breast cancer susceptibility genes.      We reviewed the features of sporadic, familial, and hereditary cancers. A detailed handout regarding hereditary breast cancer and the information we discussed can be found in the after visit summary. Topics included: inheritance pattern, cancer risks, cancer screening recommendations, and also risks, benefits and limitations of testing.    We discussed that there are additional genes that could cause increased risk for other cancers, including renal. As many of these genes present with overlapping features in a family and accurate cancer risk cannot always be established based upon the pedigree analysis alone, it would be reasonable for Caroline to consider panel genetic testing to analyze multiple genes at once.    Caroline expressed interest in learning as much information as possible from testing, and elected to proceed with the 77 gene CancerNext-Expanded hereditary cancer panel through [employer].   Genetic testing is available for 77 genes associated with increased risk for many different cancers: CancerNext-Expanded (AIP, ALK, APC, SHEY, AXIN2, BAP1, BARD1, BLM, BMPR1A, BRCA1, BRCA2, BRIP1, CDC73, CDH1, CDK4, CDKN1B, CDKN2A, CHEK2, CTNNA1, DICER1, EGFR, EGLN1, EPCAM, FANCC, FH, FLCN, GALNT12, GREM1, HOXB13, KIF1B, KIT, LZTR1, MAX, MEN1, MET, MITF, MLH1, MSH2, MSH3, MSH6, MUTYH, NBN, NF1, NF2, NTHL1, PALB2, PDGFRA, PHOX2B, PMS2, POLD1, POLE, POT1, BIXGT9E, PTCH1, PTEN, RAD51C, RAD51D, RB1, RECQL, RET, SDHA, SDHAF2, SDHB, SDHC, SDHD, SMAD4, SMARCA4, SMARCB1, SMARCE1, STK11, SUFU, BWZL685, TP53, TSC1, TSC2, VHL, and XRCC2).  Verbal consent was obtained and genetic testing for CancerNext-Expanded will be sent to [facility]. Turn around time: 4 weeks.  A blood draw will be set up at the [facility]     Medical Management: For Caroline, we reviewed that the  information from genetic testing may determine:    additional cancer screening for which Caroline may qualify (i.e. mammogram and breast MRI, more frequent colonoscopies, more frequent dermatologic exams, etc.),    options for risk reducing surgeries Caroline could consider (i.e. bilateral mastectomy, surgery to remove her ovaries and/or uterus, etc.),      and targeted therapies for certain cancers in the future (i.e. immunotherapy for individuals with Solis syndrome, PARP inhibitors, etc.).     These recommendations and possible targeted therapies will be discussed in detail once genetic testing is completed.     Plan:  1) Today Caroline elected to proceed with genetic testing for the CancerNext-Expanded 77 gene hereditary cancer panel through [email or web address].  She will have her blood drawn for this testing.  2) This information should be available in 4- weeks.  3) Caroline will return to clinic to discuss the results.    Florina Messer MS, [facility]  Licensed, Certified Genetic Counselor  [facility]  Phone: [phone]

## 2022-09-08 NOTE — PATIENT INSTRUCTIONS
Assessing Cancer Risk  Only about 5-10% of cancers are thought to be due to an inherited cancer susceptibility gene.    These families often have:  Several people with the same or related types of cancer  Cancers diagnosed at a young age (before age 50)  Individuals with more than one primary cancer  Multiple generations of the family affected with cancer    Some people may be candidates for genetic testing of more than one gene.  For these families, genetic testing using a cancer panel may be offered.  These panels will test different genes known to increase the risk for breast, ovarian, uterine, and/or other cancers. All of the genes discussed below have published clinical management guidelines for individuals who are found to carry a mutation. The purpose of this handout is to serve as a brief summary of the genes analyzed by the panels used to inquire about hereditary breast and gynecologic cancer:  SHEY, BRCA1, BRCA2, BRIP1, CDH1, CHEK2, MLH1, MSH2, MSH6, PMS2, EPCAM, PTEN, PALB2, RAD51C, RAD51D, and TP53.  ______________________________________________________________________________  Hereditary Breast and Ovarian Cancer Syndrome   (BRCA1 and BRCA2)  A single mutation in one of the copies of BRCA1 or BRCA2 increases the risk for breast and ovarian cancer, among others.  The risk for pancreatic cancer and melanoma may also be slightly increased in some families.  The chart below shows the chance that someone with a BRCA mutation would develop cancer in his or her lifetime1,2,3,4.        A person s ethnic background is also important to consider, as individuals of Ashkenazi Rastafarian ancestry have a higher chance of having a BRCA gene mutation.  There are three BRCA mutations that occur more frequently in this population.    Solis Syndrome   (MLH1, MSH2, MSH6, PMS2, and EPCAM)  Currently five genes are known to cause Solis Syndrome: MLH1, MSH2, MSH6, PMS2, and EPCAM.  A single mutation in one of the Solis  Syndrome genes increases the risk for colon, endometrial, ovarian, and stomach cancers.  Other cancers that occur less commonly in Solis Syndrome include urinary tract, skin, and brain cancers.  The chart below shows the chance that a person with Solis syndrome would develop cancer in his or her lifetime5.      *Cancer risk varies depending on Solis syndrome gene found    Cowden Syndrome   (PTEN)  Cowden syndrome is a hereditary condition that increases the risk for breast, thyroid, endometrial, colon, and kidney cancer.  Cowden syndrome is caused by a mutation in the PTEN gene.  A single mutation in one of the copies of PTEN causes Cowden syndrome and increases cancer risk.  The chart below shows the chance that someone with a PTEN mutation would develop cancer in their lifetime6,7.  Other benign features seen in some individuals with Cowden syndrome include benign skin lesions (facial papules, keratoses, lipomas), learning disability, autism, thyroid nodules, colon polyps, and larger head size.      *One recent study found breast cancer risk to be increased to 85%    Li-Fraumeni Syndrome   (TP53)  Li-Fraumeni Syndrome (LFS) is a cancer predisposition syndrome caused by a mutation in the TP53 gene. A single mutation in one of the copies of TP53 increases the risk for multiple cancers. Individuals with LFS are at an increased risk for developing cancer at a young age. The lifetime risk for development of a LFS-associated cancer is 50% by age 30 and 90% by age 60.   Core Cancers: Sarcomas, Breast, Brain, Lung, Leukemias/Lymphomas, Adrenocortical carcinomas  Other Cancers: Gastrointestinal, Thyroid, Skin, Genitourinary    Hereditary Diffuse Gastric Cancer   (CDH1)  Currently, one gene is known to cause hereditary diffuse gastric cancer (HDGC): CDH1.  Individuals with HDGC are at increased risk for diffuse gastric cancer and lobular breast cancer. Of people diagnosed with HDGC, 30-50% have a mutation in the CDH1 gene.   This suggests there are likely other genes that may cause HDGC that have not been identified yet.      Lifetime Cancer Risks    General Population HDGC    Diffuse Gastric  <1% ~80%   Breast 12% 39-52%         Additional Genes  SHEY  SHEY is a moderate-risk breast cancer gene. Women who have a mutation in SHEY can have between a 2-4 fold increased risk for breast cancer compared to the general population8. SHEY mutations have also been associated with increased risk for pancreatic cancer, however an estimate of this cancer risk is not well understood9. Individuals who inherit two SHEY mutations have a condition called ataxia-telangiectasia (AT).  This rare autosomal recessive condition affects the nervous system and immune system, and is associated with progressive cerebellar ataxia beginning in childhood.  Individuals with ataxia-telangiectasia often have a weakened immune system and have an increased risk for childhood cancers.    PALB2  Mutations in PALB2 have been shown to increase the risk of breast cancer up to 33-58% in some families; where individuals fall within this risk range is dependent upon family [id number]. PALB2 mutations have also been associated with increased risk for pancreatic cancer, although this risk has not been quantified yet.  Individuals who inherit two PALB2 mutations--one from their mother and one from their father--have a condition called Fanconi Anemia.  This rare autosomal recessive condition is associated with short stature, developmental delay, bone marrow failure, and increased risk for childhood cancers.    CHEK2   CHEK2 is a moderate-risk breast cancer gene.  Women who have a mutation in CHEK2 have around a 2-fold increased risk for breast cancer compared to the general population, and this risk may be higher depending upon family history.11,12,13 Mutations in CHEK2 have also been shown to increase the risk of a number of other cancers, including colon and prostate, however these  cancer risks are currently not well understood.    BRIP1, RAD51C and RAD51D  Mutations in BRIP1, RAD51C, and RAD51D have been shown to increase the risk of ovarian cancer and possibly female breast cancer as well14,15 .       Lifetime Cancer Risk    General Population BRIP1 RAD51C RAD51D   Ovarian 1-2% ~5-8% ~5-9% ~7-15%           Inheritance  All of the cancer syndromes reviewed above are inherited in an autosomal dominant pattern.  This means that if a parent has a mutation, each of his or her children will have a 50% chance of inheriting that same mutation.  Therefore, each child--male or female--would have a 50% chance of being at increased risk for developing cancer.      Image obtained from Genetics Home Reference, 2013     Mutations in some genes can occur de mary, which means that a person s mutation occurred for the first time in them and was not inherited from a parent.  Now that they have the mutation, however, it can be passed on to future generations.    Genetic Testing  Genetic testing involves a blood test and will look at the genetic information in the SHEY, BRCA1, BRCA2, BRIP1, CDH1, CHEK2, MLH1, MSH2, MSH6, PMS2, EPCAM, PTEN, PALB2, RAD51C, RAD51D, and TP53 genes for any harmful mutations that are associated with increased cancer risk.  If possible, it is recommended that the person(s) who has had cancer be tested before other family members.  That person will give us the most useful information about whether or not a specific gene is associated with the cancer in the family.    Results  There are three possible results of genetic testing:  Positive--a harmful mutation was identified in one or more of the genes  Negative--no mutation was identified in any of the genes on this panel  Variant of unknown significance--a variation in one of the genes was identified, but it is unclear how this impacts cancer risk in the family    Advantages and Disadvantages   There are advantages and disadvantages to  genetic testing.    Advantages  May clarify your cancer risk  Can help you make medical decisions  May explain the cancers in your family  May give useful information to your family members (if you share your results)    Disadvantages  Possible negative emotional impact of learning about inherited cancer risk  Uncertainty in interpreting a negative test result in some situations  Possible genetic discrimination concerns (see below)    Genetic Information Nondiscrimination Act (BENJI)  BENJI is a federal law that protects individuals from health insurance or employment discrimination based on a genetic test result alone.  Although rare, there are currently no legal discrimination protections in terms of life insurance, long term care, or disability insurances.  Visit the [employer] website to learn more.    Reducing Cancer Risk  All of the genes described above have nationally recognized cancer screening guidelines that would be recommended for individuals who test positive.  In addition to increased cancer screening, surgeries may be offered or recommended to reduce cancer risk.  Recommendations are based upon an individual s genetic test result as well as their personal and family history of cancer.    Questions to Think About Regarding Genetic Testing:  What effect will the test result have on me and my relationship with my family members if I have an inherited gene mutation?  If I don t have a gene mutation?  Should I share my test results, and how will my family react to this news, which may also affect them?  Are my children ready to learn new information that may one day affect their own health?    Hereditary Cancer Resources    FORCE: Facing Our Risk of Cancer Empowered facingourrisk.org   Bright Pink bebrightpink.org   Li-Fraumeni Syndrome Association lfsassociation.org   PTEN World PTENworld.[facility]   No stomach for cancer, Inc. nostomachforcancer.org   Stomach cancer relief network  Scrnet.org   Collaborative Group of the Americas on Inherited Colorectal Cancer (CGA) cgaicc.com    Cancer Care cancercare.org   American Cancer Society (ACS) cancer.org   National Cancer [place] (NCI) cancer.gov     Please call us if you have any questions or concerns.   Cancer Risk Management Program [phone]-[facility] ([phone])  Bravo Hernandez, [place] [facility]  [phone]  Malorie Payam, MS, [facility] [phone]  Stefania Saunders, MS, [facility]  [phone]  Florina Messer, MS, [facility]  [phone]  Danika Candido, MS, [facility]  [phone]  Cira Boo, MS, [facility] [phone]  Karley Parker, MS, [facility] [phone]    References  Josh Bryant PDP, Chai S, Talat STORM, Destiny JE, Ewa JL, Antonia N, Ole H, Torito O, Carlos A, Amada B, Sue P, Maneric S, Virgil DM, Eduard N, Tamie E, Sue H, Reyes E, Lubinski J, Gronwald J, Kehinde B, Russ H, Thorlacius S, Eerola H, Reynaldo H, Gerald K, Jay OP. Average risks of breast and ovarian cancer associated with BRCA1 or BRCA2 mutations detected in case series unselected for family history: a combined analysis of 222 studies. Am J Hum Pamela. 2003;72:1117-30.  Angelique N, Belkys M, Tsering G.  BRCA1 and BRCA2 Hereditary Breast and Ovarian Cancer. Gene Reviews online. 2013.  Glen YC, Gerardo S, Sarah G, Plascencia S. Breast cancer risk among male BRCA1 and BRCA2 mutation carriers. J Natl Cancer Inst. 2007;99:1811-4.  Thai SCHMIDT, Dora I, Orville J, Isabell E, Kaylah ER, Stacia F. Risk of breast cancer in male BRCA2 carriers. J Med Pamela. 2010;47:710-1.  National Comprehensive Cancer Network. Clinical practice guidelines in oncology, colorectal cancer screening. Available online (registration required). 2015.  Robe JORDAN, Jeff J, Danelle J, Beth LA, Vivi MS, Eng C. Lifetime cancer risks in individuals with germline PTEN mutations. Clin Cancer Res. 2012;18:400-7.  Lashae VÁZQUEZ. Cowden Syndrome: A Critical Review of the Clinical Literature. J Pamela .  2009:18:13-27.  Marbella A, Hossein D, Jeanine S, Chary P, Vignesh T, Oma M, Roni B, Billy H, Alma Rosa R, Micah K, Erika L, Thai DG, Virgil D, David DF, Julio MR, The Breast Cancer Susceptibility Collaboration () & Jay DAVIS. SHEY mutations that cause ataxia-telangiectasia are breast cancer susceptibility alleles. Nature Genetics. 2006;38:873-875  Richard N , Jermaine Y, Tracie J, Mikala L, Zofia GM , Kathia ML, Gallinger S, English AG, Syngal S, Heather ML, May J , Lisette R, Giovani SZ, Tory JR, Yadira VE, Denins M, Volucero B, Cherise N, Juliano RH, Lidya KW, and Jason AP. SHEY mutations in patients with hereditary pancreatic cancer. Cancer Discover. 2012;2:41-46  Madhavi ADAME., et al. Breast-Cancer Risk in Families with Mutations in PALB2. NEJM. 2014; 371(6):497-506.  CHEK2 Breast Cancer Case-Control Consortium. CHEK2*1100delC and susceptibility to breast cancer: A collaborative analysis involving 10,860 breast cancer cases and 9,065 controls from 10 studies. Am J Hum Pamela, 74 (2004), pp. 8561-3010  Jose T, Abbie S, Chela K, et al. Spectrum of Mutations in BRCA1, BRCA2, CHEK2, and TP53 in Families at High Risk of Breast Cancer. JAVON. 2006;295(12):0714-0615.   Sergio C, Jovanny D, Frances A, et al. Risk of breast cancer in women with a CHEK2 mutation with and without a family history of breast cancer. J Clin Oncol. 2011;29:9882-9861.  Simone H, Jaime E, Faith SJ, et al. Contribution of germline mutations in the RAD51B, RAD51C, and RAD51D genes to ovarian cancer in the population. J Clin Oncol. 2015;33(26):4117-0233. Doi:10.1200/JCO.2015.61.2408.  Kim Pantojaon DF, Ulises P, et al. Mutations in BRIP1 confer high risk of ovarian cancer. Annette Pamela. 2011;43(11):7162-5099. doi:10.1038/ng.955.

## 2022-09-09 ENCOUNTER — PREP FOR PROCEDURE (OUTPATIENT)
Dept: SURGERY | Facility: CLINIC | Age: 42
End: 2022-09-09

## 2022-09-09 ENCOUNTER — PRE VISIT (OUTPATIENT)
Dept: SURGERY | Facility: CLINIC | Age: 42
End: 2022-09-09

## 2022-09-09 ENCOUNTER — VIRTUAL VISIT (OUTPATIENT)
Dept: SURGERY | Facility: CLINIC | Age: 42
End: 2022-09-09
Payer: COMMERCIAL

## 2022-09-09 ENCOUNTER — PATIENT OUTREACH (OUTPATIENT)
Dept: SURGERY | Facility: CLINIC | Age: 42
End: 2022-09-09

## 2022-09-09 ENCOUNTER — ANESTHESIA EVENT (OUTPATIENT)
Dept: SURGERY | Facility: CLINIC | Age: 42
End: 2022-09-09

## 2022-09-09 DIAGNOSIS — Z01.818 PREOP EXAMINATION: Primary | ICD-10-CM

## 2022-09-09 DIAGNOSIS — C64.2 MALIGNANT NEOPLASM OF LEFT KIDNEY (H): ICD-10-CM

## 2022-09-09 PROCEDURE — 99204 OFFICE O/P NEW MOD 45 MIN: CPT | Mod: 95 | Performed by: NURSE PRACTITIONER

## 2022-09-09 ASSESSMENT — PAIN SCALES - GENERAL: PAINLEVEL: NO PAIN (0)

## 2022-09-09 ASSESSMENT — LIFESTYLE VARIABLES: TOBACCO_USE: 1

## 2022-09-09 NOTE — H&P (VIEW-ONLY)
"  Pre-Operative H & P     CC:  Preoperative exam to assess for increased cardiopulmonary risk while undergoing surgery and anesthesia.    Date of Encounter: 9/9/2022  Primary Care Physician:  Giovanna Mistry     Reason for visit:   Encounter Diagnoses   Name Primary?     Malignant neoplasm of left kidney (H)      Preop examination Yes       HPI  Caroline Pride is a 41 year old female who presents for pre-operative H & P in preparation for  Procedure Information     Date/Time: 9/30/22     Procedure: NEPHRECTOMY, ROBOT-ASSISTED    Anesthesia type: general     Pre-op diagnosis: Malignant neoplasm of kidney excluding renal pelvis, left (H)    Location: [facility]    Providers: Zakiya and Candido          Caroline Pride has a past medical history for anxiety, subclinical hypothyroid, anemia, and h/o perforated appendix.      Ms. Pride was seen by Dr. Sigala on 8/18/22 in urology oncology consultation for left renal mass with imaging evidence of invading the collecting system.  This was an incidental finding on imaging when she presented earlier this summer with a perforated appendix.  Dr. Sigala counseled the patient on her diagnosis and treatment options. Per Dr. Sigala's 8/18/22 note,\" There is also a plan for lap appendectomy with Dr. Rey and Caroline is hoping that we could do both surgeries at the same time\".  Ms. Pride presents to the PAC virtually today with her spouse who is also named Caroline,  in preparation for the above procedure.      History is obtained from the patient and patient's spouse and chart review    Hx of abnormal bleeding or anti-platelet use: denies    Menstrual history: Patient's last menstrual period was 08/31/2022 (approximate).:      Past Medical History  Past Medical History:   Diagnosis Date     Depressive disorder ongoing     PCOS (polycystic ovarian syndrome)      Subclinical hypothyroidism        Past Surgical " History  History reviewed. No pertinent surgical history.    Prior to Admission Medications  Current Outpatient Medications   Medication Sig Dispense Refill     IRON PO  (Patient not taking: Reported on 2022)       ketoconazole (NIZORAL) 2 % external cream Apply topically daily 60 g 1     Omega-3 Fatty Acids (FISH OIL PO)  (Patient not taking: Reported on 2022)       valACYclovir (VALTREX) 1000 mg tablet Take 2 tablets (2,000 mg) by mouth 2 times daily (Patient taking differently: Take 2,000 mg by mouth as needed) 12 tablet 6       Allergies  No Known Allergies    Social History  Social History     Socioeconomic History     Marital status:      Spouse name: Not on file     Number of children: Not on file     Years of education: Not on file     Highest education level: Not on file   Occupational History     Not on file   Tobacco Use     Smoking status: Former Smoker     Packs/day: 0.50     Years: 2.00     Pack years: 1.00     Quit date: 2019     Years since quitting: 3.6     Smokeless tobacco: Never Used   Substance and Sexual Activity     Alcohol use: Yes     Comment: occasional     Drug use: No     Sexual activity: Yes     Partners: Female   Other Topics Concern     Parent/sibling w/ CABG, MI or angioplasty before 65F 55M? No   Social History Narrative     Not on file     Social Determinants of Health     Financial Resource Strain: Not on file   Food Insecurity: Not on file   Transportation Needs: Not on file   Physical Activity: Not on file   Stress: Not on file   Social Connections: Not on file   Intimate Partner Violence: Not on file   Housing Stability: Not on file       Family History  Family History   Problem Relation Age of Onset     Thyroid Disease Mother      Anemia Mother      Depression Father      Anxiety Disorder Father      Heart Disease Father      Breast Cancer Maternal Grandmother          at 42y     Hypertension Sister      Anxiety Disorder Sister      Thyroid Disease  Sister      Hypertension Sister      Anxiety Disorder Sister      Thyroid Disease Sister      Parkinsonism Maternal Uncle        Review of Systems  The complete review of systems is negative other than noted in the HPI or here.   Anesthesia Evaluation   Pt has not had prior anesthetic         ROS/MED HX  ENT/Pulmonary:     (+) tobacco use (light smoker for a couple years. ), Past use,     Neurologic:  - neg neurologic ROS     Cardiovascular: Comment: Denies cardiac symptoms including chest pain, SOB, palpitations, syncope, CHEUNG, orthopnea, or PND.   - neg cardiovascular ROS     METS/Exercise Tolerance: >4 METS Comment: Two children five and under - keeps busy chasing after kids.    Hematologic:     (+) anemia,     Musculoskeletal:  - neg musculoskeletal ROS     GI/Hepatic: Comment: H/o perforated appendix.       Renal/Genitourinary:  - neg Renal ROS     Endo:     (+) thyroid problem,  h/o subclinical hypothyroid,     Psychiatric/Substance Use:     (+) psychiatric history (H/o anxiety in the past. ) anxiety  (-) alcohol abuse history and chronic opioid use history   Infectious Disease: Comment: COVID (+) 8/26/2022 with mild symptoms of fatigue, lost of sense of smell and congestion. No residual symptoms.     Completed COVID  Vaccine and booster.  - neg infectious disease ROS     Malignancy:   (+) Malignancy, History of Other.Other CA Malignant neoplasm of kidney excluding renal pelvis, left (H) Active status post.    Other: Comment: Patient has never had anesthesia.  Reports her mom has PONV and get post-op migraines from anesthesia.            Virtual visit -  No vitals were obtained    Physical Exam  Constitutional: Awake, alert, cooperative, no apparent distress, and appears stated age.  Eyes: Pupils equal  HENT: Normocephalic  Respiratory: non labored breathing   Neurologic: Awake, alert, oriented to name, place and time.   Neuropsychiatric: Calm, cooperative. Normal affect.      Prior Labs/Diagnostic Studies    All labs and imaging personally reviewed   Lab Results   Component Value Date    WBC 7.3 08/17/2022    WBC 11.4 05/13/2020     Lab Results   Component Value Date    RBC 4.37 08/17/2022    RBC 3.95 05/13/2020     Lab Results   Component Value Date    HGB 12.7 08/17/2022    HGB 10.9 05/15/2020     Lab Results   Component Value Date    HCT 37.9 08/17/2022    HCT 35.2 05/13/2020     Lab Results   Component Value Date    MCV 87 08/17/2022    MCV 89 05/13/2020     Lab Results   Component Value Date    MCH 29.1 08/17/2022    MCH 30.4 05/13/2020     Lab Results   Component Value Date    MCHC 33.5 08/17/2022    MCHC 34.1 05/13/2020     Lab Results   Component Value Date    RDW 14.1 08/17/2022    RDW 13.9 05/13/2020     Lab Results   Component Value Date     08/17/2022     05/13/2020       Last Comprehensive Metabolic Panel:  Sodium   Date Value Ref Range Status   08/17/2022 137 133 - 144 mmol/L Final   12/06/2017 141 133 - 144 mmol/L Final     Potassium   Date Value Ref Range Status   08/17/2022 4.2 3.4 - 5.3 mmol/L Final   12/06/2017 4.2 3.4 - 5.3 mmol/L Final     Chloride   Date Value Ref Range Status   08/17/2022 108 94 - 109 mmol/L Final   12/06/2017 108 94 - 109 mmol/L Final     Carbon Dioxide   Date Value Ref Range Status   12/06/2017 24 20 - 32 mmol/L Final     Carbon Dioxide (CO2)   Date Value Ref Range Status   08/17/2022 21 20 - 32 mmol/L Final     Anion Gap   Date Value Ref Range Status   08/17/2022 8 3 - 14 mmol/L Final   12/06/2017 9 3 - 14 mmol/L Final     Glucose   Date Value Ref Range Status   08/17/2022 104 (H) 70 - 99 mg/dL Final   12/06/2017 112 (H) 70 - 99 mg/dL Final     Urea Nitrogen   Date Value Ref Range Status   08/17/2022 16 7 - 30 mg/dL Final   12/06/2017 13 7 - 30 mg/dL Final     Creatinine   Date Value Ref Range Status   08/17/2022 0.60 0.52 - 1.04 mg/dL Final   05/13/2020 0.48 (L) 0.52 - 1.04 mg/dL Final     GFR Estimate   Date Value Ref Range Status   08/17/2022 >90 >60  mL/min/1.73m2 Final     Comment:     Effective December 21, 2021 eGFRcr in adults is calculated using the 2021 CKD-EPI creatinine equation which includes age and gender (Robin et al., NEJ, DOI: 10.1056/XZYYkp2641481)   05/13/2020 >90 >60 mL/min/[1.73_m2] Final     Comment:     Non  GFR Calc  Starting 12/18/2018, serum creatinine based estimated GFR (eGFR) will be   calculated using the Chronic Kidney Disease Epidemiology Collaboration   (CKD-EPI) equation.       Calcium   Date Value Ref Range Status   08/17/2022 9.0 8.5 - 10.1 mg/dL Final   12/06/2017 9.0 8.5 - 10.1 mg/dL Final         The patient's records and results personally reviewed by this provider.     Outside records reviewed from: Care Everywhere      Assessment  Caroline Pride is a 41 year old female seen as a PAC referral for risk assessment and optimization for anesthesia.    Plan/Recommendations  Pt will be optimized for the proposed procedure.  See below for details on the assessment, risk, and preoperative recommendations    NEUROLOGY  - No history of TIA, CVA or seizure  - Denies chronic pain  -Post Op delirium risk factors:  No risk identified    ENT  - No current airway concerns.  Will need to be reassessed day of surgery.  Mallampati: Unable to assess  TM: Unable to assess    CARDIAC  - Denies known coronary artery disease.  Denies cardiac symptoms.  - METS (Metabolic Equivalents) >4 without symptoms.   - RCRI-Very low risk: Class 1 0.4% complication rate            Total Score: 0        PULMONARY  ALINE Low Risk            Total Score: 1    ALINE: Often tired      - Denies asthma or inhaler use  - Tobacco History  History   Smoking Status     Former Smoker     Packs/day: 0.50     Years: 2.00     Quit date: 1/13/2019   Smokeless Tobacco     Never Used       GI  - Denies GERD  PONV High Risk  Total Score: 3           1 AN PONV: Pt is Female    1 AN PONV: Patient is not a current smoker    1 AN PONV: Intended Post Op Opioids     "    /RENAL  - Malignant neoplasm of kidney excluding renal pelvis, left (H)   ~ incidental finding on imaging this past June when patient presented with perforated appendix.     ~ above procedure planned   ~ preoperative labs ordered.   - Baseline Creatinine  0.60    ENDOCRINE    - BMI: Estimated body mass index is 29.65 kg/m  as calculated from the following:    Height as of 8/17/22: 1.727 m (5' 8\").    Weight as of 8/17/22: 88.5 kg (195 lb).  Overweight (BMI 25.0-29.9)  - subclinical hypothyroid    HEME  VTE Medium Risk 1.8%            Total Score: 5    VTE: Current cancer      - No history of abnormal bleeding or antiplatelet use.  - Chronic iron deficient  anemia  Recommend perioperative use of blood conservation techniques intraoperatively and close monitoring for postoperative bleeding.  A type and screen has been ordered for this patient    ID  - COVID (+) 8/26/22 with mild symptoms.  Denies residual. Symptoms.       The patient is optimized for their procedure. AVS with information on surgery time/arrival time, meds and NPO status given by nursing staff. No further diagnostic testing indicated.    Please refer to the physical examination documented by the anesthesiologist in the anesthesia record on the day of surgery.    Video-Visit Details    Type of service:  Video Visit    Patient verbally consented to video service today: YES    Video Start Time: 12:30  Video End Time (time video stopped): 12:47    Originating Location (pt. Location): Home    Distant Location (provider location):  [facility] PREOPERATIVE ASSESS[facility]     Mode of Communication:  Video Conference via AmWell  On the day of service:     Prep time: 12 minutes  Visit time: 17 minutes  Documentation time: 20 minutes  ------------------------------------------  Total time: 49 minutes      DANN Judge CNP  Preoperative Assessment Center  [facility]  Clinic and Surgery Center  Phone: [phone]  Fax: " [phone]

## 2022-09-09 NOTE — PROGRESS NOTES
Caroline is a 41 year old who is being evaluated via a billable video visit.      How would you like to obtain your AVS? MyChart    HPI         Review of Systems         Objective    Vitals - Patient Reported  Pain Score: No Pain (0)        Physical Exam     SUSAN Clark LPN

## 2022-09-09 NOTE — H&P
"  Pre-Operative H & P     CC:  Preoperative exam to assess for increased cardiopulmonary risk while undergoing surgery and anesthesia.    Date of Encounter: 9/9/2022  Primary Care Physician:  Giovanna Mistry     Reason for visit:   Encounter Diagnoses   Name Primary?     Malignant neoplasm of left kidney (H)      Preop examination Yes       HPI  Caroline Pride is a 41 year old female who presents for pre-operative H & P in preparation for  Procedure Information     Date/Time: 9/30/22     Procedure: NEPHRECTOMY, ROBOT-ASSISTED    Anesthesia type: general     Pre-op diagnosis: Malignant neoplasm of kidney excluding renal pelvis, left (H)    Location: [facility]    Providers: Zakiya and Cadnido          Caroline Pride has a past medical history for anxiety, subclinical hypothyroid, anemia, and h/o perforated appendix.      Ms. Pride was seen by Dr. Sigala on 8/18/22 in urology oncology consultation for left renal mass with imaging evidence of invading the collecting system.  This was an incidental finding on imaging when she presented earlier this summer with a perforated appendix.  Dr. Sigala counseled the patient on her diagnosis and treatment options. Per Dr. Sigala's 8/18/22 note,\" There is also a plan for lap appendectomy with Dr. Rey and Caroline is hoping that we could do both surgeries at the same time\".  Ms. Pride presents to the PAC virtually today with her spouse who is also named Caroline,  in preparation for the above procedure.      History is obtained from the patient and patient's spouse and chart review    Hx of abnormal bleeding or anti-platelet use: denies    Menstrual history: Patient's last menstrual period was 08/31/2022 (approximate).:      Past Medical History  Past Medical History:   Diagnosis Date     Depressive disorder ongoing     PCOS (polycystic ovarian syndrome)      Subclinical hypothyroidism        Past Surgical " History  History reviewed. No pertinent surgical history.    Prior to Admission Medications  Current Outpatient Medications   Medication Sig Dispense Refill     IRON PO  (Patient not taking: Reported on 2022)       ketoconazole (NIZORAL) 2 % external cream Apply topically daily 60 g 1     Omega-3 Fatty Acids (FISH OIL PO)  (Patient not taking: Reported on 2022)       valACYclovir (VALTREX) 1000 mg tablet Take 2 tablets (2,000 mg) by mouth 2 times daily (Patient taking differently: Take 2,000 mg by mouth as needed) 12 tablet 6       Allergies  No Known Allergies    Social History  Social History     Socioeconomic History     Marital status:      Spouse name: Not on file     Number of children: Not on file     Years of education: Not on file     Highest education level: Not on file   Occupational History     Not on file   Tobacco Use     Smoking status: Former Smoker     Packs/day: 0.50     Years: 2.00     Pack years: 1.00     Quit date: 2019     Years since quitting: 3.6     Smokeless tobacco: Never Used   Substance and Sexual Activity     Alcohol use: Yes     Comment: occasional     Drug use: No     Sexual activity: Yes     Partners: Female   Other Topics Concern     Parent/sibling w/ CABG, MI or angioplasty before 65F 55M? No   Social History Narrative     Not on file     Social Determinants of Health     Financial Resource Strain: Not on file   Food Insecurity: Not on file   Transportation Needs: Not on file   Physical Activity: Not on file   Stress: Not on file   Social Connections: Not on file   Intimate Partner Violence: Not on file   Housing Stability: Not on file       Family History  Family History   Problem Relation Age of Onset     Thyroid Disease Mother      Anemia Mother      Depression Father      Anxiety Disorder Father      Heart Disease Father      Breast Cancer Maternal Grandmother          at 42y     Hypertension Sister      Anxiety Disorder Sister      Thyroid Disease  Sister      Hypertension Sister      Anxiety Disorder Sister      Thyroid Disease Sister      Parkinsonism Maternal Uncle        Review of Systems  The complete review of systems is negative other than noted in the HPI or here.   Anesthesia Evaluation   Pt has not had prior anesthetic         ROS/MED HX  ENT/Pulmonary:     (+) tobacco use (light smoker for a couple years. ), Past use,     Neurologic:  - neg neurologic ROS     Cardiovascular: Comment: Denies cardiac symptoms including chest pain, SOB, palpitations, syncope, CHEUNG, orthopnea, or PND.   - neg cardiovascular ROS     METS/Exercise Tolerance: >4 METS Comment: Two children five and under - keeps busy chasing after kids.    Hematologic:     (+) anemia,     Musculoskeletal:  - neg musculoskeletal ROS     GI/Hepatic: Comment: H/o perforated appendix.       Renal/Genitourinary:  - neg Renal ROS     Endo:     (+) thyroid problem,  h/o subclinical hypothyroid,     Psychiatric/Substance Use:     (+) psychiatric history (H/o anxiety in the past. ) anxiety  (-) alcohol abuse history and chronic opioid use history   Infectious Disease: Comment: COVID (+) 8/26/2022 with mild symptoms of fatigue, lost of sense of smell and congestion. No residual symptoms.     Completed COVID  Vaccine and booster.  - neg infectious disease ROS     Malignancy:   (+) Malignancy, History of Other.Other CA Malignant neoplasm of kidney excluding renal pelvis, left (H) Active status post.    Other: Comment: Patient has never had anesthesia.  Reports her mom has PONV and get post-op migraines from anesthesia.            Virtual visit -  No vitals were obtained    Physical Exam  Constitutional: Awake, alert, cooperative, no apparent distress, and appears stated age.  Eyes: Pupils equal  HENT: Normocephalic  Respiratory: non labored breathing   Neurologic: Awake, alert, oriented to name, place and time.   Neuropsychiatric: Calm, cooperative. Normal affect.      Prior Labs/Diagnostic Studies    All labs and imaging personally reviewed   Lab Results   Component Value Date    WBC 7.3 08/17/2022    WBC 11.4 05/13/2020     Lab Results   Component Value Date    RBC 4.37 08/17/2022    RBC 3.95 05/13/2020     Lab Results   Component Value Date    HGB 12.7 08/17/2022    HGB 10.9 05/15/2020     Lab Results   Component Value Date    HCT 37.9 08/17/2022    HCT 35.2 05/13/2020     Lab Results   Component Value Date    MCV 87 08/17/2022    MCV 89 05/13/2020     Lab Results   Component Value Date    MCH 29.1 08/17/2022    MCH 30.4 05/13/2020     Lab Results   Component Value Date    MCHC 33.5 08/17/2022    MCHC 34.1 05/13/2020     Lab Results   Component Value Date    RDW 14.1 08/17/2022    RDW 13.9 05/13/2020     Lab Results   Component Value Date     08/17/2022     05/13/2020       Last Comprehensive Metabolic Panel:  Sodium   Date Value Ref Range Status   08/17/2022 137 133 - 144 mmol/L Final   12/06/2017 141 133 - 144 mmol/L Final     Potassium   Date Value Ref Range Status   08/17/2022 4.2 3.4 - 5.3 mmol/L Final   12/06/2017 4.2 3.4 - 5.3 mmol/L Final     Chloride   Date Value Ref Range Status   08/17/2022 108 94 - 109 mmol/L Final   12/06/2017 108 94 - 109 mmol/L Final     Carbon Dioxide   Date Value Ref Range Status   12/06/2017 24 20 - 32 mmol/L Final     Carbon Dioxide (CO2)   Date Value Ref Range Status   08/17/2022 21 20 - 32 mmol/L Final     Anion Gap   Date Value Ref Range Status   08/17/2022 8 3 - 14 mmol/L Final   12/06/2017 9 3 - 14 mmol/L Final     Glucose   Date Value Ref Range Status   08/17/2022 104 (H) 70 - 99 mg/dL Final   12/06/2017 112 (H) 70 - 99 mg/dL Final     Urea Nitrogen   Date Value Ref Range Status   08/17/2022 16 7 - 30 mg/dL Final   12/06/2017 13 7 - 30 mg/dL Final     Creatinine   Date Value Ref Range Status   08/17/2022 0.60 0.52 - 1.04 mg/dL Final   05/13/2020 0.48 (L) 0.52 - 1.04 mg/dL Final     GFR Estimate   Date Value Ref Range Status   08/17/2022 >90 >60  mL/min/1.73m2 Final     Comment:     Effective December 21, 2021 eGFRcr in adults is calculated using the 2021 CKD-EPI creatinine equation which includes age and gender (Robin et al., NEJ, DOI: 10.1056/UYBRzi6546681)   05/13/2020 >90 >60 mL/min/[1.73_m2] Final     Comment:     Non  GFR Calc  Starting 12/18/2018, serum creatinine based estimated GFR (eGFR) will be   calculated using the Chronic Kidney Disease Epidemiology Collaboration   (CKD-EPI) equation.       Calcium   Date Value Ref Range Status   08/17/2022 9.0 8.5 - 10.1 mg/dL Final   12/06/2017 9.0 8.5 - 10.1 mg/dL Final         The patient's records and results personally reviewed by this provider.     Outside records reviewed from: Care Everywhere      Assessment  Caroline Pride is a 41 year old female seen as a PAC referral for risk assessment and optimization for anesthesia.    Plan/Recommendations  Pt will be optimized for the proposed procedure.  See below for details on the assessment, risk, and preoperative recommendations    NEUROLOGY  - No history of TIA, CVA or seizure  - Denies chronic pain  -Post Op delirium risk factors:  No risk identified    ENT  - No current airway concerns.  Will need to be reassessed day of surgery.  Mallampati: Unable to assess  TM: Unable to assess    CARDIAC  - Denies known coronary artery disease.  Denies cardiac symptoms.  - METS (Metabolic Equivalents) >4 without symptoms.   - RCRI-Very low risk: Class 1 0.4% complication rate            Total Score: 0        PULMONARY  ALINE Low Risk            Total Score: 1    ALINE: Often tired      - Denies asthma or inhaler use  - Tobacco History  History   Smoking Status     Former Smoker     Packs/day: 0.50     Years: 2.00     Quit date: 1/13/2019   Smokeless Tobacco     Never Used       GI  - Denies GERD  PONV High Risk  Total Score: 3           1 AN PONV: Pt is Female    1 AN PONV: Patient is not a current smoker    1 AN PONV: Intended Post Op Opioids     "    /RENAL  - Malignant neoplasm of kidney excluding renal pelvis, left (H)   ~ incidental finding on imaging this past June when patient presented with perforated appendix.     ~ above procedure planned   ~ preoperative labs ordered.   - Baseline Creatinine  0.60    ENDOCRINE    - BMI: Estimated body mass index is 29.65 kg/m  as calculated from the following:    Height as of 8/17/22: 1.727 m (5' 8\").    Weight as of 8/17/22: 88.5 kg (195 lb).  Overweight (BMI 25.0-29.9)  - subclinical hypothyroid    HEME  VTE Medium Risk 1.8%            Total Score: 5    VTE: Current cancer      - No history of abnormal bleeding or antiplatelet use.  - Chronic iron deficient  anemia  Recommend perioperative use of blood conservation techniques intraoperatively and close monitoring for postoperative bleeding.  A type and screen has been ordered for this patient    ID  - COVID (+) 8/26/22 with mild symptoms.  Denies residual. Symptoms.       The patient is optimized for their procedure. AVS with information on surgery time/arrival time, meds and NPO status given by nursing staff. No further diagnostic testing indicated.    Please refer to the physical examination documented by the anesthesiologist in the anesthesia record on the day of surgery.    Video-Visit Details    Type of service:  Video Visit    Patient verbally consented to video service today: YES    Video Start Time: 12:30  Video End Time (time video stopped): 12:47    Originating Location (pt. Location): Home    Distant Location (provider location):  [facility] PREOPERATIVE ASSESSMENT [facility]     Mode of Communication:  Video Conference via AmWell  On the day of service:     Prep time: 12 minutes  Visit time: 17 minutes  Documentation time: 20 minutes  ------------------------------------------  Total time: 49 minutes      DANN Judge CNP  Preoperative Assessment Center  [facility]  Clinic and Surgery Center  Phone: [phone]  Fax: " [phone]

## 2022-09-09 NOTE — PATIENT INSTRUCTIONS
Preparing for Your Surgery      Name:  Caroline Pride   MRN:  [id number]   :  1980   Today's Date:  2022       Arriving for surgery:  Surgery date:  22  Arrival time:  5:30am       Visiting hours: 8 a.m. to 8:30 p.m.     Hospital: Adult patients and children (under age 18 can have 4 visitor at a time     No visitors under the age of 5 are allowed for hospital patients.     Surgeries and procedures: Adult patients can have 2 visitors all through the surgery process.     Patients with disabilities: Can have a support person with them (family member, service provider     Or someone well informed about their needs) plus the allowed number of visitors     Patients confirmed or suspected to have symptoms of COVID 19 or flu:     No visitors allowed for adult patients.   Children (under age 18) can have 1 named visitor.     People who are sick or showing symptoms of COVID 19 or flu:    Are not allowed to visit patients--we can only make exceptions in special situations.       Please follow these guidelines for your visit:   Arrive wearing a mask over your mouth and nose; we will give you a medical mask to wear    If you arrive wearing a cloth mask.   Keep it on during your entire visit, even when in patient's room.   If you don't wear a mask we'll ask you to leave.     Clean your hands with alcohol hand . Do this when you arrive at and leave the building and patient room,    And again after you touch your mask or anything in the room.     You can t visit if you have a fever, cough, shortness of breath, muscle aches, headaches, sore throat    Or diarrhea      Stay 6 feet away from others during your visit and between visits     Go directly to and from the room you are visiting.     Stay in the patient s room during your visit. Limit going to other places in the hospital as much as possible     Leave bags and jackets at home or in the car.     For everyone s health, please don t come and go  during your visit. That includes for smoking   during your visit. That includes for smoking    Please come to:     [facility] [address]  [address] [place]  [address]    - ?-   Parking is available in the Patient Visitor Ramp on [address] and [facility].     -   When entering the hospital you will be asked COVID screening questions, you will then be directed to Registration.  Registration will direct you to the 3rd floor Surgery waiting room.     -   Please ask if you need an escort or a wheelchair to the Surgery Waiting Room.  Preop number- [phone]     What can I eat or drink?  -  You may eat and drink normally for up to 8 hours before your surgery. (Until 11:00pm on 9/29/22)  -  You may have clear liquids until 2 hours before surgery. (Until 5:30am on 9/30/22)    Examples of clear liquids:  Water  Clear broth  Juices (apple, white grape, white cranberry  and cider) without pulp  Noncarbonated, powder based beverages  (lemonade and Devante-Aid)  Sodas (Sprite, 7-Up, ginger ale and seltzer)  Coffee or tea (without milk or cream)  Gatorade    -  No Alcohol for at least 24 hours before surgery.     Which medicines can I take?    Hold Multivitamins for 7 days before surgery.  Hold Supplements for 7 days before surgery.  Hold Ibuprofen (Advil, Motrin) for 1 day before surgery--unless otherwise directed by surgeon.  Hold Naproxen (Aleve) for 4 days before surgery.      -  DO NOT take these medications the day of surgery:   Ketoconazole cream    -  PLEASE TAKE these medications the day of surgery:   Valacyclovir (valtrex) as needed    How do I prepare myself?  - Please take 2 showers before surgery using Scrubcare or Hibiclens soap.    Use this soap only from the neck to your toes.     Leave the soap on your skin for one minute--then rinse thoroughly.      You may use your own shampoo and conditioner. No other hair products.   - Please remove all  jewelry and body piercings.  - No lotions, deodorants or fragrance.  - No makeup or fingernail polish.   - Bring your ID and insurance card.    -If you have a Deep Brain Stimulator, Spinal Cord Stimulator, or any Neuro Stimulator device---you must bring the remote control to the hospital.           Questions or Concerns:    - For any questions regarding the day of surgery or your hospital stay, please contact the Pre Admission Nursing Office at [phone].       - If you have health changes between today and your surgery, please call your surgeon.       - For questions after surgery, please call your surgeons office.

## 2022-09-11 ENCOUNTER — HEALTH MAINTENANCE LETTER (OUTPATIENT)
Age: 42
End: 2022-09-11

## 2022-09-15 LAB
ABO/RH(D): NORMAL
ANTIBODY SCREEN: NEGATIVE
SPECIMEN EXPIRATION DATE: NORMAL

## 2022-09-16 ENCOUNTER — LAB (OUTPATIENT)
Dept: LAB | Facility: CLINIC | Age: 42
End: 2022-09-16
Payer: COMMERCIAL

## 2022-09-16 DIAGNOSIS — C64.2 MALIGNANT NEOPLASM OF LEFT KIDNEY (H): ICD-10-CM

## 2022-09-16 LAB
ALBUMIN UR-MCNC: NEGATIVE MG/DL
ANION GAP SERPL CALCULATED.3IONS-SCNC: 9 MMOL/L (ref 7–15)
APPEARANCE UR: CLEAR
BILIRUB UR QL STRIP: NEGATIVE
BUN SERPL-MCNC: 19.9 MG/DL (ref 6–20)
CALCIUM SERPL-MCNC: 9.6 MG/DL (ref 8.6–10)
CHLORIDE SERPL-SCNC: 105 MMOL/L (ref 98–107)
COLOR UR AUTO: NORMAL
CREAT SERPL-MCNC: 0.69 MG/DL (ref 0.51–0.95)
DEPRECATED HCO3 PLAS-SCNC: 24 MMOL/L (ref 22–29)
ERYTHROCYTE [DISTWIDTH] IN BLOOD BY AUTOMATED COUNT: 13.2 % (ref 10–15)
GFR SERPL CREATININE-BSD FRML MDRD: >90 ML/MIN/1.73M2
GLUCOSE SERPL-MCNC: 96 MG/DL (ref 70–99)
GLUCOSE UR STRIP-MCNC: NEGATIVE MG/DL
HCT VFR BLD AUTO: 36.8 % (ref 35–47)
HGB BLD-MCNC: 12.2 G/DL (ref 11.7–15.7)
HGB UR QL STRIP: NEGATIVE
KETONES UR STRIP-MCNC: NEGATIVE MG/DL
LEUKOCYTE ESTERASE UR QL STRIP: NEGATIVE
MCH RBC QN AUTO: 28.6 PG (ref 26.5–33)
MCHC RBC AUTO-ENTMCNC: 33.2 G/DL (ref 31.5–36.5)
MCV RBC AUTO: 86 FL (ref 78–100)
NITRATE UR QL: NEGATIVE
PH UR STRIP: 7 [PH] (ref 5–7)
PLATELET # BLD AUTO: 296 10E3/UL (ref 150–450)
POTASSIUM SERPL-SCNC: 4.4 MMOL/L (ref 3.4–5.3)
RBC # BLD AUTO: 4.26 10E6/UL (ref 3.8–5.2)
RBC URINE: 0 /HPF
SODIUM SERPL-SCNC: 138 MMOL/L (ref 136–145)
SP GR UR STRIP: 1.02 (ref 1–1.03)
SQUAMOUS EPITHELIAL: 1 /HPF
UROBILINOGEN UR STRIP-MCNC: NORMAL MG/DL
WBC # BLD AUTO: 7.8 10E3/UL (ref 4–11)
WBC URINE: 1 /HPF

## 2022-09-16 PROCEDURE — 87086 URINE CULTURE/COLONY COUNT: CPT | Performed by: UROLOGY

## 2022-09-16 PROCEDURE — 81001 URINALYSIS AUTO W/SCOPE: CPT | Performed by: PATHOLOGY

## 2022-09-16 PROCEDURE — 86901 BLOOD TYPING SEROLOGIC RH(D): CPT | Performed by: NURSE PRACTITIONER

## 2022-09-16 PROCEDURE — 85027 COMPLETE CBC AUTOMATED: CPT | Performed by: PATHOLOGY

## 2022-09-16 PROCEDURE — 36415 COLL VENOUS BLD VENIPUNCTURE: CPT | Performed by: PATHOLOGY

## 2022-09-16 PROCEDURE — 80048 BASIC METABOLIC PNL TOTAL CA: CPT | Performed by: PATHOLOGY

## 2022-09-18 LAB — BACTERIA UR CULT: NORMAL

## 2022-09-29 ENCOUNTER — ANESTHESIA EVENT (OUTPATIENT)
Dept: SURGERY | Facility: CLINIC | Age: 42
End: 2022-09-29
Payer: COMMERCIAL

## 2022-09-30 ENCOUNTER — HOSPITAL ENCOUNTER (INPATIENT)
Facility: CLINIC | Age: 42
LOS: 1 days | Discharge: HOME | End: 2022-10-01
Attending: UROLOGY | Admitting: UROLOGY
Payer: COMMERCIAL

## 2022-09-30 ENCOUNTER — ANESTHESIA (OUTPATIENT)
Dept: SURGERY | Facility: CLINIC | Age: 42
End: 2022-09-30
Payer: COMMERCIAL

## 2022-09-30 DIAGNOSIS — G89.18 POSTOPERATIVE PAIN: Primary | ICD-10-CM

## 2022-09-30 PROBLEM — N28.89 LEFT RENAL MASS: Status: ACTIVE | Noted: 2022-09-30

## 2022-09-30 LAB
ABO/RH(D): NORMAL
ANION GAP SERPL CALCULATED.3IONS-SCNC: 10 MMOL/L (ref 7–15)
ANTIBODY SCREEN: NEGATIVE
BUN SERPL-MCNC: 20.6 MG/DL (ref 6–20)
CALCIUM SERPL-MCNC: 8.7 MG/DL (ref 8.6–10)
CHLORIDE SERPL-SCNC: 104 MMOL/L (ref 98–107)
CREAT SERPL-MCNC: 0.92 MG/DL (ref 0.51–0.95)
DEPRECATED HCO3 PLAS-SCNC: 21 MMOL/L (ref 22–29)
ERYTHROCYTE [DISTWIDTH] IN BLOOD BY AUTOMATED COUNT: 13 % (ref 10–15)
GFR SERPL CREATININE-BSD FRML MDRD: 79 ML/MIN/1.73M2
GLUCOSE BLDC GLUCOMTR-MCNC: 120 MG/DL (ref 70–99)
GLUCOSE SERPL-MCNC: 154 MG/DL (ref 70–99)
HCT VFR BLD AUTO: 34.5 % (ref 35–47)
HGB BLD-MCNC: 11.5 G/DL (ref 11.7–15.7)
MCH RBC QN AUTO: 28.8 PG (ref 26.5–33)
MCHC RBC AUTO-ENTMCNC: 33.3 G/DL (ref 31.5–36.5)
MCV RBC AUTO: 86 FL (ref 78–100)
PLATELET # BLD AUTO: 325 10E3/UL (ref 150–450)
POTASSIUM SERPL-SCNC: 4.5 MMOL/L (ref 3.4–5.3)
RBC # BLD AUTO: 4 10E6/UL (ref 3.8–5.2)
SODIUM SERPL-SCNC: 135 MMOL/L (ref 136–145)
SPECIMEN EXPIRATION DATE: NORMAL
WBC # BLD AUTO: 16.5 10E3/UL (ref 4–11)

## 2022-09-30 PROCEDURE — 250N000011 HC RX IP 250 OP 636: Performed by: UROLOGY

## 2022-09-30 PROCEDURE — 272N000001 HC OR GENERAL SUPPLY STERILE: Performed by: UROLOGY

## 2022-09-30 PROCEDURE — 0DTJ4ZZ RESECTION OF APPENDIX, PERCUTANEOUS ENDOSCOPIC APPROACH: ICD-10-PCS | Performed by: SURGERY

## 2022-09-30 PROCEDURE — 250N000025 HC SEVOFLURANE, PER MIN: Performed by: UROLOGY

## 2022-09-30 PROCEDURE — 370N000017 HC ANESTHESIA TECHNICAL FEE, PER MIN: Performed by: UROLOGY

## 2022-09-30 PROCEDURE — 710N000010 HC RECOVERY PHASE 1, LEVEL 2, PER MIN: Performed by: UROLOGY

## 2022-09-30 PROCEDURE — 250N000011 HC RX IP 250 OP 636: Performed by: STUDENT IN AN ORGANIZED HEALTH CARE EDUCATION/TRAINING PROGRAM

## 2022-09-30 PROCEDURE — 258N000003 HC RX IP 258 OP 636: Performed by: STUDENT IN AN ORGANIZED HEALTH CARE EDUCATION/TRAINING PROGRAM

## 2022-09-30 PROCEDURE — 50546 LAPAROSCOPIC NEPHRECTOMY: CPT | Mod: LT | Performed by: UROLOGY

## 2022-09-30 PROCEDURE — 36415 COLL VENOUS BLD VENIPUNCTURE: CPT | Performed by: UROLOGY

## 2022-09-30 PROCEDURE — 86901 BLOOD TYPING SEROLOGIC RH(D): CPT | Performed by: UROLOGY

## 2022-09-30 PROCEDURE — 88307 TISSUE EXAM BY PATHOLOGIST: CPT | Mod: TC | Performed by: UROLOGY

## 2022-09-30 PROCEDURE — 250N000013 HC RX MED GY IP 250 OP 250 PS 637: Performed by: UROLOGY

## 2022-09-30 PROCEDURE — 250N000011 HC RX IP 250 OP 636: Performed by: NURSE ANESTHETIST, CERTIFIED REGISTERED

## 2022-09-30 PROCEDURE — 88304 TISSUE EXAM BY PATHOLOGIST: CPT | Mod: TC | Performed by: UROLOGY

## 2022-09-30 PROCEDURE — 86850 RBC ANTIBODY SCREEN: CPT | Performed by: UROLOGY

## 2022-09-30 PROCEDURE — 44970 LAPAROSCOPY APPENDECTOMY: CPT | Performed by: SURGERY

## 2022-09-30 PROCEDURE — 8E0W4CZ ROBOTIC ASSISTED PROCEDURE OF TRUNK REGION, PERCUTANEOUS ENDOSCOPIC APPROACH: ICD-10-PCS | Performed by: UROLOGY

## 2022-09-30 PROCEDURE — 80048 BASIC METABOLIC PNL TOTAL CA: CPT | Performed by: STUDENT IN AN ORGANIZED HEALTH CARE EDUCATION/TRAINING PROGRAM

## 2022-09-30 PROCEDURE — 360N000080 HC SURGERY LEVEL 7, PER MIN: Performed by: UROLOGY

## 2022-09-30 PROCEDURE — 999N000141 HC STATISTIC PRE-PROCEDURE NURSING ASSESSMENT: Performed by: UROLOGY

## 2022-09-30 PROCEDURE — 258N000003 HC RX IP 258 OP 636: Performed by: NURSE ANESTHETIST, CERTIFIED REGISTERED

## 2022-09-30 PROCEDURE — 272N000004 HC RX 272: Performed by: UROLOGY

## 2022-09-30 PROCEDURE — 250N000009 HC RX 250: Performed by: UROLOGY

## 2022-09-30 PROCEDURE — 250N000013 HC RX MED GY IP 250 OP 250 PS 637: Performed by: STUDENT IN AN ORGANIZED HEALTH CARE EDUCATION/TRAINING PROGRAM

## 2022-09-30 PROCEDURE — 0TT14ZZ RESECTION OF LEFT KIDNEY, PERCUTANEOUS ENDOSCOPIC APPROACH: ICD-10-PCS | Performed by: UROLOGY

## 2022-09-30 PROCEDURE — 85027 COMPLETE CBC AUTOMATED: CPT | Performed by: STUDENT IN AN ORGANIZED HEALTH CARE EDUCATION/TRAINING PROGRAM

## 2022-09-30 PROCEDURE — 36415 COLL VENOUS BLD VENIPUNCTURE: CPT | Performed by: STUDENT IN AN ORGANIZED HEALTH CARE EDUCATION/TRAINING PROGRAM

## 2022-09-30 PROCEDURE — 120N000002 HC R&B MED SURG/OB UMMC

## 2022-09-30 PROCEDURE — 250N000009 HC RX 250: Performed by: NURSE ANESTHETIST, CERTIFIED REGISTERED

## 2022-09-30 RX ORDER — METHOCARBAMOL 750 MG/1
750 TABLET, FILM COATED ORAL EVERY 6 HOURS PRN
Status: DISCONTINUED | OUTPATIENT
Start: 2022-09-30 | End: 2022-10-01 | Stop reason: HOSPADM

## 2022-09-30 RX ORDER — HYDROXYZINE HYDROCHLORIDE 25 MG/1
25 TABLET, FILM COATED ORAL EVERY 6 HOURS PRN
Status: DISCONTINUED | OUTPATIENT
Start: 2022-09-30 | End: 2022-10-01 | Stop reason: HOSPADM

## 2022-09-30 RX ORDER — BUPIVACAINE HYDROCHLORIDE 5 MG/ML
INJECTION, SOLUTION PERINEURAL PRN
Status: DISCONTINUED | OUTPATIENT
Start: 2022-09-30 | End: 2022-09-30 | Stop reason: HOSPADM

## 2022-09-30 RX ORDER — SODIUM CHLORIDE, SODIUM LACTATE, POTASSIUM CHLORIDE, CALCIUM CHLORIDE 600; 310; 30; 20 MG/100ML; MG/100ML; MG/100ML; MG/100ML
INJECTION, SOLUTION INTRAVENOUS CONTINUOUS PRN
Status: DISCONTINUED | OUTPATIENT
Start: 2022-09-30 | End: 2022-09-30

## 2022-09-30 RX ORDER — NALOXONE HYDROCHLORIDE 0.4 MG/ML
0.4 INJECTION, SOLUTION INTRAMUSCULAR; INTRAVENOUS; SUBCUTANEOUS
Status: DISCONTINUED | OUTPATIENT
Start: 2022-09-30 | End: 2022-10-01 | Stop reason: HOSPADM

## 2022-09-30 RX ORDER — ONDANSETRON 4 MG/1
4 TABLET, ORALLY DISINTEGRATING ORAL EVERY 6 HOURS PRN
Status: DISCONTINUED | OUTPATIENT
Start: 2022-09-30 | End: 2022-10-01 | Stop reason: HOSPADM

## 2022-09-30 RX ORDER — SODIUM CHLORIDE 9 MG/ML
INJECTION, SOLUTION INTRAVENOUS CONTINUOUS
Status: ACTIVE | OUTPATIENT
Start: 2022-09-30 | End: 2022-10-01

## 2022-09-30 RX ORDER — ACETAMINOPHEN 325 MG/1
975 TABLET ORAL 4 TIMES DAILY
Status: DISCONTINUED | OUTPATIENT
Start: 2022-09-30 | End: 2022-10-01 | Stop reason: HOSPADM

## 2022-09-30 RX ORDER — CEFAZOLIN SODIUM/WATER 2 G/20 ML
2 SYRINGE (ML) INTRAVENOUS SEE ADMIN INSTRUCTIONS
Status: DISCONTINUED | OUTPATIENT
Start: 2022-09-30 | End: 2022-09-30 | Stop reason: HOSPADM

## 2022-09-30 RX ORDER — OXYCODONE HYDROCHLORIDE 10 MG/1
10 TABLET ORAL EVERY 4 HOURS PRN
Status: DISCONTINUED | OUTPATIENT
Start: 2022-09-30 | End: 2022-10-01 | Stop reason: HOSPADM

## 2022-09-30 RX ORDER — FENTANYL CITRATE 50 UG/ML
INJECTION, SOLUTION INTRAMUSCULAR; INTRAVENOUS PRN
Status: DISCONTINUED | OUTPATIENT
Start: 2022-09-30 | End: 2022-09-30

## 2022-09-30 RX ORDER — ONDANSETRON 2 MG/ML
INJECTION INTRAMUSCULAR; INTRAVENOUS PRN
Status: DISCONTINUED | OUTPATIENT
Start: 2022-09-30 | End: 2022-09-30

## 2022-09-30 RX ORDER — POLYETHYLENE GLYCOL 3350 17 G/17G
17 POWDER, FOR SOLUTION ORAL DAILY
Status: DISCONTINUED | OUTPATIENT
Start: 2022-10-01 | End: 2022-10-01 | Stop reason: HOSPADM

## 2022-09-30 RX ORDER — HEPARIN SODIUM 5000 [USP'U]/.5ML
5000 INJECTION, SOLUTION INTRAVENOUS; SUBCUTANEOUS
Status: COMPLETED | OUTPATIENT
Start: 2022-09-30 | End: 2022-09-30

## 2022-09-30 RX ORDER — LIDOCAINE 40 MG/G
CREAM TOPICAL
Status: DISCONTINUED | OUTPATIENT
Start: 2022-09-30 | End: 2022-10-01 | Stop reason: HOSPADM

## 2022-09-30 RX ORDER — NALOXONE HYDROCHLORIDE 0.4 MG/ML
0.2 INJECTION, SOLUTION INTRAMUSCULAR; INTRAVENOUS; SUBCUTANEOUS
Status: DISCONTINUED | OUTPATIENT
Start: 2022-09-30 | End: 2022-10-01 | Stop reason: HOSPADM

## 2022-09-30 RX ORDER — PROPOFOL 10 MG/ML
INJECTION, EMULSION INTRAVENOUS CONTINUOUS PRN
Status: DISCONTINUED | OUTPATIENT
Start: 2022-09-30 | End: 2022-09-30

## 2022-09-30 RX ORDER — ONDANSETRON 2 MG/ML
4 INJECTION INTRAMUSCULAR; INTRAVENOUS EVERY 6 HOURS PRN
Status: DISCONTINUED | OUTPATIENT
Start: 2022-09-30 | End: 2022-10-01 | Stop reason: HOSPADM

## 2022-09-30 RX ORDER — LIDOCAINE 40 MG/G
CREAM TOPICAL
Status: DISCONTINUED | OUTPATIENT
Start: 2022-09-30 | End: 2022-09-30 | Stop reason: HOSPADM

## 2022-09-30 RX ORDER — CALCIUM CARBONATE 500 MG/1
500 TABLET, CHEWABLE ORAL 4 TIMES DAILY PRN
Status: DISCONTINUED | OUTPATIENT
Start: 2022-09-30 | End: 2022-10-01 | Stop reason: HOSPADM

## 2022-09-30 RX ORDER — OXYCODONE HYDROCHLORIDE 5 MG/1
5 TABLET ORAL EVERY 4 HOURS PRN
Status: DISCONTINUED | OUTPATIENT
Start: 2022-09-30 | End: 2022-10-01 | Stop reason: HOSPADM

## 2022-09-30 RX ORDER — GABAPENTIN 300 MG/1
300 CAPSULE ORAL
Status: COMPLETED | OUTPATIENT
Start: 2022-09-30 | End: 2022-09-30

## 2022-09-30 RX ORDER — HYDROMORPHONE HCL IN WATER/PF 6 MG/30 ML
0.2 PATIENT CONTROLLED ANALGESIA SYRINGE INTRAVENOUS
Status: DISCONTINUED | OUTPATIENT
Start: 2022-09-30 | End: 2022-10-01 | Stop reason: HOSPADM

## 2022-09-30 RX ORDER — GABAPENTIN 100 MG/1
100 CAPSULE ORAL 3 TIMES DAILY
Status: DISCONTINUED | OUTPATIENT
Start: 2022-09-30 | End: 2022-10-01 | Stop reason: HOSPADM

## 2022-09-30 RX ORDER — LANOLIN ALCOHOL/MO/W.PET/CERES
3 CREAM (GRAM) TOPICAL
Status: DISCONTINUED | OUTPATIENT
Start: 2022-09-30 | End: 2022-10-01 | Stop reason: HOSPADM

## 2022-09-30 RX ORDER — PROPOFOL 10 MG/ML
INJECTION, EMULSION INTRAVENOUS PRN
Status: DISCONTINUED | OUTPATIENT
Start: 2022-09-30 | End: 2022-09-30

## 2022-09-30 RX ORDER — DEXAMETHASONE SODIUM PHOSPHATE 4 MG/ML
INJECTION, SOLUTION INTRA-ARTICULAR; INTRALESIONAL; INTRAMUSCULAR; INTRAVENOUS; SOFT TISSUE PRN
Status: DISCONTINUED | OUTPATIENT
Start: 2022-09-30 | End: 2022-09-30

## 2022-09-30 RX ORDER — HYDROMORPHONE HCL IN WATER/PF 6 MG/30 ML
0.4 PATIENT CONTROLLED ANALGESIA SYRINGE INTRAVENOUS
Status: DISCONTINUED | OUTPATIENT
Start: 2022-09-30 | End: 2022-10-01 | Stop reason: HOSPADM

## 2022-09-30 RX ORDER — PROCHLORPERAZINE MALEATE 5 MG
10 TABLET ORAL EVERY 6 HOURS PRN
Status: DISCONTINUED | OUTPATIENT
Start: 2022-09-30 | End: 2022-10-01 | Stop reason: HOSPADM

## 2022-09-30 RX ORDER — CEFAZOLIN SODIUM/WATER 2 G/20 ML
2 SYRINGE (ML) INTRAVENOUS
Status: COMPLETED | OUTPATIENT
Start: 2022-09-30 | End: 2022-09-30

## 2022-09-30 RX ORDER — SIMETHICONE 80 MG
80 TABLET,CHEWABLE ORAL EVERY 6 HOURS PRN
Status: DISCONTINUED | OUTPATIENT
Start: 2022-09-30 | End: 2022-10-01 | Stop reason: HOSPADM

## 2022-09-30 RX ORDER — AMOXICILLIN 250 MG
1 CAPSULE ORAL 2 TIMES DAILY
Status: DISCONTINUED | OUTPATIENT
Start: 2022-09-30 | End: 2022-10-01 | Stop reason: HOSPADM

## 2022-09-30 RX ORDER — LIDOCAINE HYDROCHLORIDE 20 MG/ML
INJECTION, SOLUTION INFILTRATION; PERINEURAL PRN
Status: DISCONTINUED | OUTPATIENT
Start: 2022-09-30 | End: 2022-09-30

## 2022-09-30 RX ADMIN — SODIUM CHLORIDE: 9 INJECTION, SOLUTION INTRAVENOUS at 16:04

## 2022-09-30 RX ADMIN — Medication 20 MG: at 11:09

## 2022-09-30 RX ADMIN — PROPOFOL 30 MCG/KG/MIN: 10 INJECTION, EMULSION INTRAVENOUS at 08:22

## 2022-09-30 RX ADMIN — LIDOCAINE HYDROCHLORIDE 100 MG: 20 INJECTION, SOLUTION INFILTRATION; PERINEURAL at 07:53

## 2022-09-30 RX ADMIN — HYDROMORPHONE HYDROCHLORIDE 0.4 MG: 0.2 INJECTION, SOLUTION INTRAMUSCULAR; INTRAVENOUS; SUBCUTANEOUS at 13:27

## 2022-09-30 RX ADMIN — PROCHLORPERAZINE EDISYLATE 10 MG: 5 INJECTION INTRAMUSCULAR; INTRAVENOUS at 18:40

## 2022-09-30 RX ADMIN — Medication 2 G: at 08:08

## 2022-09-30 RX ADMIN — FENTANYL CITRATE 100 MCG: 50 INJECTION, SOLUTION INTRAMUSCULAR; INTRAVENOUS at 08:24

## 2022-09-30 RX ADMIN — Medication 50 MG: at 07:53

## 2022-09-30 RX ADMIN — HYDROMORPHONE HYDROCHLORIDE 0.4 MG: 0.2 INJECTION, SOLUTION INTRAMUSCULAR; INTRAVENOUS; SUBCUTANEOUS at 20:22

## 2022-09-30 RX ADMIN — ONDANSETRON 4 MG: 2 INJECTION INTRAMUSCULAR; INTRAVENOUS at 11:56

## 2022-09-30 RX ADMIN — SODIUM CHLORIDE, POTASSIUM CHLORIDE, SODIUM LACTATE AND CALCIUM CHLORIDE: 600; 310; 30; 20 INJECTION, SOLUTION INTRAVENOUS at 07:43

## 2022-09-30 RX ADMIN — MIDAZOLAM 2 MG: 1 INJECTION INTRAMUSCULAR; INTRAVENOUS at 07:43

## 2022-09-30 RX ADMIN — HYDROMORPHONE HYDROCHLORIDE 0.4 MG: 0.2 INJECTION, SOLUTION INTRAMUSCULAR; INTRAVENOUS; SUBCUTANEOUS at 15:46

## 2022-09-30 RX ADMIN — Medication 30 MG: at 10:24

## 2022-09-30 RX ADMIN — GABAPENTIN 300 MG: 300 CAPSULE ORAL at 06:58

## 2022-09-30 RX ADMIN — OXYCODONE HYDROCHLORIDE 10 MG: 10 TABLET ORAL at 13:29

## 2022-09-30 RX ADMIN — SODIUM CHLORIDE, POTASSIUM CHLORIDE, SODIUM LACTATE AND CALCIUM CHLORIDE: 600; 310; 30; 20 INJECTION, SOLUTION INTRAVENOUS at 09:14

## 2022-09-30 RX ADMIN — Medication 30 MG: at 08:27

## 2022-09-30 RX ADMIN — DEXAMETHASONE SODIUM PHOSPHATE 10 MG: 4 INJECTION, SOLUTION INTRA-ARTICULAR; INTRALESIONAL; INTRAMUSCULAR; INTRAVENOUS; SOFT TISSUE at 07:53

## 2022-09-30 RX ADMIN — PROPOFOL 160 MG: 10 INJECTION, EMULSION INTRAVENOUS at 07:53

## 2022-09-30 RX ADMIN — FENTANYL CITRATE 100 MCG: 50 INJECTION, SOLUTION INTRAMUSCULAR; INTRAVENOUS at 08:40

## 2022-09-30 RX ADMIN — HYDROXYZINE HYDROCHLORIDE 25 MG: 25 TABLET, FILM COATED ORAL at 20:22

## 2022-09-30 RX ADMIN — Medication 20 MG: at 09:15

## 2022-09-30 RX ADMIN — ONDANSETRON 4 MG: 2 INJECTION INTRAMUSCULAR; INTRAVENOUS at 17:42

## 2022-09-30 RX ADMIN — ACETAMINOPHEN 975 MG: 325 TABLET, FILM COATED ORAL at 17:42

## 2022-09-30 RX ADMIN — GABAPENTIN 100 MG: 100 CAPSULE ORAL at 15:46

## 2022-09-30 RX ADMIN — HYDROMORPHONE HYDROCHLORIDE 0.5 MG: 1 INJECTION, SOLUTION INTRAMUSCULAR; INTRAVENOUS; SUBCUTANEOUS at 10:35

## 2022-09-30 RX ADMIN — ACETAMINOPHEN 975 MG: 325 TABLET, FILM COATED ORAL at 13:29

## 2022-09-30 RX ADMIN — OXYCODONE HYDROCHLORIDE 10 MG: 10 TABLET ORAL at 17:42

## 2022-09-30 RX ADMIN — HEPARIN SODIUM 5000 UNITS: 5000 INJECTION, SOLUTION INTRAVENOUS; SUBCUTANEOUS at 06:58

## 2022-09-30 RX ADMIN — PROPOFOL 50 MCG/KG/MIN: 10 INJECTION, EMULSION INTRAVENOUS at 11:36

## 2022-09-30 RX ADMIN — SUGAMMADEX 200 MG: 100 INJECTION, SOLUTION INTRAVENOUS at 12:12

## 2022-09-30 ASSESSMENT — ACTIVITIES OF DAILY LIVING (ADL)
ADLS_ACUITY_SCORE: 18

## 2022-09-30 NOTE — OP NOTE
DATE OF SURGERY: 09/30/2022      PREOPERATIVE DIAGNOSIS:  Left renal mass      POSTOPERATIVE DIAGNOSIS: Same      PROCEDURE PERFORMED:   1) Left robotic-assisted laparoscopic radical nephrectomy     STAFF SURGEON: Luis Fernando Sigala MD      RESIDENT SURGEON: Sea Caraballo MD-PGY5; Tello Sarah MD-PGY2     ANESTHESIA: General.      ESTIMATED BLOOD LOSS: 20 mL.      DRAINS AND TUBES: 16 Fr Black catheter     COMPLICATIONS: None.      DISPOSITION: PACU.      SPECIMENS OBTAINED: Left kidney and proximal ureter     SIGNIFICANT FINDINGS: Left kidney was resected with visually negative margins, adrenal gland was spared.     HISTORY OF PRESENT ILLNESS:   Caroline Pride is a 41 year old woman who presented to clinic to discuss management of a renal mass discovered during workup for appendicitis. After discussing different treatment options, she decided to undergo above mentioned procedure      OPERATION PERFORMED:   Informed consent was obtained and the patient was brought to the operating room where general anesthesia was induced. He was given appropriate preoperative antibiotics and positioned in the lithotomy position. We then performed a timeout, verifying the correct patient's site and procedure to be performed, with plan for concomitant appendectomy by general surgery under the same anesthesia.     She was placed in a modified flank position with left side up where all pressure points were carefully padded and secured. She was then prepped and draped in the usual sterile fashion.       Veress needle was used to gain acess detention between ASIS and umbilicus which was not successful. We then made a small incision where the camera port was planned to be placed and made a small incision down the the fascia and then placed the Veress needle. After confirmatory drop test, the Veress needle was used for insufflation. We placed a camera port and three dilating ports (three 8 mm robotic ports and a 12 mm assistant port). The robot  was docked. The colon was reflected medially to expose the anterior surface of Gerota's fascia. The medial aspect of the left kidney was exposed and the ureter and gonadal vein were identified.The kidney was lifted off the psoas muscle and dissection was carried posteriorly until the renal artery and vein were identified. The renal artery was carefully skeletonized. There was a single renal artery and one main vein which split proximally into two veins. The renal artery and veins were sequentially ligated and divided with the stapler. The lateral and upper pole attachments were then taken. The gonadal vein and ureter were ligated with clips and divided. The adrenal gland was spared. Excellent hemostasis was noted. No lymphadenopathy was appreciated.     Specimen was placed in a 15 mm EndoCatch bag. Ports were removed. The left lower quadrant 8 mm port was extended slightly medially to allow tumor extraction. The specimen was removed and sent to pathology. We closed the fascia from the extraction site with 1-PDS in a running fashion and tied in the middle. Skin was re approximated using 3- vicryl and 4-0 Monocryl. The port sites were left with the ports in place and the case was turned over to general surgery for their appendectomy.     Plan:  - Admit to urology  - Regular diet  - TOV in AM  - Discharge tomorrow if meeting goals

## 2022-09-30 NOTE — ANESTHESIA CARE TRANSFER NOTE
Patient: Caroline Pride    Procedure: Procedure(s):  NEPHRECTOMY, ROBOT-ASSISTED  APPENDECTOMY, LAPAROSCOPIC       Diagnosis: Malignant neoplasm of kidney excluding renal pelvis, left (H) [C64.2]  Diagnosis Additional Information: No value filed.    Anesthesia Type:   General     Note:    Oropharynx: oropharynx clear of all foreign objects  Level of Consciousness: awake  Oxygen Supplementation: room air    Independent Airway: airway patency satisfactory and stable  Dentition: dentition unchanged  Vital Signs Stable: post-procedure vital signs reviewed and stable  Report to RN Given: handoff report given  Patient transferred to: PACU    Handoff Report: Identifed the Patient, Identified the Reponsible Provider, Reviewed the pertinent medical history, Discussed the surgical course, Reviewed Intra-OP anesthesia mangement and issues during anesthesia, Set expectations for post-procedure period and Allowed opportunity for questions and acknowledgement of understanding      Vitals:  Vitals Value Taken Time   /75 09/30/22 1227   Temp 36.1  C (97  F) 09/30/22 1227   Pulse 80 09/30/22 1227   Resp 20 09/30/22 1227   SpO2 97 % 09/30/22 1227   Vitals shown include unvalidated device data.    Electronically Signed By: DANN Garsia CRNA  September 30, 2022  12:28 PM

## 2022-09-30 NOTE — PROGRESS NOTES
Status: POD# 0 NEPHRECTOMY, ROBOT-ASSISTED  APPENDECTOMY, LAPAROSCOPIC  Neuro: A/Ox4 calls appropriately  Respiratory: RA denies SOB  Cardiac: WDL, denies cardiac chest pain   GI/: voiding via ohara cath  Diet: Regular diet  Incisions/Drains: 5 left abdominal  incision site, covered with liquid bandage CDI.   Skin: WNL  IV Access: Left PIV infusing 75 mL /hr   Activity:  SBA  Plan:  Continue to monitor and follow POC

## 2022-09-30 NOTE — BRIEF OP NOTE
[facility]    Brief Operative Note    Pre-operative diagnosis: Malignant neoplasm of kidney excluding renal pelvis, left (H) [C64.2]  Post-operative diagnosis Same as pre-operative diagnosis    Procedure: Procedure(s):  NEPHRECTOMY, ROBOT-ASSISTED  APPENDECTOMY, LAPAROSCOPIC  Surgeon: Surgeon(s) and Role:  Panel 1:     * Luis Fernando Sigala MD - Primary     * Sea Caraballo MD - Resident - Assisting     * Tello Sarah MD - Resident - Assisting  Panel 2:     * Heron Montana DO - Primary  Anesthesia: General   Estimated Blood Loss: Less than 50 ml    Drains:  16 Fr Black catheter  Specimens:   ID Type Source Tests Collected by Time Destination   1 : Left Kidney Tissue Kidney, Left SURGICAL PATHOLOGY EXAM Luis Fernando Sigala MD 9/30/2022 11:12 AM      Findings:   Left radical nephrectomy.  Complications: None.  Implants: * No implants in log *    Plan:  - Admit  - Black out in AM      Sea Caraballo MD  Urology PGY-5

## 2022-09-30 NOTE — ANESTHESIA PROCEDURE NOTES
Airway       Patient location during procedure: OR       Procedure Start/Stop Times: 9/30/2022 7:53 AM and 9/30/2022 7:55 AM  Staff -        CRNA: Luis Gamez APRN CRNA       Performed By: CRNA  Consent for Airway        Urgency: elective  Indications and Patient Condition       Indications for airway management: trupti-procedural       Induction type:intravenous       Mask difficulty assessment: 1 - vent by mask    Final Airway Details       Final airway type: endotracheal airway       Successful airway: ETT - single  Endotracheal Airway Details        ETT size (mm): 7.0       Cuffed: yes       Successful intubation technique: direct laryngoscopy       DL Blade Type: Rutherford 2       Grade View of Cords: 1       Adjucts: stylet       Position: Right       Measured from: gums/teeth       Secured at (cm): 21       Bite block used: None    Post intubation assessment        Placement verified by: capnometry, equal breath sounds and chest rise        Number of attempts at approach: 1       Number of other approaches attempted: 0       Secured with: pink tape       Ease of procedure: easy       Dentition: Intact    Medication(s) Administered   Medication Administration Time: 9/30/2022 7:53 AM

## 2022-09-30 NOTE — BRIEF OP NOTE
[facility]    Brief Operative Note    Pre-operative diagnosis: H/o Perforated appendicitis  Post-operative diagnosis Same as pre-operative diagnosis    Procedure: Procedure(s):  NEPHRECTOMY, ROBOT-ASSISTED  APPENDECTOMY, LAPAROSCOPIC Interval  Surgeon: Surgeon(s) and Role:  Panel 1:     * Luis Fernando Sigala MD - Primary     * Sea Caraballo MD - Resident - Assisting     * Tello Sarah MD - Resident - Assisting  Panel 2:     * Heron Montana DO - Primary  Anesthesia: General   Estimated Blood Loss: 5 cc      Drains: None  Specimens:   ID Type Source Tests Collected by Time Destination            2 : Appendix Tissue Appendix SURGICAL PATHOLOGY EXAM (Canceled) Heron Montana DO 9/30/2022 11:52 AM      Findings:   dilated tip,  otherwise normal appendix.  Complications: None.  Implants: * No implants in log *

## 2022-09-30 NOTE — ANESTHESIA POSTPROCEDURE EVALUATION
Patient: Caroline Pride    Procedure: Procedure(s):  NEPHRECTOMY, ROBOT-ASSISTED  APPENDECTOMY, LAPAROSCOPIC       Anesthesia Type:  General    Note:  Disposition: Outpatient   Postop Pain Control: Uneventful            Sign Out: Well controlled pain   PONV: No   Neuro/Psych: Uneventful            Sign Out: Acceptable/Baseline neuro status   Airway/Respiratory: Uneventful            Sign Out: Acceptable/Baseline resp. status   CV/Hemodynamics: Uneventful            Sign Out: Acceptable CV status; No obvious hypovolemia; No obvious fluid overload   Other NRE: NONE   DID A NON-ROUTINE EVENT OCCUR? No           Last vitals:  Vitals Value Taken Time   /80 09/30/22 1400   Temp 36.7  C (98.06  F) 09/30/22 1403   Pulse 85 09/30/22 1403   Resp 18 09/30/22 1335   SpO2 98 % 09/30/22 1403   Vitals shown include unvalidated device data.    Electronically Signed By: Manuel Ang MD  September 30, 2022  2:04 PM

## 2022-09-30 NOTE — OP NOTE
OPERATIVE NOTE  [facility] SURGERY    DATE:   9/30/2022    SURGEON:   Heron Montana D.O.    PRE-OPERATIVE DIAGNOSIS:   H/o Perforated appendicitis    POSTOPERATIVE DIAGNOSIS:   H/o perforated appendicitis.     OPERATION:  Laparoscopic appendectomy.         ANESTHESIA:   General endotracheal.     INDICATIONS FOR PROCEDURE:   Caroline Pride is a 41 year old female who is undergoing left nephrectomy with urology.  Patient has a history of perforated appendicitis and will be undergoing a simultaneous interval appendectomy.     FINDINGS:   Dilated tip, otherwise normal appendix    PROCEDURE:   The patient was brought to the operating room and positioned in the left lateral decubitus position. Nursing and anesthesia assured proper positioning prior to the procedure. A time-out was taken to assure proper patient, site and procedure with all in the operating room.     Urology placed all ports and performed a robotic assisted left nephrectomy.  At the completion of their portion, a laparoscopic appendectomy was performed utilizing their ports.    After placement of all ports the appendix was localized and grasped. The appendix was dilated.  Next, the appendiceal base was clearly identified. We created a window in the appendiceal mesentery at the base of the appendix.  The mesoappendix and appendiceal artery were ligated with Ligasure device. Next, two endoloops were placed around the appendix and suture ligated.  The appendix was transected with Ligasure device.  At this point, the appendix was completely free. It was removed from the abdomen by way of the EndoCatch bag. The appendiceal stump was evaluated and hemostasis was confirmed.   Next,  laparoscopic ports were removed, under direct visualization and Pneumoperitoneum was released. Skin incisions were closed with 4-0 vicryl in a subcuticular fashion, and dermabond was applied.  The patient tolerated the procedure well and was transferred to the  postanesthesia recovery room in stable condition.     Heron Montana,   General Surgery

## 2022-09-30 NOTE — INTERVAL H&P NOTE
"I have reviewed the surgical (or preoperative) H&P that is linked to this encounter, and examined the patient. There are no significant changes    Clinical Conditions Present on Arrival:  Clinically Significant Risk Factors Present on Admission                   # Overweight: Estimated body mass index is 29.73 kg/m  as calculated from the following:    Height as of this encounter: 1.727 m (5' 8\").    Weight as of this encounter: 88.7 kg (195 lb 8.8 oz).       "

## 2022-09-30 NOTE — BRIEF OP NOTE
[facility]    Brief Operative Note    Pre-operative diagnosis: Malignant neoplasm of kidney excluding renal pelvis, left (H) [C64.2] ruptured appendicitis  Post-operative diagnosis Same as pre-operative diagnosis    Procedure: Procedure(s):  NEPHRECTOMY, ROBOT-ASSISTED  APPENDECTOMY, LAPAROSCOPIC  Surgeon: Surgeon(s) and Role:  Panel 1:     * Luis Fernando Sigala MD - Primary     * Sea Caraballo MD - Resident - Assisting     * Tello Sarah MD - Resident - Assisting  Panel 2:     * Heron Montana DO - Primary  Anesthesia: General   Estimated Blood Loss: 22 mL from 9/30/2022  7:45 AM to 9/30/2022 12:24 PM      Drains: None  Specimens:   ID Type Source Tests Collected by Time Destination   1 : Left Kidney Tissue Kidney, Left SURGICAL PATHOLOGY EXAM Luis Fernando Sigala MD 9/30/2022 11:12 AM    2 : Appendix Tissue Appendix SURGICAL PATHOLOGY EXAM Heron Montana DO 9/30/2022 11:52 AM      Findings:   Inflammed appendix.  Complications: None.  Implants: * No implants in log *

## 2022-09-30 NOTE — ANESTHESIA PREPROCEDURE EVALUATION
Anesthesia Pre-Procedure Evaluation    Patient: Caroline Pride   MRN: [id number] : 1980        Procedure : Procedure(s):  NEPHRECTOMY, ROBOT-ASSISTED  APPENDECTOMY, LAPAROSCOPIC          Past Medical History:   Diagnosis Date     Depressive disorder ongoing     PCOS (polycystic ovarian syndrome)      Subclinical hypothyroidism       History reviewed. No pertinent surgical history.   No Known Allergies   Social History     Tobacco Use     Smoking status: Former Smoker     Packs/day: 0.50     Years: 2.00     Pack years: 1.00     Quit date: 2019     Years since quitting: 3.7     Smokeless tobacco: Never Used   Substance Use Topics     Alcohol use: Yes     Comment: occasional      Wt Readings from Last 1 Encounters:   22 88.7 kg (195 lb 8.8 oz)        Anesthesia Evaluation            ROS/MED HX  ENT/Pulmonary:  - neg pulmonary ROS     Neurologic:  - neg neurologic ROS     Cardiovascular:  - neg cardiovascular ROS     METS/Exercise Tolerance:     Hematologic:  - neg hematologic  ROS     Musculoskeletal:  - neg musculoskeletal ROS     GI/Hepatic:  - neg GI/hepatic ROS     Renal/Genitourinary:  - neg Renal ROS     Endo:  - neg endo ROS     Psychiatric/Substance Use:  - neg psychiatric ROS     Infectious Disease:  - neg infectious disease ROS     Malignancy:  - neg malignancy ROS     Other:  - neg other ROS          Physical Exam    Airway  airway exam normal           Respiratory Devices and Support         Dental  no notable dental history         Cardiovascular   cardiovascular exam normal          Pulmonary   pulmonary exam normal                OUTSIDE LABS:  CBC:   Lab Results   Component Value Date    WBC 7.8 2022    WBC 7.3 2022    HGB 12.2 2022    HGB 12.7 2022    HCT 36.8 2022    HCT 37.9 2022     2022     2022     BMP:   Lab Results   Component Value Date     2022     2022    POTASSIUM 4.4 2022     POTASSIUM 4.2 08/17/2022    CHLORIDE 105 09/16/2022    CHLORIDE 108 08/17/2022    CO2 24 09/16/2022    CO2 21 08/17/2022    BUN 19.9 09/16/2022    BUN 16 08/17/2022    CR 0.69 09/16/2022    CR 0.60 08/17/2022     (H) 09/30/2022    GLC 96 09/16/2022     COAGS:   Lab Results   Component Value Date    INR 1.27 (H) 06/23/2022     POC:   Lab Results   Component Value Date    HCGS Negative 06/23/2022     HEPATIC:   Lab Results   Component Value Date    ALBUMIN 3.8 08/17/2022    PROTTOTAL 8.1 08/17/2022    ALT 20 08/17/2022    AST 4 08/17/2022    ALKPHOS 57 08/17/2022    BILITOTAL 0.4 08/17/2022     OTHER:   Lab Results   Component Value Date    LACT 0.6 (L) 06/23/2022    A1C 5.0 10/22/2019    LAURE 9.6 09/16/2022    MAG 2.2 06/22/2022    TSH 1.63 08/17/2022    T4 1.01 12/06/2017    CRP 4.9 08/17/2022    SED 85 (H) 06/22/2022       Anesthesia Plan    ASA Status:  2   NPO Status:  NPO Appropriate    Anesthesia Type: General.     - Airway: ETT   Induction: Intravenous.   Maintenance: Balanced.        Consents    Anesthesia Plan(s) and associated risks, benefits, and realistic alternatives discussed. Questions answered and patient/representative(s) expressed understanding.     - Discussed: Risks, Benefits and Alternatives for BOTH SEDATION and the PROCEDURE were discussed     - Discussed with:  Patient      - Extended Intubation/Ventilatory Support Discussed: Yes.      - Patient is DNR/DNI Status: No    Use of blood products discussed: Yes.     Postoperative Care    Pain management: IV analgesics, Peripheral nerve block (Single Shot).   PONV prophylaxis: Ondansetron (or other 5HT-3), Dexamethasone or Solumedrol, Background Propofol Infusion     Comments:                Sea Casas MD, MD

## 2022-10-01 VITALS
HEIGHT: 68 IN | DIASTOLIC BLOOD PRESSURE: 87 MMHG | SYSTOLIC BLOOD PRESSURE: 139 MMHG | OXYGEN SATURATION: 96 % | HEART RATE: 86 BPM | TEMPERATURE: 99.2 F | BODY MASS INDEX: 29.64 KG/M2 | WEIGHT: 195.55 LBS | RESPIRATION RATE: 16 BRPM

## 2022-10-01 LAB
ANION GAP SERPL CALCULATED.3IONS-SCNC: 10 MMOL/L (ref 7–15)
BUN SERPL-MCNC: 17.4 MG/DL (ref 6–20)
CALCIUM SERPL-MCNC: 8.5 MG/DL (ref 8.6–10)
CHLORIDE SERPL-SCNC: 106 MMOL/L (ref 98–107)
CREAT SERPL-MCNC: 1.06 MG/DL (ref 0.51–0.95)
DEPRECATED HCO3 PLAS-SCNC: 22 MMOL/L (ref 22–29)
ERYTHROCYTE [DISTWIDTH] IN BLOOD BY AUTOMATED COUNT: 13.3 % (ref 10–15)
GFR SERPL CREATININE-BSD FRML MDRD: 67 ML/MIN/1.73M2
GLUCOSE SERPL-MCNC: 111 MG/DL (ref 70–99)
HCT VFR BLD AUTO: 33.3 % (ref 35–47)
HGB BLD-MCNC: 10.8 G/DL (ref 11.7–15.7)
MCH RBC QN AUTO: 28.6 PG (ref 26.5–33)
MCHC RBC AUTO-ENTMCNC: 32.4 G/DL (ref 31.5–36.5)
MCV RBC AUTO: 88 FL (ref 78–100)
PLATELET # BLD AUTO: 303 10E3/UL (ref 150–450)
POTASSIUM SERPL-SCNC: 4 MMOL/L (ref 3.4–5.3)
RBC # BLD AUTO: 3.77 10E6/UL (ref 3.8–5.2)
SODIUM SERPL-SCNC: 138 MMOL/L (ref 136–145)
WBC # BLD AUTO: 12.8 10E3/UL (ref 4–11)

## 2022-10-01 PROCEDURE — 85027 COMPLETE CBC AUTOMATED: CPT | Performed by: STUDENT IN AN ORGANIZED HEALTH CARE EDUCATION/TRAINING PROGRAM

## 2022-10-01 PROCEDURE — 82310 ASSAY OF CALCIUM: CPT | Performed by: STUDENT IN AN ORGANIZED HEALTH CARE EDUCATION/TRAINING PROGRAM

## 2022-10-01 PROCEDURE — 36415 COLL VENOUS BLD VENIPUNCTURE: CPT | Performed by: STUDENT IN AN ORGANIZED HEALTH CARE EDUCATION/TRAINING PROGRAM

## 2022-10-01 PROCEDURE — 250N000013 HC RX MED GY IP 250 OP 250 PS 637: Performed by: STUDENT IN AN ORGANIZED HEALTH CARE EDUCATION/TRAINING PROGRAM

## 2022-10-01 RX ORDER — SENNOSIDES 8.6 MG
1 TABLET ORAL DAILY
Qty: 14 TABLET | Refills: 0 | Status: SHIPPED | OUTPATIENT
Start: 2022-10-01 | End: 2022-10-15

## 2022-10-01 RX ORDER — OXYCODONE HYDROCHLORIDE 5 MG/1
5 TABLET ORAL EVERY 6 HOURS PRN
Qty: 12 TABLET | Refills: 0 | Status: SHIPPED | OUTPATIENT
Start: 2022-10-01 | End: 2022-10-04

## 2022-10-01 RX ORDER — ACETAMINOPHEN 325 MG/1
975 TABLET ORAL 4 TIMES DAILY
Qty: 180 TABLET | Refills: 0 | Status: SHIPPED | OUTPATIENT
Start: 2022-10-01 | End: 2023-08-24

## 2022-10-01 RX ADMIN — GABAPENTIN 100 MG: 100 CAPSULE ORAL at 14:15

## 2022-10-01 RX ADMIN — ACETAMINOPHEN 975 MG: 325 TABLET, FILM COATED ORAL at 09:43

## 2022-10-01 RX ADMIN — ACETAMINOPHEN 975 MG: 325 TABLET, FILM COATED ORAL at 00:06

## 2022-10-01 RX ADMIN — OXYCODONE HYDROCHLORIDE 5 MG: 5 TABLET ORAL at 00:06

## 2022-10-01 RX ADMIN — POLYETHYLENE GLYCOL 3350 17 G: 17 POWDER, FOR SOLUTION ORAL at 09:44

## 2022-10-01 RX ADMIN — SENNOSIDES AND DOCUSATE SODIUM 1 TABLET: 50; 8.6 TABLET ORAL at 00:06

## 2022-10-01 RX ADMIN — GABAPENTIN 100 MG: 100 CAPSULE ORAL at 09:44

## 2022-10-01 RX ADMIN — OXYCODONE HYDROCHLORIDE 5 MG: 5 TABLET ORAL at 06:56

## 2022-10-01 RX ADMIN — SENNOSIDES AND DOCUSATE SODIUM 1 TABLET: 50; 8.6 TABLET ORAL at 09:44

## 2022-10-01 RX ADMIN — OXYCODONE HYDROCHLORIDE 10 MG: 10 TABLET ORAL at 16:30

## 2022-10-01 RX ADMIN — GABAPENTIN 100 MG: 100 CAPSULE ORAL at 00:06

## 2022-10-01 RX ADMIN — OXYCODONE HYDROCHLORIDE 10 MG: 10 TABLET ORAL at 12:34

## 2022-10-01 RX ADMIN — ACETAMINOPHEN 975 MG: 325 TABLET, FILM COATED ORAL at 14:15

## 2022-10-01 ASSESSMENT — ACTIVITIES OF DAILY LIVING (ADL)
ADLS_ACUITY_SCORE: 24
ADLS_ACUITY_SCORE: 18
ADLS_ACUITY_SCORE: 24
ADLS_ACUITY_SCORE: 18

## 2022-10-01 NOTE — PROGRESS NOTES
"/75 (BP Location: Left arm)   Pulse 81   Temp 98.6  F (37  C) (Oral)   Resp 16   Ht 1.727 m (5' 8\")   Wt 88.7 kg (195 lb 8.8 oz)   LMP 09/30/2022 (Approximate)   SpO2 97%   BMI 29.73 kg/m          Status: POD# 1 NEPHRECTOMY, ROBOT-ASSISTED  APPENDECTOMY, LAPAROSCOPIC  Neuro: A/Ox4 calls appropriately  Respiratory: RA denies SOB  Cardiac: WDL, denies cardiac chest pain   GI/: voiding adequetly  Diet: Regular diet  Incisions/Drains: 5 left abdominal  incision site, covered with liquid bandage CDI.   Skin: WNL  IV Access: Left PIV infusing 75 mL /hr   Activity:  SBA  Plan:  Possible discharge           "

## 2022-10-01 NOTE — PROGRESS NOTES
"Urology  Progress Note    NAEO  Pain well controlled with oral and IV prns  Tolerating solid food - no n/v  Not passing gas  Ambulating  Ohara removed and urinated without issues     Exam  /80 (BP Location: Left arm)   Pulse 73   Temp 98.2  F (36.8  C) (Oral)   Resp 16   Ht 1.727 m (5' 8\")   Wt 88.7 kg (195 lb 8.8 oz)   LMP 09/30/2022 (Approximate)   SpO2 95%   BMI 29.73 kg/m    No acute distress  Unlabored breathing  Abdomen soft,  appropriately tender, nondistended. Incisions c/d/i covered in glue. Extraction site with moderate bruising   /1075  Emesis: 250    Labs  Recent Labs   Lab Test 09/30/22  1722 09/16/22  1248 08/17/22  0925   WBC 16.5* 7.8 7.3   HGB 11.5* 12.2 12.7   CR 0.92 0.69 0.60      AM labs pending    Assessment/Plan  41 year old y/o female POD#1 s/p robotic left nephrectomy and appendectomy      Neuro: Tylenol, prn oxy/dilaudid for pain control. PTA gabapentin   CV: SALMA  Pulm: IS while awake  FEN/GI: Regular diet. Bowel regimen.  Endo: SALMA  : ohara to be removed   Heme/ID: Hgb stable  Activity: Up ad rukhsana  PPx: SCDs.   Dispo: med/surg; discharge today    Seen and examined with the chief resident. Will discuss with Dr. Sigala.    Tello Sarah MD  Urology Resident     Contacting the Urology Team     Please use the following job codes to reach the Urology Team. Note that you must use an in house phone and that job codes cannot receive text pages.     On weekdays, dial [phone] (or star-star-star [phone] on the new [employer] telephones) then [phone] to reach the Adult Urology resident or PA on call    On weekdays, dial [phone] (or star-star-star [phone] on the new [employer] telephones) then [phone] to reach the Pediatric Urology resident    On weeknights and weekends, dial [phone] (or star-star-star [phone] on the new [employer] telephones) then [phone] to reach the Urology resident on call (for both Adult and Pediatrics)              "

## 2022-10-01 NOTE — PLAN OF CARE
Neuro: A&O x4, anxious. No deficits noted.   Respiratory: Sats well on RA, denies SOB.   Cardiac: WDL, denies cardiac chest pain/symptoms.    GI/: Voiding spontaneously adequately. +BS, +flatus  Diet: Regular diet.   Pain/Nausea: oxycodone given for pain. Denies nausea.   Incisions/Drains: lap sites x5, chris.   IV Access: PIV x2 removed.   Labs: reviewed  Activity: SBA  Plan: Discharged home.       Discharge paperwork was reviewed with patient. Pt expressed understanding. A copy was given to patient. Pt discharging home. Wife at bedside and will transport. Discharge medications picked up from pharmacy by rn staff. Incentive spirometer given to patient. All patient belongings were taken with patient.

## 2022-10-01 NOTE — PLAN OF CARE
Goal Outcome Evaluation: ongoing.     Plan of Care Reviewed With: patient     Overall Patient Progress: improving      Status: POD# 1 NEPHRECTOMY, ROBOT-ASSISTED  APPENDECTOMY, LAPAROSCOPIC  Neuro: A/Ox4 calls appropriately, can be anxious at times.   Respiratory: currently on RA. capno off this AM. Denies any SOB.   Cardiac: WDL, denies cardiac any chest pain   GI/: Ohara removed this AM per order on POD #1- ohara removed around 0655.   Diet: Regular diet  Incisions/Drains: 5 left abdominal  incision site, covered with liquid bandage CDI.  Skin: no new issues.   IV Access: Left PIV SL.   Activity:  Ax1.   Plan:  Continue to monitor and follow POC

## 2022-10-03 ENCOUNTER — PATIENT OUTREACH (OUTPATIENT)
Dept: SURGERY | Facility: CLINIC | Age: 42
End: 2022-10-03

## 2022-10-03 NOTE — PROGRESS NOTES
10/03/22    10:12 AM     Caroline Pride is a patient of Dr. Heron Montana that underwent laparoscopic appendectomy approximately 3 days ago (9/30).  Attempted to contact patient via telephone for a status update and review post-op  teaching.  LM on VM to call office.  Await return call.      Of note:  Pathology:  Pending  Wound:  Skin Sealant  Follow-up:  Routine with General Surgery, has follow-up appointment scheduled with Urology on 10/12.  Restrictions:  - No lifting, pushing, pulling more than 10lb x 6 weeks  New medications:  Oxycodone, Tylenol, Senna (prescribed by the Urology Team)  Equipment/Supplies:  None

## 2022-10-03 NOTE — DISCHARGE SUMMARY
Discharge Summary     Caroline Pride MRN# [id number]   YOB: 1980 Age: 41 year old     Date of Admission:  9/30/2022  Date of Discharge::  10/1/2022  6:00 PM  Admitting Physician:  Luis Fernando Sigala MD  Discharge Physician:  Tello Sarah MD  Primary Care Physician:         Giovanna Mistry          Admission Diagnoses:   Malignant neoplasm of kidney excluding renal pelvis, left (H) [C64.2]  Left renal mass [N28.89]            Discharge Diagnosis:   Same as above           Procedures:   : Procedure(s):  NEPHRECTOMY, ROBOT-ASSISTED  APPENDECTOMY, LAPAROSCOPIC        Non-operative procedures:   None performed          Consultations:   None           Imaging Studies:     Results for orders placed or performed in visit on 08/19/22   XR Chest 2 Views    Narrative    Exam:  Chest 2 view    History: Malignant neoplasm of kidney excluding renal pelvis.    Indication: Staging chest x-ray.    Comparison: CT abdomen and pelvis, 7/26/2022.    Findings: PA and lateral upright views of the chest. The trachea is  midline. Cardiomediastinal silhouette is unremarkable. No evidence of  focal opacity or consolidation. Costophrenic angles are clear, no  pneumothorax. Apparent old fracture of the left mid clavicle with  chronic-appearing deformity. No other significant osseous lesions. The  visualized upper abdomen is unremarkable.      Impression    Impression:    1. Negative chest   2. Chronic appearing deformity of the left clavicle.    I have personally reviewed the examination and initial interpretation  and I agree with the findings.    CHANG CHAUDHARY MD         SYSTEM ID:  [id number]            Medications Prior to Admission:     No medications prior to admission.            Discharge Medications:     Discharge Medication List as of 10/1/2022  5:04 PM      START taking these medications    Details   acetaminophen (TYLENOL) 325 MG tablet Take 3 tablets (975 mg) by mouth 4 times daily,  Disp-180 tablet, R-0, E-Prescribe      oxyCODONE (ROXICODONE) 5 MG tablet Take 1 tablet (5 mg) by mouth every 6 hours as needed for pain, Disp-12 tablet, R-0, Local Print      sennosides (SENOKOT) 8.6 MG tablet Take 1 tablet by mouth daily for 14 days, Disp-14 tablet, R-0, E-Prescribe         CONTINUE these medications which have NOT CHANGED    Details   IRON PO Historical      ketoconazole (NIZORAL) 2 % external cream Apply topically dailyDisp-60 g, [id number]      Omega-3 Fatty Acids (FISH OIL PO) Historical      valACYclovir (VALTREX) 1000 mg tablet Take 2 tablets (2,000 mg) by mouth 2 times daily, Disp-12 tablet, R-6, E-Prescribe                    Brief History of Illness:   Reason for admission requiring a surgical or invasive procedure:   Malignant neoplasm of kidney excluding renal pelvis, left (H) [C64.2]   The patient underwent the following procedure(s):   See above   There were no immediate complications during this procedure.    Please refer to the full operative summary for details.           Hospital Course:   The patient's hospital course was unremarkable.  Caroline Pride recovered as anticipated and experienced no post-operative complications.      On POD#1 patient was ambulating without assitance, ohara was removed and she was voiding independently, tolerating a regular diet, had pain controlled with PO medications to go home with, and requiring no IV medications or fluids. Patient was discharged home with appropriate contact information, follow-up and instructions as seen below in the discharge paperwork.         Final Pathology Result:   Pending at time of discharge         Discharge Instructions and Follow-Up:     Discharge Procedure Orders   Reason for your hospital stay   Order Comments: Nephrectomy     Activity   Order Comments: Your activity upon discharge: activity as tolerated     Order Specific Question Answer Comments   Is discharge order? Yes      Discharge Instructions      Reason for your hospital stay   Order Comments: Robotic nephrectomy     Activity   Order Comments: Your activity upon discharge: activity as tolerated     Order Specific Question Answer Comments   Is discharge order? Yes      Discharge Instructions   Order Comments: Activity  - No strenuous exercise for 6 weeks.  - No lifting, pushing, pulling more than 10 pounds for 6 weeks.   - Do not strain with bowel movements.  - Do not drive until you can press the brake pedal quickly and fully without pain.   - Do not operate a motor vehicle while taking narcotic pain medications.     Diet  You may resume your regular post-procedure diet  Many patients do not regain their full appetite for several weeks after surgery  Take it slow, eating small meals frequently  If you notice you are passing less gas or feeling bloated, stop eating solid foods and stick to liquids for the next several hours until you begin to pass gas again.  You are not required to have a bowel movement prior to leaving the hospital. Some patients take several days for bowel movements to return due to anesthesia and pain medications.     Incisions  - You may shower and get incisions wet starting 48 hrs after surgery.  - Do not scrub incisions or submerge wounds for 2 weeks or until seen in follow-up.   - Remove wound dressing 48 hours after surgery.   - If purple dermabond glue was used, avoid applying any lotions or ointments.   - If steri-strips were used, they will fall off on their own.   - Leave incision open to air. Cover with gauze only if needed for comfort or to protect clothing from drainage.   - The stitches do not need to be removed, they will dissolve on their own.    Medications  - Transition from narcotic pain medications to tylenol (acetaminophen) as you are able.  Wean yourself off all pain medications as you are able.  - Some pain medications contain both tylenol (acetaminophen) and a narcotic (Norco, vicodin, percocet), do not take  "more than 4,000mg of Tylenol (acetaminophen) from all sources in any 24 hour period.  - Narcotics can make you constipated.  Take over the counter fiber (metamucil or benefiber) and stool softeners (miralax, docusate or senna) while taking narcotic pain medications, but stop if you develop diarrhea.  - No driving or operating machinery while taking narcotic pain medications     Follow-Up:  - Follow up with Dr. Sigala on 10/12/22.  - Call or return sooner than your regularly scheduled visit if you develop any of the following: fever (greater than 101.5), uncontrolled pain, uncontrolled nausea or vomiting, or inability to urinate.    Phone numbers:   - Monday through Friday 8am to 4:30pm: Call [phone] with questions, requests for medication refills, or to schedule or confirm an appointment.  - Nights or weekends: call the after hours emergency pager - [phone] and tell the  \"I would like to page the Urology Resident on call.\" Please note, due to prescribing laws, resident physicians are unable to prescribe narcotics after-hours. If you feel as though you will need a refill of a narcotic pain medication, you will need to call the clinic during business hours OR seek emergency care.  - For emergencies, call [phone]     Diet   Order Comments: Follow this diet upon discharge: Orders Placed This Encounter      Regular Diet Adult     Order Specific Question Answer Comments   Is discharge order? Yes      Diet   Order Comments: Follow this diet upon discharge: Orders Placed This Encounter      Regular Diet Adult     Order Specific Question Answer Comments   Is discharge order? Yes             Discharge Disposition:     Discharged to Home      Condition at discharge: Good    Patient to follow-up with Dr. Sigala 10/12/22 for path review     --    Tello Sarah MD  Urology Resident    11:31 AM, 10/3/2022    "

## 2022-10-04 ENCOUNTER — NURSE TRIAGE (OUTPATIENT)
Dept: SURGERY | Facility: CLINIC | Age: 42
End: 2022-10-04

## 2022-10-04 ENCOUNTER — PRE VISIT (OUTPATIENT)
Dept: UROLOGY | Facility: CLINIC | Age: 42
End: 2022-10-04

## 2022-10-04 DIAGNOSIS — Z90.5 S/P NEPHRECTOMY: Primary | ICD-10-CM

## 2022-10-04 RX ORDER — OXYCODONE HYDROCHLORIDE 5 MG/1
5 TABLET ORAL EVERY 6 HOURS PRN
Qty: 12 TABLET | Refills: 0 | Status: SHIPPED | OUTPATIENT
Start: 2022-10-04 | End: 2022-10-07

## 2022-10-04 NOTE — TELEPHONE ENCOUNTER
Spoke to pt. Informed her that order will be refilled and sent to preferred pharmacy. Pt verbalized understanding.      Georgia Ibarra RN MSN

## 2022-10-04 NOTE — TELEPHONE ENCOUNTER
Hartl, Sandra Maribeth, RN  [facility]  Caller: Unspecified (Today, 10:37 AM)  The General Surgery Team is deferring this message/refill to the Urology Surgeon.     Thank you,   Sandra Vasquez RN, BSN, CNOR, RNFA   [facility] General Surgery     Spoke to pt. Pt clarifies that she is having pain in abdominal area and in bilateral shoulder. Pt is able to eat and drink ok. Had 1 BM yesterday, normal. Pt is also passing flatus. Pt is able to get up and walk around ok. Pt has 2 oxycodone left, taking every 6-7 hours. Pt is also taking tylenol and senna. Pt is also urinating well. Pt states that she is planning a car ride to [place] for  assistance and would like something to help with comfort.     Georgia Ibarra, RN MSN

## 2022-10-04 NOTE — PROGRESS NOTES
RN Post-Op/Post-Discharge Care Coordination Note    Ms. Caroline Pride is a 41 year old female who underwent laparoscopic appendectomy on 9/30 with Dr. Heron [place]. Spoke with Patient.    Support  Family member assisting with care     Health Status  Nausea/Vomiting: Patient denies nausea/vomiting.  Eating/drinking: Patient is able to eat and drink without any complaints.  Bowel habits: Patient reports having a normal bowel movement.  Drains (ADY): N/A  Fevers/chills: Patient denies any fever or chills.  Incisions: Patient denies any signs and symptoms of infection.  Wound closure: Skin Sealant  Pain: tolerable, using oxycodone and Tylenol per bottle instructions. Also using ice and heat packs.   New Medications:  Oxycodone, Tylenol, Senna (all prescribed by the Urology Team). Advised patient to call the Urology team if needing refills.  Urinary: voiding spontaneously. Urine clear yellow in color. No concerns at this time.    Activity/Restrictions  Other no lifting, pushing, or pulling in excess of 10lb x 6 weeks    Equipment  None    Pathology reviewed with patient:  No, not yet available    All of her questions were answered including reviewing restrictions and wound care.  She will call this office if she has any further questions and/or concerns.      Patient has a follow-up appointment scheduled with Urology on 10/12. In lieu of a post-op clinic visit, patient would like to be contacted in approximately 7-10 days via telephone.    A copy of this note was routed to the primary surgeon.      Whom and When to Call  Patient acknowledges understanding of how to manage any medication changes and when to seek medical care.     Patient advised that if after hour medical concerns arise to please call [phone] and choose option 4 to speak to the physician on call.

## 2022-10-04 NOTE — TELEPHONE ENCOUNTER
Triage Call  S/P 9/30/22  H. LISSET VARGAS  NEPHRECTOMY, ROBOT-ASSISTED  APPENDECTOMY, LAPAROSCOPIC    CALL: PT REPORTS PAIN @ 7/8 AFTER OXYCODONE 5MG WEARS OFF ( TAKING EVERY 6 HOURS). PT REQUEST REFILL @ [address] [address]  PLEASE CALL PT @ [phone] (Mobile)      Reason for Disposition    [1] SEVERE post-op pain (e.g., excruciating, pain scale 8-10) AND [2] not controlled with pain medications    Additional Information    Negative: Sounds like a life-threatening emergency to the triager    Negative: Chest pain    Negative: Difficulty breathing    Negative: Acting confused (e.g., disoriented, slurred speech) or excessively sleepy    Negative: Post-Op tonsil and adenoid surgery, symptoms or questions about    Negative: Surgical incision symptoms and questions    Negative: [1] Pain or burning with passing urine (urination) AND [2] male    Negative: [1] Pain or burning with passing urine (urination) AND [2] female    Negative: Constipation    Negative: New or worsening leg (calf, thigh) pain    Negative: New or worsening leg swelling    Negative: Dizziness is severe, or persists > 24 hours after surgery    Negative: Pain, redness, swelling, or pus at IV Site    Negative: Symptoms arising from use of a urinary catheter (e.g., coude, Black)    Negative: Cast problems or questions    Negative: Medication question    Negative: [1] Widespread rash AND [2] bright red, sunburn-like    Negative: [1] SEVERE headache AND [2] after spinal (epidural) anesthesia    Negative: [1] Vomiting AND [2] persists > 4 hours    Negative: [1] Vomiting AND [2] abdomen looks much more swollen than usual    Negative: [1] Drinking very little AND [2] dehydration suspected (e.g., no urine > 12 hours, very dry mouth, very lightheaded)    Negative: Patient sounds very sick or weak to the triager    Negative: Sounds like a serious complication to the triager    Negative: Fever > 100.4 F (38.0  "C)    Answer Assessment - Initial Assessment Questions  1. SYMPTOM: \"What's the main symptom you're concerned about?\" (e.g., pain, fever, vomiting)   PAIN MGMT   REQUEST RF  oxyCODONE (ROXICODONE) 5 MG  2. ONSET: \"When did start?\" TAKING MED / OXY EVERY 6 HRS  PAIN @ 6/7 WHEN WEARS OFF         3. SURGERY: \"What surgery did you have?\"   NEPHRECTOMY, ROBOT-ASSISTED  APPENDECTOMY, LAPAROSCOPIC    4. DATE of SURGERY: \"When was the surgery?\"   9/30/22      5. ANESTHESIA: \" What type of anesthesia did you have?\" (e.g., general, spinal, epidural, local)    6. PAIN: \"Is there any pain?\" If Yes, ask: \"How bad is it?\"  (Scale 1-10; or mild, moderate, severe) TAKING MED / OXY EVERY 6 HRS  PAIN @ 6/7 WHEN WEARS OFF        7. FEVER: \"Do you have a fever?\" If Yes, ask: \"What is your temperature, how was it measured, and when did it start?\"    NO  8. VOMITING: \"Is there any vomiting?\" If Yes, ask: \"How many times?\"   NO  9. BLEEDING: \"Is there any bleeding?\" If Yes, ask: \"How much?\" and \"Where?\"    NO  10. OTHER SYMPTOMS: \"Do you have any other symptoms?\" (e.g., drainage from wound, painful urination, constipation)  NO    Protocols used: POST-OP SYMPTOMS AND [id number]      "

## 2022-10-04 NOTE — CONFIDENTIAL NOTE
Reason for visit: post-op nephrectomy     Relevant information: hx left renal cancer    Records/imaging/labs/orders: path in process    At Rooming: carl Prakash   10/4/2022  10:56 AM

## 2022-10-11 ENCOUNTER — PATIENT OUTREACH (OUTPATIENT)
Dept: SURGERY | Facility: CLINIC | Age: 42
End: 2022-10-11

## 2022-10-11 LAB
PATH REPORT.COMMENTS IMP SPEC: ABNORMAL
PATH REPORT.COMMENTS IMP SPEC: ABNORMAL
PATH REPORT.COMMENTS IMP SPEC: YES
PATH REPORT.FINAL DX SPEC: ABNORMAL
PATH REPORT.GROSS SPEC: ABNORMAL
PATH REPORT.MICROSCOPIC SPEC OTHER STN: ABNORMAL
PATH REPORT.RELEVANT HX SPEC: ABNORMAL
PATHOLOGY SYNOPTIC REPORT: ABNORMAL
PHOTO IMAGE: ABNORMAL

## 2022-10-11 PROCEDURE — 88307 TISSUE EXAM BY PATHOLOGIST: CPT | Mod: 26 | Performed by: PATHOLOGY

## 2022-10-11 PROCEDURE — 88304 TISSUE EXAM BY PATHOLOGIST: CPT | Mod: 26 | Performed by: PATHOLOGY

## 2022-10-11 NOTE — PROGRESS NOTES
Caroline Pride was contacted several days post procedure via telephone for a status update and elected to have a telephone follow-up call approximately 7-10 days after original contact in lieu of a clinic visit with Dr. Heron [place].  She continues to do well and the skin glue  has peeled off. The patients wounds are healed and the area is C/D/I.  She is afebrile, pain free, and karen po; the patient is slowly resuming her normal activity.   Discussed restrictions including no lifting in excess of 10 pounds for 5 more weeks.    Pathology was reviewed with the patient: No, not yet available.     All of Caroline Pride question's were answered and  she would like to return to the clinic on a PRN basis. The patient is aware that  she may schedule a post op appointment at any time. She has an appointment with Urology tomorrow.     A copy of this note was routed to the patients surgeon.

## 2022-10-12 ENCOUNTER — OFFICE VISIT (OUTPATIENT)
Dept: UROLOGY | Facility: CLINIC | Age: 42
End: 2022-10-12
Payer: COMMERCIAL

## 2022-10-12 VITALS
BODY MASS INDEX: 28.95 KG/M2 | HEART RATE: 112 BPM | DIASTOLIC BLOOD PRESSURE: 79 MMHG | SYSTOLIC BLOOD PRESSURE: 110 MMHG | HEIGHT: 68 IN | WEIGHT: 191 LBS

## 2022-10-12 DIAGNOSIS — C64.2 MALIGNANT NEOPLASM OF KIDNEY EXCLUDING RENAL PELVIS, LEFT (H): Primary | ICD-10-CM

## 2022-10-12 PROCEDURE — 99024 POSTOP FOLLOW-UP VISIT: CPT | Performed by: UROLOGY

## 2022-10-12 NOTE — PATIENT INSTRUCTIONS
Please follow up with Dr. Sigala in 6 months with a CT and labs prior    It was a pleasure meeting with you today.  Thank you for allowing me and my team the privilege of caring for you today.  YOU are the reason we are here, and I truly hope we provided you with the excellent service you deserve.  Please let us know if there is anything else we can do for you so that we can be sure you are leaving completely satisfied with your care experience.

## 2022-10-12 NOTE — LETTER
"10/12/2022       RE: Caroline Pride  [address]   [address]  [facility] [address]     Dear Colleague,    Thank you for referring your patient, Caroline Pride, to the [facility] UROLOGY CLINIC [place] at [facility]. Please see a copy of my visit note below.    Urology Clinic     HPI   Caroline Pride is a very pleasant 41 year old female who initially presented to [facility] with a ruptured appendicitis and phlegmon almost 3 month ago which was managed with IV antibiotic with appropriate response. During the work up, she was found to have a 5 cm left renal mass and therefore she was referred to me for further evaluation.     After discussing the options, she opted to undergo left robotic radical nephrectomy combined with laparoscopic appendectomy which was done on 09/30/2022. She did well postoperatively and is here for follow up. She does not have any major complaints. One of the port closure sites appeared red and inflamed to her but otherwise pain is reasonably controlled. She reports normal appetite and regular bowel movement. She has been feeling tired since being diagnosed with ruptured appendicitis and has not regained her energy and stamina yet.     The patient denies any fever, nausea or vomiting.     PHYSICAL EXAM  /79   Pulse 112   Ht 1.727 m (5' 8\")   Wt 86.6 kg (191 lb)   LMP 09/30/2022 (Approximate)   BMI 29.04 kg/m     Constitutional: AO, pleasant, NAD  Resp: Non-labored breathing on room air  Abd: Soft, NT, ND, incisions are well healed   Ext WWP     Labs  Lab Results   Component Value Date    WBC 12.8 10/01/2022    WBC 11.4 05/13/2020     Lab Results   Component Value Date    RBC 3.77 10/01/2022    RBC 3.95 05/13/2020     Lab Results   Component Value Date    HGB 10.8 10/01/2022    HGB 10.9 05/15/2020     Lab Results   Component Value Date    HCT 33.3 10/01/2022    HCT 35.2 05/13/2020     No components " found for: MCT  Lab Results   Component Value Date    MCV 88 10/01/2022    MCV 89 05/13/2020     Lab Results   Component Value Date    MCH 28.6 10/01/2022    MCH 30.4 05/13/2020     Lab Results   Component Value Date    MCHC 32.4 10/01/2022    MCHC 34.1 05/13/2020     Lab Results   Component Value Date    RDW 13.3 10/01/2022    RDW 13.9 05/13/2020     Lab Results   Component Value Date     10/01/2022     05/13/2020        Last Comprehensive Metabolic Panel:  Sodium   Date Value Ref Range Status   10/01/2022 138 136 - 145 mmol/L Final   12/06/2017 141 133 - 144 mmol/L Final     Potassium   Date Value Ref Range Status   10/01/2022 4.0 3.4 - 5.3 mmol/L Final   08/17/2022 4.2 3.4 - 5.3 mmol/L Final   12/06/2017 4.2 3.4 - 5.3 mmol/L Final     Chloride   Date Value Ref Range Status   10/01/2022 106 98 - 107 mmol/L Final   08/17/2022 108 94 - 109 mmol/L Final   12/06/2017 108 94 - 109 mmol/L Final     Carbon Dioxide   Date Value Ref Range Status   12/06/2017 24 20 - 32 mmol/L Final     Carbon Dioxide (CO2)   Date Value Ref Range Status   10/01/2022 22 22 - 29 mmol/L Final   08/17/2022 21 20 - 32 mmol/L Final     Anion Gap   Date Value Ref Range Status   10/01/2022 10 7 - 15 mmol/L Final   08/17/2022 8 3 - 14 mmol/L Final   12/06/2017 9 3 - 14 mmol/L Final     Glucose   Date Value Ref Range Status   10/01/2022 111 (H) 70 - 99 mg/dL Final   08/17/2022 104 (H) 70 - 99 mg/dL Final   12/06/2017 112 (H) 70 - 99 mg/dL Final     GLUCOSE BY METER POCT   Date Value Ref Range Status   09/30/2022 120 (H) 70 - 99 mg/dL Final     Urea Nitrogen   Date Value Ref Range Status   10/01/2022 17.4 6.0 - 20.0 mg/dL Final   08/17/2022 16 7 - 30 mg/dL Final   12/06/2017 13 7 - 30 mg/dL Final     Creatinine   Date Value Ref Range Status   10/01/2022 1.06 (H) 0.51 - 0.95 mg/dL Final   05/13/2020 0.48 (L) 0.52 - 1.04 mg/dL Final     GFR Estimate   Date Value Ref Range Status   10/01/2022 67 >60 mL/min/1.73m2 Final     Comment:      Effective December 21, 2021 eGFRcr in adults is calculated using the 2021 CKD-EPI creatinine equation which includes age and gender (Robin et al., NEJM, DOI: 10.1056/VRPIox7736451)   05/13/2020 >90 >60 mL/min/[1.73_m2] Final     Comment:     Non  GFR Calc  Starting 12/18/2018, serum creatinine based estimated GFR (eGFR) will be   calculated using the Chronic Kidney Disease Epidemiology Collaboration   (CKD-EPI) equation.       Calcium   Date Value Ref Range Status   10/01/2022 8.5 (L) 8.6 - 10.0 mg/dL Final   12/06/2017 9.0 8.5 - 10.1 mg/dL Final     Bilirubin Total   Date Value Ref Range Status   08/17/2022 0.4 0.2 - 1.3 mg/dL Final   12/06/2017 0.4 0.2 - 1.3 mg/dL Final     Alkaline Phosphatase   Date Value Ref Range Status   08/17/2022 57 40 - 150 U/L Final   12/06/2017 52 40 - 150 U/L Final     ALT   Date Value Ref Range Status   08/17/2022 20 0 - 50 U/L Final   05/13/2020 18 0 - 50 U/L Final     AST   Date Value Ref Range Status   08/17/2022 4 0 - 45 U/L Final   05/13/2020 5 0 - 45 U/L Final     Surgical pathology     Case: [id number]                                   Authorizing Provider:  Luis Fernando Sigala MD          Collected:           09/30/2022 11:12 AM           Ordering Location:      MAIN OR                 Received:            09/30/2022 12:58 PM           Pathologist:           Jolene Gomez MD                                                    Specimens:   A) - Kidney, Left, Left Kidney                                                                       B) - Appendix                                                                              Final Diagnosis   A. Kidney, LEFT, robotic-assisted laparoscopic radical nephrectomy:  - Clear cell renal cell carcinoma, ISUP grade 2, size 3.5 cm, lower pole   - Tumor is confined to renal parenchyma  - Sarcomatoid features not identified  - Necrosis not identified  - Lymphovascular invasion not identified  - Margins are uninvolved  by tumor  - See tumor synoptic below     B. Appendix, laparoscopic appendectomy:  - Appendiceal wall tissue with giant cell reaction, chronic inflammation, and extravasated mucin, consistent with treated appendicitis  - Negative for mucinous neoplasm malignancy   Electronically signed by Jolene Gomez MD on 10/11/2022 at 11:03 PM     Imaging   No new imaging to review today     ASSESSMENT AND PLAN  41 year old female with left renal mass status post left robotic radical nephrectomy on 9/30/2022 [id number]     This is indeed great news that there was a downgrade in pathologic staging compared to clinical stage which appeared cT3 disease. We discussed the NCCN guidelines for ccRCC after radical nephrectomy which includes periodic imaging and labs for the first 3 years and then as indicated.       Plan   Follow up in 6 months with Ct abdomen pelvis, cxr and BMP prior     40 total minutes spent on the date of the encounter including direct interaction with the patient, performing chart review, documentation and further activities as noted above    Luis Fernando Sigala MD   Department of Urology   [facility]

## 2022-10-12 NOTE — NURSING NOTE
"Chief Complaint   Patient presents with     Surgical Followup       Blood pressure 110/79, pulse 112, height 1.727 m (5' 8\"), weight 86.6 kg (191 lb), last menstrual period 09/30/2022, not currently breastfeeding. Body mass index is 29.04 kg/m .    Patient Active Problem List   Diagnosis     Other procreative management counseling and advice     Generalized anxiety disorder     Subclinical hypothyroidism     Vitamin D deficiency     Need for Tdap vaccination     AMA (advanced maternal age) primigravida 35+, first trimester     Labor and delivery, indication for care     Alcohol consumption binge drinking     History of PCOS     Irregular periods/menstrual cycles     Non-celiac gluten sensitivity     Perforated appendicitis     Tinea versicolor     Fatigue, unspecified type     Anemia, unspecified type     Hx of appendicitis     Left renal mass       No Known Allergies    Current Outpatient Medications   Medication Sig Dispense Refill     acetaminophen (TYLENOL) 325 MG tablet Take 3 tablets (975 mg) by mouth 4 times daily 180 tablet 0     IRON PO        ketoconazole (NIZORAL) 2 % external cream Apply topically daily 60 g 1     Omega-3 Fatty Acids (FISH OIL PO)  (Patient not taking: No sig reported)       sennosides (SENOKOT) 8.6 MG tablet Take 1 tablet by mouth daily for 14 days 14 tablet 0     valACYclovir (VALTREX) 1000 mg tablet Take 2 tablets (2,000 mg) by mouth 2 times daily (Patient taking differently: Take 2,000 mg by mouth as needed) 12 tablet 6       Social History     Tobacco Use     Smoking status: Former     Packs/day: 0.50     Years: 2.00     Pack years: 1.00     Types: Cigarettes     Quit date: 1/13/2019     Years since quitting: 3.7     Smokeless tobacco: Never   Substance Use Topics     Alcohol use: Yes     Comment: occasional     Drug use: No       Cordelia Hoffman, EMT  10/12/2022  3:48 PM  "

## 2022-10-13 NOTE — PROGRESS NOTES
"Urology Clinic     HPI   Caroline Pride is a very pleasant 41 year old female who initially presented to [facility] with a ruptured appendicitis and phlegmon almost 3 month ago which was managed with IV antibiotic with appropriate response. During the work up, she was found to have a 5 cm left renal mass and therefore she was referred to me for further evaluation.     After discussing the options, she opted to undergo left robotic radical nephrectomy combined with laparoscopic appendectomy which was done on 09/30/2022. She did well postoperatively and is here for follow up. She does not have any major complaints. One of the port closure sites appeared red and inflamed to her but otherwise pain is reasonably controlled. She reports normal appetite and regular bowel movement. She has been feeling tired since being diagnosed with ruptured appendicitis and has not regained her energy and stamina yet.     The patient denies any fever, nausea or vomiting.     PHYSICAL EXAM  /79   Pulse 112   Ht 1.727 m (5' 8\")   Wt 86.6 kg (191 lb)   LMP 09/30/2022 (Approximate)   BMI 29.04 kg/m     Constitutional: AO, pleasant, NAD  Resp: Non-labored breathing on room air  Abd: Soft, NT, ND, incisions are well healed   Ext WWP     Labs  Lab Results   Component Value Date    WBC 12.8 10/01/2022    WBC 11.4 05/13/2020     Lab Results   Component Value Date    RBC 3.77 10/01/2022    RBC 3.95 05/13/2020     Lab Results   Component Value Date    HGB 10.8 10/01/2022    HGB 10.9 05/15/2020     Lab Results   Component Value Date    HCT 33.3 10/01/2022    HCT 35.2 05/13/2020     No components found for: MCT  Lab Results   Component Value Date    MCV 88 10/01/2022    MCV 89 05/13/2020     Lab Results   Component Value Date    MCH 28.6 10/01/2022    MCH 30.4 05/13/2020     Lab Results   Component Value Date    MCHC 32.4 10/01/2022    MCHC 34.1 05/13/2020     Lab Results   Component Value Date    RDW 13.3 10/01/2022    " RDW 13.9 05/13/2020     Lab Results   Component Value Date     10/01/2022     05/13/2020        Last Comprehensive Metabolic Panel:  Sodium   Date Value Ref Range Status   10/01/2022 138 136 - 145 mmol/L Final   12/06/2017 141 133 - 144 mmol/L Final     Potassium   Date Value Ref Range Status   10/01/2022 4.0 3.4 - 5.3 mmol/L Final   08/17/2022 4.2 3.4 - 5.3 mmol/L Final   12/06/2017 4.2 3.4 - 5.3 mmol/L Final     Chloride   Date Value Ref Range Status   10/01/2022 106 98 - 107 mmol/L Final   08/17/2022 108 94 - 109 mmol/L Final   12/06/2017 108 94 - 109 mmol/L Final     Carbon Dioxide   Date Value Ref Range Status   12/06/2017 24 20 - 32 mmol/L Final     Carbon Dioxide (CO2)   Date Value Ref Range Status   10/01/2022 22 22 - 29 mmol/L Final   08/17/2022 21 20 - 32 mmol/L Final     Anion Gap   Date Value Ref Range Status   10/01/2022 10 7 - 15 mmol/L Final   08/17/2022 8 3 - 14 mmol/L Final   12/06/2017 9 3 - 14 mmol/L Final     Glucose   Date Value Ref Range Status   10/01/2022 111 (H) 70 - 99 mg/dL Final   08/17/2022 104 (H) 70 - 99 mg/dL Final   12/06/2017 112 (H) 70 - 99 mg/dL Final     GLUCOSE BY METER POCT   Date Value Ref Range Status   09/30/2022 120 (H) 70 - 99 mg/dL Final     Urea Nitrogen   Date Value Ref Range Status   10/01/2022 17.4 6.0 - 20.0 mg/dL Final   08/17/2022 16 7 - 30 mg/dL Final   12/06/2017 13 7 - 30 mg/dL Final     Creatinine   Date Value Ref Range Status   10/01/2022 1.06 (H) 0.51 - 0.95 mg/dL Final   05/13/2020 0.48 (L) 0.52 - 1.04 mg/dL Final     GFR Estimate   Date Value Ref Range Status   10/01/2022 67 >60 mL/min/1.73m2 Final     Comment:     Effective December 21, 2021 eGFRcr in adults is calculated using the 2021 CKD-EPI creatinine equation which includes age and gender (Robin et al., NEJM, DOI: 10.1056/AOKJkn1454795)   05/13/2020 >90 >60 mL/min/[1.73_m2] Final     Comment:     Non  GFR Calc  Starting 12/18/2018, serum creatinine based estimated GFR  (eGFR) will be   calculated using the Chronic Kidney Disease Epidemiology Collaboration   (CKD-EPI) equation.       Calcium   Date Value Ref Range Status   10/01/2022 8.5 (L) 8.6 - 10.0 mg/dL Final   12/06/2017 9.0 8.5 - 10.1 mg/dL Final     Bilirubin Total   Date Value Ref Range Status   08/17/2022 0.4 0.2 - 1.3 mg/dL Final   12/06/2017 0.4 0.2 - 1.3 mg/dL Final     Alkaline Phosphatase   Date Value Ref Range Status   08/17/2022 57 40 - 150 U/L Final   12/06/2017 52 40 - 150 U/L Final     ALT   Date Value Ref Range Status   08/17/2022 20 0 - 50 U/L Final   05/13/2020 18 0 - 50 U/L Final     AST   Date Value Ref Range Status   08/17/2022 4 0 - 45 U/L Final   05/13/2020 5 0 - 45 U/L Final     Surgical pathology     Case: [id number]                                   Authorizing Provider:  Luis Fernando Sigala MD          Collected:           09/30/2022 11:12 AM           Ordering Location:      MAIN OR                 Received:            09/30/2022 12:58 PM           Pathologist:           Jolene Gomez MD                                                    Specimens:   A) - Kidney, Left, Left Kidney                                                                       B) - Appendix                                                                              Final Diagnosis   A. Kidney, LEFT, robotic-assisted laparoscopic radical nephrectomy:  - Clear cell renal cell carcinoma, ISUP grade 2, size 3.5 cm, lower pole   - Tumor is confined to renal parenchyma  - Sarcomatoid features not identified  - Necrosis not identified  - Lymphovascular invasion not identified  - Margins are uninvolved by tumor  - See tumor synoptic below     B. Appendix, laparoscopic appendectomy:  - Appendiceal wall tissue with giant cell reaction, chronic inflammation, and extravasated mucin, consistent with treated appendicitis  - Negative for mucinous neoplasm malignancy   Electronically signed by Jolene Gomez MD on 10/11/2022  at 11:03 PM     Imaging   No new imaging to review today     ASSESSMENT AND PLAN  41 year old female with left renal mass status post left robotic radical nephrectomy on 9/30/2022 [id number]     This is indeed great news that there was a downgrade in pathologic staging compared to clinical stage which appeared cT3 disease. We discussed the NCCN guidelines for ccRCC after radical nephrectomy which includes periodic imaging and labs for the first 3 years and then as indicated.       Plan   Follow up in 6 months with Ct abdomen pelvis, cxr and BMP prior     40 total minutes spent on the date of the encounter including direct interaction with the patient, performing chart review, documentation and further activities as noted above    Luis Fernando Sigala MD   Department of Urology   [facility]

## 2022-11-14 ENCOUNTER — OFFICE VISIT (OUTPATIENT)
Dept: DERMATOLOGY | Facility: CLINIC | Age: 42
End: 2022-11-14
Payer: COMMERCIAL

## 2022-11-14 DIAGNOSIS — L82.1 SEBORRHEIC KERATOSIS: ICD-10-CM

## 2022-11-14 DIAGNOSIS — D49.2 NEOPLASM OF UNSPECIFIED BEHAVIOR OF BONE, SOFT TISSUE, AND SKIN: ICD-10-CM

## 2022-11-14 DIAGNOSIS — L81.4 SOLAR LENTIGO: Primary | ICD-10-CM

## 2022-11-14 DIAGNOSIS — D22.9 MULTIPLE BENIGN NEVI: ICD-10-CM

## 2022-11-14 PROCEDURE — 99203 OFFICE O/P NEW LOW 30 MIN: CPT | Mod: 25 | Performed by: PHYSICIAN ASSISTANT

## 2022-11-14 PROCEDURE — 88305 TISSUE EXAM BY PATHOLOGIST: CPT | Mod: 26 | Performed by: PATHOLOGY

## 2022-11-14 PROCEDURE — 11102 TANGNTL BX SKIN SINGLE LES: CPT | Performed by: PHYSICIAN ASSISTANT

## 2022-11-14 PROCEDURE — 88305 TISSUE EXAM BY PATHOLOGIST: CPT | Mod: TC | Performed by: PHYSICIAN ASSISTANT

## 2022-11-14 ASSESSMENT — PAIN SCALES - GENERAL: PAINLEVEL: NO PAIN (0)

## 2022-11-14 NOTE — NURSING NOTE
Lidocaine-epinephrine 1-1:757316 % injection   0.5mL once for one use, starting 11/14/2022 ending 11/14/2022,  2mL disp, R-0, injection  Injected by Kayleen Nolan CMA

## 2022-11-14 NOTE — NURSING NOTE
Dermatology Rooming Note    Caroline Pride's goals for this visit include:   Chief Complaint   Patient presents with     Skin Check     Has a few spots of concern on her shoulders.      Kayleen Nolan, CMA

## 2022-11-14 NOTE — PATIENT INSTRUCTIONS
Patient Education     Checking for Skin Cancer  You can find cancer early by checking your skin each month. There are 3 kinds of skin cancer. They are melanoma, basal cell carcinoma, and squamous cell carcinoma. Doing monthly skin checks is the best way to find new marks or skin changes. Follow the instructions below for checking your skin.   The ABCDEs of checking moles for melanoma   Check your moles or growths for signs of melanoma using ABCDE:   Asymmetry: the sides of the mole or growth don t match  Border: the edges are ragged, notched, or blurred  Color: the color within the mole or growth varies  Diameter: the mole or growth is larger than 6 mm (size of a pencil eraser)  Evolving: the size, shape, or color of the mole or growth is changing (evolving is not shown in the images below)    Checking for other types of skin cancer  Basal cell carcinoma or squamous cell carcinoma have symptoms such as:     A spot or mole that looks different from all other marks on your skin  Changes in how an area feels, such as itching, tenderness, or pain  Changes in the skin's surface, such as oozing, bleeding, or scaliness  A sore that does not heal  New swelling or redness beyond the border of a mole    Who s at risk?  Anyone can get skin cancer. But you are at greater risk if you have:   Fair skin, light-colored hair, or light-colored eyes  Many moles or abnormal moles on your skin  A history of sunburns from sunlight or tanning beds  A family history of skin cancer  A history of exposure to radiation or chemicals  A weakened immune system  If you have had skin cancer in the past, you are at risk for recurring skin cancer.   How to check your skin  Do your monthly skin checkups in front of a full-length mirror. Check all parts of your body, including your:   Head (ears, face, neck, and scalp)  Torso (front, back, and sides)  Arms (tops, undersides, upper, and lower armpits)  Hands (palms, backs, and fingers, including  under the nails)  Buttocks and genitals  Legs (front, back, and sides)  Feet (tops, soles, toes, including under the nails, and between toes)  If you have a lot of moles, take digital photos of them each month. Make sure to take photos both up close and from a distance. These can help you see if any moles change over time.   Most skin changes are not cancer. But if you see any changes in your skin, call your doctor right away. Only he or she can diagnose a problem. If you have skin cancer, seeing your doctor can be the first step toward getting the treatment that could save your life.   [employer] last reviewed this educational content on 4/1/2019 2000-2020 [employer]. [address] [place] [address], [place] [address]. All rights reserved. This information is not intended as a substitute for professional medical care. Always follow your healthcare professional's instructions.       When should I call my doctor?  If you are worsening or not improving, please, contact us or seek urgent care as noted below.     Who should I call with questions (adults)?  [facility] (adult and pediatric): [phone]  [facility] (adult): [phone]  For urgent needs outside of business hours call the [facility] at [phone] and ask for the dermatology resident on call to be paged  If this is a medical emergency and you are unable to reach an ER, Call [phone]    Who should I call with questions (pediatric)?  [facility]- Pediatric Dermatology  Dr. Yanni De La Paz, Dr. Saran Bernabe, Dr. Vianey Cerrato, GIA Olson, Dr. Areli Solorzano, Dr. Sandy Kaiser & Dr. Ethan Santiago  Non-urgent nurse triage line; [phone]- Juliana and Pepper CARVER Care Coordinatorabdelrahman Wharton (/Complex ) [phone]    If you need a prescription refill, please contact your pharmacy. Refills are approved or denied by our  Physicians during normal business hours, Monday through Fridays  Per office policy, refills will not be granted if you have not been seen within the past year (or sooner depending on your child's condition)    Scheduling Information:  Pediatric Appointment Scheduling and Call Center [phone]  Radiology Scheduling- [phone]  Sedation Unit Scheduling- [phone]  [place] Scheduling- General [phone]; Pediatric Dermatology [phone]  Main  Services: [phone]  Kinyarwanda: [phone]  Luxembourger: [phone]  Hmong/Mexican/Hungarian: [phone]  Preadmission Nursing Department Fax Number: [phone] (Fax all pre-operative paperwork to this number)    For urgent matters arising during evenings, weekends, or holidays that cannot wait for normal business hours please call [phone] and ask for the dermatology resident on call to be paged.

## 2022-11-14 NOTE — LETTER
11/14/2022       RE: Caroline Pride  [address]   [address]  [facility] [address]     Dear Colleague,    Thank you for referring your patient, Caroline Pride, to the [facility] DERMATOLOGY CLINIC [place] at [facility]. Please see a copy of my visit note below.    [facility] Dermatology Note  Encounter Date: Nov 14, 2022  Office Visit     Dermatology Problem List:  1. FBSE, 11/14/2022  # NUB, central back, s/p shave bx 11/14/2022  ____________________________________________    Assessment & Plan:    # NUB, central back, ddx: ISK vs superficial BCC  - Shave biopsy today, see procedure note below    # Multiple benign nevi  # Solar lentigines   - No concerning lesions today  - Monitor for ABCDEs of melanoma   - Continue sun protection - recommend SPF 30 or higher with frequent application   - Return sooner if noticing changing or symptomatic lesions    # Cherry angiomas  # Seborrheic keratoses  - Reassured patient of benign nature, no treatment necessary.     Procedures Performed:   - Shave biopsy procedure note, location(s): see above. After discussion of benefits and risks including but not limited to bleeding, infection, scar, incomplete removal, recurrence, and non-diagnostic biopsy, written consent and photographs were obtained. The area was cleaned with isopropyl alcohol. 0.5mL of 1% lidocaine with epinephrine was injected to obtain adequate anesthesia of lesion(s). Shave biopsy at site(s) performed. Hemostasis was achieved with aluminium chloride. Petrolatum ointment and a sterile dressing were applied. The patient tolerated the procedure and no complications were noted. The patient was provided with verbal and written post care instructions.     Follow-up: 1 year(s) in-person, or earlier for new or changing lesions    Staff and Scribe:     Scribe Disclosure:  BRENDAN GOODE, am serving as a scribe to document services  Patient still complaining of pain. ERP updated. No new orders.    personally performed by Katharine Hernandez PA-C based on data collection and the provider's statements to me.     Provider Disclosure:   The documentation recorded by the scribe accurately reflects the services I personally performed and the decisions made by me.    All risks, benefits and alternatives were discussed with patient.  Patient is in agreement and understands the assessment and plan.  All questions were answered.  Sun Screen Education was given.   Return to Clinic annually or sooner as needed.   Katharine Hernandez PA-C   [facility] Dermatology [facility]   ____________________________________________    CC: Skin Check (Has a few spots of concern on her shoulders. )    HPI:  Ms. Caroline Pride is a(n) 42 year old female who presents today as a new patient for a waist up skin exam.    Today, the patient reports dark spots on her shoulders and back that she would like examined. The spot on her back has been present for 2-3 years and showed up sometime around her pregnancy. Does not wear sunscreen regularly.     Patient is otherwise feeling well, without additional skin concerns.    Labs Reviewed:  N/A    Physical Exam:  Vitals: LMP 09/30/2022 (Approximate)   SKIN: Waist-up skin, which includes the head/face, neck, both arms, chest, back, abdomen, digits and/or nails was examined.  - Pink and brown 9 mm thin papule on the central back  - Multiple regular brown pigmented macules and papules are identified on the trunk and extremities.   - Scattered brown macules on sun exposed areas.  - There are waxy stuck on tan to brown papules on the trunk and extremities.   - No other lesions of concern on areas examined.             Medications:  Current Outpatient Medications   Medication     acetaminophen (TYLENOL) 325 MG tablet     IRON PO     ketoconazole (NIZORAL) 2 % external cream     valACYclovir (VALTREX) 1000 mg tablet     Omega-3 Fatty Acids (FISH OIL PO)     No current facility-administered  medications for this visit.      Past Medical History:   Patient Active Problem List   Diagnosis     Other procreative management counseling and advice     Generalized anxiety disorder     Subclinical hypothyroidism     Vitamin D deficiency     Need for Tdap vaccination     AMA (advanced maternal age) primigravida 35+, first trimester     Labor and delivery, indication for care     Alcohol consumption binge drinking     History of PCOS     Irregular periods/menstrual cycles     Non-celiac gluten sensitivity     Perforated appendicitis     Tinea versicolor     Fatigue, unspecified type     Anemia, unspecified type     Hx of appendicitis     Left renal mass     Past Medical History:   Diagnosis Date     Depressive disorder ongoing     PCOS (polycystic ovarian syndrome)      Subclinical hypothyroidism         CC Giovanna Mistry, APRN CNP  [address]  [place] [address] on close of this encounter.

## 2022-11-14 NOTE — PROGRESS NOTES
[facility] Dermatology Note  Encounter Date: Nov 14, 2022  Office Visit     Dermatology Problem List:  1. FBSE, 11/14/2022  # NUB, central back, s/p shave bx 11/14/2022  ____________________________________________    Assessment & Plan:    # NUB, central back, ddx: ISK vs superficial BCC  - Shave biopsy today, see procedure note below    # Multiple benign nevi  # Solar lentigines   - No concerning lesions today  - Monitor for ABCDEs of melanoma   - Continue sun protection - recommend SPF 30 or higher with frequent application   - Return sooner if noticing changing or symptomatic lesions    # Cherry angiomas  # Seborrheic keratoses  - Reassured patient of benign nature, no treatment necessary.     Procedures Performed:   - Shave biopsy procedure note, location(s): see above. After discussion of benefits and risks including but not limited to bleeding, infection, scar, incomplete removal, recurrence, and non-diagnostic biopsy, written consent and photographs were obtained. The area was cleaned with isopropyl alcohol. 0.5mL of 1% lidocaine with epinephrine was injected to obtain adequate anesthesia of lesion(s). Shave biopsy at site(s) performed. Hemostasis was achieved with aluminium chloride. Petrolatum ointment and a sterile dressing were applied. The patient tolerated the procedure and no complications were noted. The patient was provided with verbal and written post care instructions.     Follow-up: 1 year(s) in-person, or earlier for new or changing lesions    Staff and Scribe:     Scribe Disclosure:  I, BRENDAN PORTER, am serving as a scribe to document services personally performed by Katharine Hernandez PA-C based on data collection and the provider's statements to me.     Provider Disclosure:   The documentation recorded by the scribe accurately reflects the services I personally performed and the decisions made by me.    All risks, benefits and alternatives were discussed with  patient.  Patient is in agreement and understands the assessment and plan.  All questions were answered.  Sun Screen Education was given.   Return to Clinic annually or sooner as needed.   Katharine Hernandez PA-C   [facility]   ____________________________________________    CC: Skin Check (Has a few spots of concern on her shoulders. )    HPI:  Ms. Caroline Pride is a(n) 42 year old female who presents today as a new patient for a waist up skin exam.    Today, the patient reports dark spots on her shoulders and back that she would like examined. The spot on her back has been present for 2-3 years and showed up sometime around her pregnancy. Does not wear sunscreen regularly.     Patient is otherwise feeling well, without additional skin concerns.    Labs Reviewed:  N/A    Physical Exam:  Vitals: LMP 09/30/2022 (Approximate)   SKIN: Waist-up skin, which includes the head/face, neck, both arms, chest, back, abdomen, digits and/or nails was examined.  - Pink and brown 9 mm thin papule on the central back  - Multiple regular brown pigmented macules and papules are identified on the trunk and extremities.   - Scattered brown macules on sun exposed areas.  - There are waxy stuck on tan to brown papules on the trunk and extremities.   - No other lesions of concern on areas examined.             Medications:  Current Outpatient Medications   Medication     acetaminophen (TYLENOL) 325 MG tablet     IRON PO     ketoconazole (NIZORAL) 2 % external cream     valACYclovir (VALTREX) 1000 mg tablet     Omega-3 Fatty Acids (FISH OIL PO)     No current facility-administered medications for this visit.      Past Medical History:   Patient Active Problem List   Diagnosis     Other procreative management counseling and advice     Generalized anxiety disorder     Subclinical hypothyroidism     Vitamin D deficiency     Need for Tdap vaccination     AMA (advanced maternal age) primigravida 35+, first  trimester     Labor and delivery, indication for care     Alcohol consumption binge drinking     History of PCOS     Irregular periods/menstrual cycles     Non-celiac gluten sensitivity     Perforated appendicitis     Tinea versicolor     Fatigue, unspecified type     Anemia, unspecified type     Hx of appendicitis     Left renal mass     Past Medical History:   Diagnosis Date     Depressive disorder ongoing     PCOS (polycystic ovarian syndrome)      Subclinical hypothyroidism         CC Giovanna Mistry, APRN CNP  [address]  [place] [address] on close of this encounter.

## 2022-11-15 LAB
PATH REPORT.COMMENTS IMP SPEC: NORMAL
PATH REPORT.COMMENTS IMP SPEC: NORMAL
PATH REPORT.FINAL DX SPEC: NORMAL
PATH REPORT.GROSS SPEC: NORMAL
PATH REPORT.MICROSCOPIC SPEC OTHER STN: NORMAL
PATH REPORT.RELEVANT HX SPEC: NORMAL

## 2022-11-16 ENCOUNTER — TELEPHONE (OUTPATIENT)
Dept: DERMATOLOGY | Facility: CLINIC | Age: 42
End: 2022-11-16

## 2022-11-16 DIAGNOSIS — C44.91 BCC (BASAL CELL CARCINOMA): Primary | ICD-10-CM

## 2022-11-16 NOTE — TELEPHONE ENCOUNTER
Please refer to the dermatology surgery department for an excision of the BCC on the central back.   Thanks!   Best, Katharine Hernandez PA-C

## 2022-11-17 ENCOUNTER — TELEPHONE (OUTPATIENT)
Dept: DERMATOLOGY | Facility: CLINIC | Age: 42
End: 2022-11-17

## 2022-11-17 NOTE — TELEPHONE ENCOUNTER
Left patient a voicemail to schedule a consult & mohs for  BCC central back with Dr. Adrian. Provided my direct phone number.    Maria De Jesus Parker on 11/17/2022 at 11:04 AM

## 2022-11-22 NOTE — TELEPHONE ENCOUNTER
Left patient a voicemail to schedule a consult & mohs for  BCC central back with Dr. Adrian. Provided my direct phone number.    Maria De Jesus Parker, Procedure  11/22/2022 10:09 AM     [facility]

## 2022-11-22 NOTE — TELEPHONE ENCOUNTER
Pt called back and left vm. I called again to schedule a consult & mohs for  BCC central back with Dr. Adrian. Provided my direct phone number.     Maria De Jesus Parker, Procedure  11/22/2022 4:20 PM

## 2022-12-01 NOTE — TELEPHONE ENCOUNTER
Called and spoke with pt.     Called patient to schedule surgery with Dr. Adrian    Date of Surgery: 01/31    Surgery type: excision    Consult scheduled: Yes    Has patient had mohs with us before? Not Applicable    Additional comments: jennifer Parker on 12/1/2022 at 8:40 AM

## 2022-12-02 NOTE — TELEPHONE ENCOUNTER
FUTURE VISIT INFORMATION      FUTURE VISIT INFORMATION:    Date: 1.9.23    Time: 3:15    Location: Telephone  REFERRAL INFORMATION:    Referring provider:  David    Referring providers clinic:  Derm    Reason for visit/diagnosis  Excision on central back for BCC    RECORDS REQUESTED FROM:       Clinic name Comments Records Status Imaging Status   Derm 11.14.22  Path # [id number] Epic Epic

## 2023-01-09 ENCOUNTER — PRE VISIT (OUTPATIENT)
Dept: DERMATOLOGY | Facility: CLINIC | Age: 43
End: 2023-01-09

## 2023-01-09 ENCOUNTER — VIRTUAL VISIT (OUTPATIENT)
Dept: DERMATOLOGY | Facility: CLINIC | Age: 43
End: 2023-01-09
Payer: COMMERCIAL

## 2023-01-09 DIAGNOSIS — C44.519 BASAL CELL CARCINOMA (BCC) OF BACK: Primary | ICD-10-CM

## 2023-01-09 PROCEDURE — 99213 OFFICE O/P EST LOW 20 MIN: CPT | Mod: GC | Performed by: DERMATOLOGY

## 2023-01-09 ASSESSMENT — PAIN SCALES - GENERAL: PAINLEVEL: NO PAIN (0)

## 2023-01-09 NOTE — PROGRESS NOTES
Dermatologic Surgery Consult Note    Jan 9, 2023  Start time (if telephone encounter): 3:15 p.m.  End time (if telephone encounter): 3:25 p.m.    Dermatology Problem List:  1. History of NMSC  - BCC, central back, s/p bx 11/14/22    Last FBSE: 11/14/22    Subjective: The patient is a 42 year old woman who presents today for dermatologic surgery consultation for a recent diagnosis of skin cancer.    Skin cancer(s): BCC  Location(s): central back  Associated symptoms: pruritus, tenderness to touch  Onset: within last 1 year    No other associated symptoms, modifying factors, or prior treatments, except when noted above. The patient denies any constitutional symptoms, lymphadenopathy, unintentional weight loss or decreased appetite. No other skin concerns today.    Objective:   Skin: Focused examination of the back within the teledermatology photograph(s) on 11/14/22 was performed.   - 1 cm pink scaly papule on central back    Assessment and Plan:     1. Plan for wide local excision for skin cancer(s) above:  - We discussed the nature of the diagnosis/condition above. We discussed the treatment options, including the risks benefits and expectations of these options. We recommend excision as the most effective and most tissue sparing option for treatment, and the patient agrees to proceed with this.  The patient is aware of the risks, benefits and expectations of this procedure. The patient will be scheduled for this procedure, if not already done so.  - We anticipate the following closure type: Linear closure, such as complex linear closure (CLC)    The patient was discussed with and evaluated by attending physician, Trenton Adrian MD.    Jim Small MD  Dermatology, PGY-5  Mohs surgery fellow    Attending Attestation  I attest that I discussed the case with the Fellow.  I agree with the plan.   I have reviewed the note and edited it as necessary.    Trenton Adrian M.D.  Professor  Director of Dermatologic  Surgery  Department of Dermatology  [facility]

## 2023-01-09 NOTE — LETTER
1/9/2023       RE: Caroline Pride  [address]   [address]  [facility] [address]     Dear Colleague,    Thank you for referring your patient, Caroline Pride, to the [facility] DERMATOLOGIC SURGERY CLINIC [place] at . Please see a copy of my visit note below.    Dermatologic Surgery Consult Note    Jan 9, 2023  Start time (if telephone encounter): 3:15 p.m.  End time (if telephone encounter): 3:25 p.m.    Dermatology Problem List:  1. History of NMSC  - BCC, central back, s/p bx 11/14/22    Last FBSE: 11/14/22    Subjective: The patient is a 42 year old woman who presents today for dermatologic surgery consultation for a recent diagnosis of skin cancer.    Skin cancer(s): BCC  Location(s): central back  Associated symptoms: pruritus, tenderness to touch  Onset: within last 1 year    No other associated symptoms, modifying factors, or prior treatments, except when noted above. The patient denies any constitutional symptoms, lymphadenopathy, unintentional weight loss or decreased appetite. No other skin concerns today.    Objective:   Skin: Focused examination of the back within the teledermatology photograph(s) on 11/14/22 was performed.   - 1 cm pink scaly papule on central back    Assessment and Plan:     1. Plan for wide local excision for skin cancer(s) above:  - We discussed the nature of the diagnosis/condition above. We discussed the treatment options, including the risks benefits and expectations of these options. We recommend excision as the most effective and most tissue sparing option for treatment, and the patient agrees to proceed with this.  The patient is aware of the risks, benefits and expectations of this procedure. The patient will be scheduled for this procedure, if not already done so.  - We anticipate the following closure type: Linear closure, such as complex linear closure (CLC)    The patient was discussed with and  evaluated by attending physician, Trenton Adrian MD.    Jim Small MD  Dermatology, PGY-5  Mohs surgery fellow    Attending Attestation  I attest that I discussed the case with the Fellow.  I agree with the plan.   I have reviewed the note and edited it as necessary.    Trenton Adrian M.D.  Professor  Director of Dermatologic Surgery  Department of Dermatology  [facility]

## 2023-01-09 NOTE — NURSING NOTE
Chief Complaint   Patient presents with     Derm Problem     Consult for excision.      Lila SALGADO CMA

## 2023-01-31 ENCOUNTER — OFFICE VISIT (OUTPATIENT)
Dept: DERMATOLOGY | Facility: CLINIC | Age: 43
End: 2023-01-31
Payer: COMMERCIAL

## 2023-01-31 VITALS — DIASTOLIC BLOOD PRESSURE: 89 MMHG | SYSTOLIC BLOOD PRESSURE: 126 MMHG | HEART RATE: 87 BPM

## 2023-01-31 DIAGNOSIS — C44.519 BASAL CELL CARCINOMA (BCC) OF BACK: Primary | ICD-10-CM

## 2023-01-31 DIAGNOSIS — D49.2 NEOPLASM OF UNSPECIFIED BEHAVIOR OF BONE, SOFT TISSUE, AND SKIN: ICD-10-CM

## 2023-01-31 PROCEDURE — 12032 INTMD RPR S/A/T/EXT 2.6-7.5: CPT | Mod: XS | Performed by: DERMATOLOGY

## 2023-01-31 PROCEDURE — 11102 TANGNTL BX SKIN SINGLE LES: CPT | Mod: XS | Performed by: DERMATOLOGY

## 2023-01-31 PROCEDURE — 88305 TISSUE EXAM BY PATHOLOGIST: CPT | Mod: TC | Performed by: DERMATOLOGY

## 2023-01-31 PROCEDURE — 88305 TISSUE EXAM BY PATHOLOGIST: CPT | Mod: 26 | Performed by: DERMATOLOGY

## 2023-01-31 PROCEDURE — 11602 EXC TR-EXT MAL+MARG 1.1-2 CM: CPT | Mod: GC | Performed by: DERMATOLOGY

## 2023-01-31 ASSESSMENT — PAIN SCALES - GENERAL: PAINLEVEL: NO PAIN (0)

## 2023-01-31 NOTE — PATIENT INSTRUCTIONS

## 2023-01-31 NOTE — LETTER
1/31/2023       RE: Caroline Pride  [address]   [address]  [facility] [address]     Dear Colleague,    Thank you for referring your patient, Caroline Pride, to the [facility] DERMATOLOGIC SURGERY CLINIC [place] [facility]. Please see a copy of my visit note below.    [facility] Dermatologic Surgery Excision Note    Case #: 1  Date of Service:  Jan 31, 2023  Surgery: Wide local excision  Staff Surgeon: Trenton Adrian MD  Fellow Surgeon: Jim Small MD  Resident Surgeon: None  Nurse: PATRICK Corrigan    Tumor Type: BCC  Location: central back  Derm-Path Accession #: [id number]     Skin Lesion Size:  1.2 cm x 0.8 cm  Margin: 4 mm  Excision size: 2.0 cm x 1.6 cm  Level of Defect: Fat  Repair Type: Intermediate linear  Repair Size: 4.9 cm  Suture Material: 4-0 Monocryl    INDICATIONS:  The patient was scheduled for wide local excision of the skin lesion documented above. We discussed the principles of treatment and most likely complications including scarring, bleeding, infection, incomplete excision, wound dehiscence, pain, nerve damage, and recurrence. Informed consent was obtained and the patient underwent the procedure as follows:    PROCEDURE:  With the patient in a prone position, the lesion was outlined with the margin documented above.  The lesion and the necessary margin (excised diameter) was measured and documented as above.  An ellipse was designed around the lesion to conform to relaxed skin tension lines in an effort to minimize scarring and deformity.  The lesion and surrounding skin were prepped with chlorhexidine, draped, and anesthetized with lidocaine with epinephrine. Using a #15 blade, the skin was excised along premarked lines.  The level of the defect is documented as above.  Wound margins were undermined to limit functional deformity/impairment of adjacent structures. Bleeding vessels were controlled with  electrocoagulation.  The dermis and subcutaneous tissue were closed with buried vertical mattress sutures using 4-0 Monocryl.  Epidermal approximation was meticulously refined with 4-0 Monocryl subcuticular sutures, resulting in a linear closure with little to no wound tension.  Blood loss was estimated to be less than 5 mL.  The area was coated with petrolatum and covered with a non-adherent dressing followed by gauze and tape. Postoperative instructions were reviewed per protocol.  The patient left alert and fully oriented.    Follow-up for suture removal: Not applicable as only dissolving sutures used     ADDITIONAL E/M AT TIME OF PROCEDURE    S: Patient has spots on her back that she would like examined. No particular spots of concern.    O: A focused exam of the relevant cutaneous sites revealed the following abnormal findings:  - 0.6 cm pearly pink papule at junction of posterior neck and upper back    A/P:    1. NUB of skin. Posterior neck  - R/o NMSC  - Tangential biopsy performed today. See procedure note    Procedures:    Biopsy Procedure:  - Shave biopsy procedure note, location(s): posterior neck. After discussion of benefits and risks including but not limited to bleeding, infection, scar, incomplete removal, recurrence, and non-diagnostic biopsy, written consent and photographs were obtained. The area was cleaned with isopropyl alcohol. 0.5mL of 1% lidocaine with epinephrine was injected to obtain adequate anesthesia of lesion(s). Shave biopsy at site(s) performed. Hemostasis was achieved with aluminium chloride. Petrolatum ointment and a sterile dressing were applied. The patient tolerated the procedure and no complications were noted. The patient was provided with verbal and written post care instructions.     Trenton Adrian MD was immediately available for the entire surgery and was physicially present for the key portions of the procedure.    Jim Small MD  Dermatology, PGY-5  Mohs surgery  fellow    Scribe Disclosure:  I, Cachorro Monson, am serving as a scribe to document services personally performed by Trenton Adrian MD based on data collection and the provider's statements to me.       Attending attestation:  I was present for key elements of the procedure and immediately available for all other portions of the procedure.  I have reviewed the note and edited it as necessary.    Trenton Adrian M.D.  Professor  Director of Dermatologic Surgery  Department of Dermatology  [facility]    Dermatology Surgery Clinic  [facility] [place] [address]

## 2023-01-31 NOTE — NURSING NOTE
Chief Complaint   Patient presents with     Derm Problem     BCC central back     Amy RENTERIA, EMT  Dermatology/Dermatology Surgery  [phone]

## 2023-01-31 NOTE — PROGRESS NOTES
[facility] Dermatologic Surgery Excision Note    Case #: 1  Date of Service:  Jan 31, 2023  Surgery: Wide local excision  Staff Surgeon: Trenton Adrian MD  Fellow Surgeon: Jim Small MD  Resident Surgeon: None  Nurse: Amy Szymanski, PATRICK    Tumor Type: BCC  Location: central back  Derm-Path Accession #: [id number]     Skin Lesion Size:  1.2 cm x 0.8 cm  Margin: 4 mm  Excision size: 2.0 cm x 1.6 cm  Level of Defect: Fat  Repair Type: Intermediate linear  Repair Size: 4.9 cm  Suture Material: 4-0 Monocryl    INDICATIONS:  The patient was scheduled for wide local excision of the skin lesion documented above. We discussed the principles of treatment and most likely complications including scarring, bleeding, infection, incomplete excision, wound dehiscence, pain, nerve damage, and recurrence. Informed consent was obtained and the patient underwent the procedure as follows:    PROCEDURE:  With the patient in a prone position, the lesion was outlined with the margin documented above.  The lesion and the necessary margin (excised diameter) was measured and documented as above.  An ellipse was designed around the lesion to conform to relaxed skin tension lines in an effort to minimize scarring and deformity.  The lesion and surrounding skin were prepped with chlorhexidine, draped, and anesthetized with lidocaine with epinephrine. Using a #15 blade, the skin was excised along premarked lines.  The level of the defect is documented as above.  Wound margins were undermined to limit functional deformity/impairment of adjacent structures. Bleeding vessels were controlled with electrocoagulation.  The dermis and subcutaneous tissue were closed with buried vertical mattress sutures using 4-0 Monocryl.  Epidermal approximation was meticulously refined with 4-0 Monocryl subcuticular sutures, resulting in a linear closure with little to no wound tension.  Blood loss was estimated to be less than 5 mL.  The area was  coated with petrolatum and covered with a non-adherent dressing followed by gauze and tape. Postoperative instructions were reviewed per protocol.  The patient left alert and fully oriented.    Follow-up for suture removal: Not applicable as only dissolving sutures used     ADDITIONAL E/M AT TIME OF PROCEDURE    S: Patient has spots on her back that she would like examined. No particular spots of concern.    O: A focused exam of the relevant cutaneous sites revealed the following abnormal findings:  - 0.6 cm pearly pink papule at junction of posterior neck and upper back    A/P:    1. NUB of skin. Posterior neck  - R/o NMSC  - Tangential biopsy performed today. See procedure note    Procedures:    Biopsy Procedure:  - Shave biopsy procedure note, location(s): posterior neck. After discussion of benefits and risks including but not limited to bleeding, infection, scar, incomplete removal, recurrence, and non-diagnostic biopsy, written consent and photographs were obtained. The area was cleaned with isopropyl alcohol. 0.5mL of 1% lidocaine with epinephrine was injected to obtain adequate anesthesia of lesion(s). Shave biopsy at site(s) performed. Hemostasis was achieved with aluminium chloride. Petrolatum ointment and a sterile dressing were applied. The patient tolerated the procedure and no complications were noted. The patient was provided with verbal and written post care instructions.     Trenton Adrian MD was immediately available for the entire surgery and was physicially present for the key portions of the procedure.    Jim Small MD  Dermatology, PGY-5  Mohs surgery fellow    Scribe Disclosure:  Cachorro GOODE, am serving as a scribe to document services personally performed by Trenton Adrian MD based on data collection and the provider's statements to me.       Attending attestation:  I was present for key elements of the procedure and immediately available for all other portions of the procedure.  I  have reviewed the note and edited it as necessary.    Trenton Adrian M.D.  Professor  Director of Dermatologic Surgery  Department of Dermatology  [facility]    Dermatology Surgery Clinic  [facility] [place] [id number]

## 2023-02-03 ENCOUNTER — MYC MEDICAL ADVICE (OUTPATIENT)
Dept: DERMATOLOGY | Facility: CLINIC | Age: 43
End: 2023-02-03
Payer: COMMERCIAL

## 2023-02-03 DIAGNOSIS — C44.41 BASAL CELL CARCINOMA (BCC) OF NECK: Primary | ICD-10-CM

## 2023-02-03 NOTE — TELEPHONE ENCOUNTER
Assessed patient's wound. Looks to be as expected at this point. Some drainage is normal at this point, bandage still in tact. Advised patient it is ok to shower.    Gemini VÁZQUEZ RN

## 2023-02-06 ENCOUNTER — TELEPHONE (OUTPATIENT)
Dept: DERMATOLOGY | Facility: CLINIC | Age: 43
End: 2023-02-06
Payer: COMMERCIAL

## 2023-02-06 NOTE — TELEPHONE ENCOUNTER
Called patient to schedule surgery with Dr. Adrian    Date of Surgery: 03/13    Surgery type: mohs    Consult scheduled: Yes    Has patient had mohs with us before? No    Additional comments: jennifer Parker on 2/6/2023 at 10:10 AM

## 2023-03-06 ENCOUNTER — VIRTUAL VISIT (OUTPATIENT)
Dept: DERMATOLOGY | Facility: CLINIC | Age: 43
End: 2023-03-06
Payer: COMMERCIAL

## 2023-03-06 DIAGNOSIS — C44.41 BASAL CELL CARCINOMA (BCC) OF NECK: Primary | ICD-10-CM

## 2023-03-06 PROCEDURE — 99213 OFFICE O/P EST LOW 20 MIN: CPT | Mod: TEL | Performed by: DERMATOLOGY

## 2023-03-06 NOTE — NURSING NOTE
Chief Complaint   Patient presents with     Derm Problem     Patient is here today for      Gemini VÁZQUEZ RN

## 2023-03-06 NOTE — PROGRESS NOTES
Mohs Micrographic Surgery Consult Note    Mar 6, 2023  Start time (if telephone encounter): 2:45 p.m.  End time (if telephone encounter): 2:55 p.m.    Dermatology Problem List:  1. History of NMSC  - BCC, central back, s/p WLE 1/31/2023  - BCC, posterior neck, s/p shave bx 1/31/2023    Subjective: The patient is a 42 year old woman who presents today for Mohs micrographic surgery consultation for a recent diagnosis of skin cancer.    Skin cancer(s): BCC  Location(s): posterior neck  Associated symptoms: pruritus, tenderness to touch  Onset: within last 1 year    No other associated symptoms, modifying factors, or prior treatments, except when noted above. The patient denies any constitutional symptoms, lymphadenopathy, unintentional weight loss or decreased appetite. No other skin concerns today.    Objective:   Skin: Focused examination of the posterior neck within the teledermatology photograph(s) on 3/6/23 was performed.   - Depressed white biopsy scar on posterior neck    Assessment and Plan:     1. Plan for Mohs micrographic surgery for skin cancer(s) above:  - We discussed the nature of the diagnosis/condition above. We discussed the treatment options, including the risks benefits and expectations of these options. We recommend micrographic surgery as the most effective and most tissue sparing option for treatment, and the patient agrees to proceed with this.  The patient is aware of the risks, benefits and expectations of this procedure. The patient will be scheduled for this procedure, if not already done so.  - We anticipate the following closure type: Linear closure, such as complex linear closure (CLC)    The patient was discussed with and evaluated by attending physician, Trenton Adrian MD.    Scribe Disclosure:  RUSSEL, Cachorro Monson, am serving as a scribe to document services personally performed by Trenton Adrian MD based on data collection and the provider's statements to me.     Resident:  I discussed the  patient with the attending physician, Dr. Trenton Adrian.     Luis Cornell MD, PhD  PGY-2 Dermatology Resident     Attending Attestation  I attest that I discussed the case with the Fellow.  I agree with the plan.   I was present for the entire telephone encounter.  I have reviewed the note and edited it as necessary.    Trenton Adrian M.D.  Professor  Director of Dermatologic Surgery  Department of Dermatology  [facility]

## 2023-03-06 NOTE — LETTER
3/6/2023       RE: Caroline Pride  [address]   [address]  [facility] [address]     Dear Colleague,    Thank you for referring your patient, Caroline Pride, to the [facility] DERMATOLOGIC SUR[facility]ERY [facility]IC [place] at [facility]. Please see a copy of my visit note below.    Mohs Micrographic Surgery Consult Note    Mar 6, 2023  Start time (if telephone encounter): 2:45 p.m.  End time (if telephone encounter): 2:55 p.m.    Dermatology Problem List:  1. History of NMSC  - BCC, central back, s/p WLE 1/31/2023  - BCC, posterior neck, s/p shave bx 1/31/2023    Subjective: The patient is a 42 year old woman who presents today for Mohs micrographic surgery consultation for a recent diagnosis of skin cancer.    Skin cancer(s): BCC  Location(s): posterior neck  Associated symptoms: pruritus, tenderness to touch  Onset: within last 1 year    No other associated symptoms, modifying factors, or prior treatments, except when noted above. The patient denies any constitutional symptoms, lymphadenopathy, unintentional weight loss or decreased appetite. No other skin concerns today.    Objective:   Skin: Focused examination of the posterior neck within the teledermatology photograph(s) on 3/6/23 was performed.   - Depressed white biopsy scar on posterior neck    Assessment and Plan:     1. Plan for Mohs micrographic surgery for skin cancer(s) above:  - We discussed the nature of the diagnosis/condition above. We discussed the treatment options, including the risks benefits and expectations of these options. We recommend micrographic surgery as the most effective and most tissue sparing option for treatment, and the patient agrees to proceed with this.  The patient is aware of the risks, benefits and expectations of this procedure. The patient will be scheduled for this procedure, if not already done so.  - We anticipate the following closure type: Linear closure, such  as complex linear closure (CLC)    The patient was discussed with and evaluated by attending physician, Trenton Adrian MD.    Scribe Disclosure:  I, Cachorro Monson, am serving as a scribe to document services personally performed by Trenton Adrian MD based on data collection and the provider's statements to me.     Resident:  I discussed the patient with the attending physician, Dr. Trenton Adrian.     Luis Cornell MD, PhD  PGY-2 Dermatology Resident     Attending Attestation  I attest that I discussed the case with the Fellow.  I agree with the plan.   I was present for the entire telephone encounter.  I have reviewed the note and edited it as necessary.    Trenton Adrian M.D.  Professor  Director of Dermatologic Surgery  Department of Dermatology  [facility]

## 2023-03-13 ENCOUNTER — OFFICE VISIT (OUTPATIENT)
Dept: DERMATOLOGY | Facility: CLINIC | Age: 43
End: 2023-03-13
Payer: COMMERCIAL

## 2023-03-13 VITALS — DIASTOLIC BLOOD PRESSURE: 81 MMHG | HEART RATE: 76 BPM | SYSTOLIC BLOOD PRESSURE: 123 MMHG

## 2023-03-13 DIAGNOSIS — C44.41 BASAL CELL CARCINOMA (BCC) OF NECK: Primary | ICD-10-CM

## 2023-03-13 PROCEDURE — 17311 MOHS 1 STAGE H/N/HF/G: CPT | Mod: GC | Performed by: DERMATOLOGY

## 2023-03-13 PROCEDURE — 13132 CMPLX RPR F/C/C/M/N/AX/G/H/F: CPT | Mod: GC | Performed by: DERMATOLOGY

## 2023-03-13 ASSESSMENT — PAIN SCALES - GENERAL: PAINLEVEL: NO PAIN (0)

## 2023-03-13 NOTE — NURSING NOTE
Chief Complaint   Patient presents with     Derm Problem     Patient is here today for Mohs regarding posterior neck BCC.      Gemini VÁZQUEZ RN

## 2023-03-13 NOTE — PATIENT INSTRUCTIONS

## 2023-03-13 NOTE — PROGRESS NOTES
[facility] Mohs Surgery Procedure Note    Case #: 1  Date of Service:  Mar 13, 2023  Surgery: Mohs micrographic surgery (MMS)  Staff surgeon: Trenton Adrian MD  Fellow surgeon: Jim Small MD  Resident surgeon: Luis Cornell M.D., PhD  Nurse: Lila Parisi CMA    Tumor Type: BCC  Location: Posterior neck  Derm-Path Accession #: [id number]    Mohs Accession #:   Pre-Op Size: 1.0 cm x 0.5 cm  Final Defect Size: 1.6 cm x 1.8 cm  Number of Mohs stages: 1  Level of Defect: Fat  Local anesthetic: 9 mL 1% lidocaine with epinephrine  Repair Type: Complex linear  Repair Size: 4.0 cm  Suture Material: 4-0 Monocryl; 5-0 fast absorbing gut    Procedure:    Stage I  We discussed the principles of treatment and most likely complications including scarring, bleeding, infection, swelling, pain, crusting, nerve damage, large wound,  incomplete excision, wound dehiscence,  nerve damage, recurrence, and a second procedure may be recommended to obtain the best cosmetic or functional result.    Informed consent was obtained and the patient underwent the procedure as follows:  The patient was placed supine on the operating table.  The cancer was identified, outlined with a marker, and verified by the patient.  The entire surgical field was prepped with chlorhexidine.  The surgical site was anesthetized using lidocaine with epinephrine.    The area of clinically apparent tumor was debulked. The layer of tissue was then surgically excised using a #15 blade and was then transferred onto a specimen sheet maintaining the orientation of the specimen. Hemostasis was obtained using electrocoagulation. The wound site was then covered with a dressing while the tissue samples were processed for examination.    The excised tissue was transported to the Mohs histology laboratory maintaining the tissue orientation.  The tissue specimen was relaxed so that the entire surgical margin was in a a single horizontal plane for  sectioning and inked for precise mapping.  A precise reference map was drawn to reflect the sectioning of the specimen, colored inking of the margins, and orientation on the patient.  The tissue was processed using horizontal sectioning of the base and continuous peripheral margins.  The histopathologic sections were reviewed in conjunction with the reference map.    Total blocks: 1  Total slides: 2    There were no cancer cells visualized on examination, therefore Mohs surgery was complete.    REPAIR:     Due to one or more of the following factors, complex linear closure was required/indicated: Extensive undermining (equal to or greater than the maximum perpendicular width of the defect), exposure of underlying structure (bone, cartilage, tendon, named neurovascular), free margin involvement (helical rim, vermillion border, nostril rim), and/or placement of retention sutures.    After the excision of the tumor, the area was extensively and carefully undermined using tissue scissors. Hemostasis was obtained with spot electrocautery and ligation of vessels where necessary. Initial deep plication sutures of 4-0 Monocryl sutures were placed in the deep, subcutaneous, and fascial planes to appose the lateral margins. The subcutaneous and dermal layers were then closed with 4-0 Monocryl sutures. The epidermis was then carefully approximated along the length of the wound using 5-0 fast absorbing gut running subcuticular sutures.     Estimated blood loss was less than 10 ml for all surgical sites. A sterile pressure dressing was applied and wound care instructions, with a written handout, were given. The patient was discharged from the [facility]ermatologic Surgery Center alert and ambulatory.    Dr. Jim Small (Mohs micrographic surgery fellow) performed the Mohs micrographic surgery and reconstruction under the direct supervision of Trenton Adrian MD, who was present for the entire micrographic surgery and key portions of the  reconstruction, and always immediately available.    Jim Small MD  Dermatology, PGY-5  Mohs surgery fellow    Scribe Disclosure:  I, Neal Nuñez, am serving as a scribe to document services personally performed by Trenton Adrian MD based on data collection and the provider's statements to me.       Attending attestation:  I was present for key elements of the procedure and immediately available for all other portions of the procedure.  I have reviewed the note and edited it as necessary.    Trenton Adrian M.D.  Professor  Director of Dermatologic Surgery  Department of Dermatology  [facility]    Dermatology Surgery Clinic  [facility]  [address] [place], [place] [id number]

## 2023-03-13 NOTE — LETTER
3/13/2023       RE: Caroline Pride  [address]   [address]  [place] [address]     Dear Colleague,    Thank you for referring your patient, Caroline Pride, to the [facility] DERMATOLOGIC SURGERY CLINIC [place] at [facility]. Please see a copy of my visit note below.    [facility] Mohs Surgery Procedure Note    Case #: 1  Date of Service:  Mar 13, 2023  Surgery: Mohs micrographic surgery (MMS)  Staff surgeon: Trenton Adrian MD  Fellow surgeon: Jim Small MD  Resident surgeon: Luis Cornell M.D., PhD  Nurse: Lila Parisi CMA    Tumor Type: BCC  Location: Posterior neck  Derm-Path Accession #: [id number]    Mohs Accession #:   Pre-Op Size: 1.0 cm x 0.5 cm  Final Defect Size: 1.6 cm x 1.8 cm  Number of Mohs stages: 1  Level of Defect: Fat  Local anesthetic: 9 mL 1% lidocaine with epinephrine  Repair Type: Complex linear  Repair Size: 4.0 cm  Suture Material: 4-0 Monocryl; 5-0 fast absorbing gut    Procedure:    Stage I  We discussed the principles of treatment and most likely complications including scarring, bleeding, infection, swelling, pain, crusting, nerve damage, large wound,  incomplete excision, wound dehiscence,  nerve damage, recurrence, and a second procedure may be recommended to obtain the best cosmetic or functional result.    Informed consent was obtained and the patient underwent the procedure as follows:  The patient was placed supine on the operating table.  The cancer was identified, outlined with a marker, and verified by the patient.  The entire surgical field was prepped with chlorhexidine.  The surgical site was anesthetized using lidocaine with epinephrine.    The area of clinically apparent tumor was debulked. The layer of tissue was then surgically excised using a #15 blade and was then transferred onto a specimen sheet maintaining the orientation of the specimen. Hemostasis was obtained  using electrocoagulation. The wound site was then covered with a dressing while the tissue samples were processed for examination.    The excised tissue was transported to the Mohs histology laboratory maintaining the tissue orientation.  The tissue specimen was relaxed so that the entire surgical margin was in a a single horizontal plane for sectioning and inked for precise mapping.  A precise reference map was drawn to reflect the sectioning of the specimen, colored inking of the margins, and orientation on the patient.  The tissue was processed using horizontal sectioning of the base and continuous peripheral margins.  The histopathologic sections were reviewed in conjunction with the reference map.    Total blocks: 1  Total slides: 2    There were no cancer cells visualized on examination, therefore Mohs surgery was complete.    REPAIR:     Due to one or more of the following factors, complex linear closure was required/indicated: Extensive undermining (equal to or greater than the maximum perpendicular width of the defect), exposure of underlying structure (bone, cartilage, tendon, named neurovascular), free margin involvement (helical rim, vermillion border, nostril rim), and/or placement of retention sutures.    After the excision of the tumor, the area was extensively and carefully undermined using tissue scissors. Hemostasis was obtained with spot electrocautery and ligation of vessels where necessary. Initial deep plication sutures of 4-0 Monocryl sutures were placed in the deep, subcutaneous, and fascial planes to appose the lateral margins. The subcutaneous and dermal layers were then closed with 4-0 Monocryl sutures. The epidermis was then carefully approximated along the length of the wound using 5-0 fast absorbing gut running subcuticular sutures.     Estimated blood loss was less than 10 ml for all surgical sites. A sterile pressure dressing was applied and wound care instructions, with a written  handout, were given. The patient was discharged from the Dermatologic Surgery Center alert and ambulatory.    Dr. Jim Small (Mohs micrographic surgery fellow) performed the Mohs micrographic surgery and reconstruction under the direct supervision of Trenton Adrian MD, who was present for the entire micrographic surgery and key portions of the reconstruction, and always immediately available.    Jim Small MD  Dermatology, PGY-5  Mohs surgery fellow    Scribe Disclosure:  I, Neal Nuñez, am serving as a scribe to document services personally performed by Trenton Adrian MD based on data collection and the provider's statements to me.       Attending attestation:  I was present for key elements of the procedure and immediately available for all other portions of the procedure.  I have reviewed the note and edited it as necessary.    Trenton Adrian M.D.  Professor  Director of Dermatologic Surgery  Department of Dermatology  [facility]    Dermatology Surgery Clinic  [facility]  [address] [place], [place] [id number]

## 2023-03-28 ENCOUNTER — PRE VISIT (OUTPATIENT)
Dept: UROLOGY | Facility: CLINIC | Age: 43
End: 2023-03-28
Payer: COMMERCIAL

## 2023-03-28 DIAGNOSIS — C64.2 MALIGNANT NEOPLASM OF KIDNEY EXCLUDING RENAL PELVIS, LEFT (H): Primary | ICD-10-CM

## 2023-03-28 NOTE — CONFIDENTIAL NOTE
Reason for visit: review lab and imaging     Relevant information: hx left renal cancer    Records/imaging/labs/orders: lab and imaging scheduled same day prior to visit    At Rooming: carl Prakash  3/28/2023  1:06 PM

## 2023-04-12 ENCOUNTER — ANCILLARY PROCEDURE (OUTPATIENT)
Dept: GENERAL RADIOLOGY | Facility: CLINIC | Age: 43
End: 2023-04-12
Attending: UROLOGY
Payer: COMMERCIAL

## 2023-04-12 ENCOUNTER — LAB (OUTPATIENT)
Dept: LAB | Facility: CLINIC | Age: 43
End: 2023-04-12
Payer: COMMERCIAL

## 2023-04-12 ENCOUNTER — OFFICE VISIT (OUTPATIENT)
Dept: UROLOGY | Facility: CLINIC | Age: 43
End: 2023-04-12
Payer: COMMERCIAL

## 2023-04-12 ENCOUNTER — ANCILLARY PROCEDURE (OUTPATIENT)
Dept: CT IMAGING | Facility: CLINIC | Age: 43
End: 2023-04-12
Attending: UROLOGY
Payer: COMMERCIAL

## 2023-04-12 VITALS — DIASTOLIC BLOOD PRESSURE: 91 MMHG | HEART RATE: 87 BPM | SYSTOLIC BLOOD PRESSURE: 142 MMHG

## 2023-04-12 DIAGNOSIS — C64.2 MALIGNANT NEOPLASM OF KIDNEY EXCLUDING RENAL PELVIS, LEFT (H): ICD-10-CM

## 2023-04-12 DIAGNOSIS — C64.2 MALIGNANT NEOPLASM OF KIDNEY EXCLUDING RENAL PELVIS, LEFT (H): Primary | ICD-10-CM

## 2023-04-12 LAB
ANION GAP SERPL CALCULATED.3IONS-SCNC: 11 MMOL/L (ref 7–15)
BUN SERPL-MCNC: 21.8 MG/DL (ref 6–20)
CALCIUM SERPL-MCNC: 9 MG/DL (ref 8.6–10)
CHLORIDE SERPL-SCNC: 104 MMOL/L (ref 98–107)
CREAT BLD-MCNC: 1 MG/DL (ref 0.5–1)
CREAT SERPL-MCNC: 0.99 MG/DL (ref 0.51–0.95)
DEPRECATED HCO3 PLAS-SCNC: 23 MMOL/L (ref 22–29)
GFR SERPL CREATININE-BSD FRML MDRD: 72 ML/MIN/1.73M2
GFR SERPL CREATININE-BSD FRML MDRD: >60 ML/MIN/1.73M2
GLUCOSE SERPL-MCNC: 104 MG/DL (ref 70–99)
POTASSIUM SERPL-SCNC: 4.4 MMOL/L (ref 3.4–5.3)
SODIUM SERPL-SCNC: 138 MMOL/L (ref 136–145)

## 2023-04-12 PROCEDURE — 74178 CT ABD&PLV WO CNTR FLWD CNTR: CPT | Mod: GC | Performed by: STUDENT IN AN ORGANIZED HEALTH CARE EDUCATION/TRAINING PROGRAM

## 2023-04-12 PROCEDURE — 82565 ASSAY OF CREATININE: CPT | Mod: 59 | Performed by: PATHOLOGY

## 2023-04-12 PROCEDURE — 99214 OFFICE O/P EST MOD 30 MIN: CPT | Performed by: UROLOGY

## 2023-04-12 PROCEDURE — 71046 X-RAY EXAM CHEST 2 VIEWS: CPT | Mod: GC | Performed by: RADIOLOGY

## 2023-04-12 PROCEDURE — 36415 COLL VENOUS BLD VENIPUNCTURE: CPT | Performed by: PATHOLOGY

## 2023-04-12 PROCEDURE — 80048 BASIC METABOLIC PNL TOTAL CA: CPT | Performed by: PATHOLOGY

## 2023-04-12 RX ORDER — IOPAMIDOL 755 MG/ML
106 INJECTION, SOLUTION INTRAVASCULAR ONCE
Status: COMPLETED | OUTPATIENT
Start: 2023-04-12 | End: 2023-04-12

## 2023-04-12 RX ADMIN — IOPAMIDOL 106 ML: 755 INJECTION, SOLUTION INTRAVASCULAR at 15:16

## 2023-04-12 ASSESSMENT — PAIN SCALES - GENERAL: PAINLEVEL: NO PAIN (0)

## 2023-04-12 NOTE — PATIENT INSTRUCTIONS
Follow-up in one year with Kristian Javier PA-C with labs and imaging prior.    It was a pleasure meeting with you today.  Thank you for allowing me and my team the privilege of caring for you today.  YOU are the reason we are here, and I truly hope we provided you with the excellent service you deserve.  Please let us know if there is anything else we can do for you so that we can be sure you are leaving completely satisfied with your care experience.

## 2023-04-12 NOTE — LETTER
4/12/2023       RE: Caroline Pride  [address]   [address]  [facility] [address]     Dear Colleague,    Thank you for referring your patient, Caroline Pride, to the [facility] [place] at [facility]. Please see a copy of my visit note below.    Urology Clinic     HPI  Caroline Pride is a 42 year old female with history of kidney cancer, here for follow-up.      The patient denies any issues     No changes to health, hospitalizations or new diagnoses in the interim    PHYSICAL EXAM  BP (!) 142/91   Pulse 87    Constitutional: AO, pleasant, NAD  Resp: Non-labored breathing on room air  Abd: Soft, NT, ND  Ext WWP     Labs  Lab Results   Component Value Date    WBC 12.8 10/01/2022    WBC 11.4 05/13/2020     Lab Results   Component Value Date    RBC 3.77 10/01/2022    RBC 3.95 05/13/2020     Lab Results   Component Value Date    HGB 10.8 10/01/2022    HGB 10.9 05/15/2020     Lab Results   Component Value Date    HCT 33.3 10/01/2022    HCT 35.2 05/13/2020     No components found for: MCT  Lab Results   Component Value Date    MCV 88 10/01/2022    MCV 89 05/13/2020     Lab Results   Component Value Date    MCH 28.6 10/01/2022    MCH 30.4 05/13/2020     Lab Results   Component Value Date    MCHC 32.4 10/01/2022    MCHC 34.1 05/13/2020     Lab Results   Component Value Date    RDW 13.3 10/01/2022    RDW 13.9 05/13/2020     Lab Results   Component Value Date     10/01/2022     05/13/2020        Last Comprehensive Metabolic Panel:  Sodium   Date Value Ref Range Status   04/12/2023 138 136 - 145 mmol/L Final   12/06/2017 141 133 - 144 mmol/L Final     Potassium   Date Value Ref Range Status   04/12/2023 4.4 3.4 - 5.3 mmol/L Final   08/17/2022 4.2 3.4 - 5.3 mmol/L Final   12/06/2017 4.2 3.4 - 5.3 mmol/L Final     Chloride   Date Value Ref Range Status   04/12/2023 104 98 - 107 mmol/L Final   08/17/2022 108 94 - 109 mmol/L Final    12/06/2017 108 94 - 109 mmol/L Final     Carbon Dioxide   Date Value Ref Range Status   12/06/2017 24 20 - 32 mmol/L Final     Carbon Dioxide (CO2)   Date Value Ref Range Status   04/12/2023 23 22 - 29 mmol/L Final   08/17/2022 21 20 - 32 mmol/L Final     Anion Gap   Date Value Ref Range Status   04/12/2023 11 7 - 15 mmol/L Final   08/17/2022 8 3 - 14 mmol/L Final   12/06/2017 9 3 - 14 mmol/L Final     Glucose   Date Value Ref Range Status   04/12/2023 104 (H) 70 - 99 mg/dL Final   08/17/2022 104 (H) 70 - 99 mg/dL Final   12/06/2017 112 (H) 70 - 99 mg/dL Final     GLUCOSE BY METER POCT   Date Value Ref Range Status   09/30/2022 120 (H) 70 - 99 mg/dL Final     Urea Nitrogen   Date Value Ref Range Status   04/12/2023 21.8 (H) 6.0 - 20.0 mg/dL Final   08/17/2022 16 7 - 30 mg/dL Final   12/06/2017 13 7 - 30 mg/dL Final     Creatinine   Date Value Ref Range Status   04/12/2023 0.99 (H) 0.51 - 0.95 mg/dL Final   05/13/2020 0.48 (L) 0.52 - 1.04 mg/dL Final     Creatinine POCT   Date Value Ref Range Status   04/12/2023 1.0 0.5 - 1.0 mg/dL Final     GFR Estimate   Date Value Ref Range Status   04/12/2023 72 >60 mL/min/1.73m2 Final     Comment:     eGFR calculated using 2021 CKD-EPI equation.   05/13/2020 >90 >60 mL/min/[1.73_m2] Final     Comment:     Non  GFR Calc  Starting 12/18/2018, serum creatinine based estimated GFR (eGFR) will be   calculated using the Chronic Kidney Disease Epidemiology Collaboration   (CKD-EPI) equation.       GFR, ESTIMATED POCT   Date Value Ref Range Status   04/12/2023 >60 >60 mL/min/1.73m2 Final     Calcium   Date Value Ref Range Status   04/12/2023 9.0 8.6 - 10.0 mg/dL Final   12/06/2017 9.0 8.5 - 10.1 mg/dL Final     Bilirubin Total   Date Value Ref Range Status   08/17/2022 0.4 0.2 - 1.3 mg/dL Final   12/06/2017 0.4 0.2 - 1.3 mg/dL Final     Alkaline Phosphatase   Date Value Ref Range Status   08/17/2022 57 40 - 150 U/L Final   12/06/2017 52 40 - 150 U/L Final     ALT    Date Value Ref Range Status   08/17/2022 20 0 - 50 U/L Final   05/13/2020 18 0 - 50 U/L Final     AST   Date Value Ref Range Status   08/17/2022 4 0 - 45 U/L Final   05/13/2020 5 0 - 45 U/L Final       Imaging   Formal read is pending but no evidence of localized or distant metastasis      ASSESSMENT AND PLAN  42 year old female with [id number] status post radical left nephrectomy on 9/30/2022 doing well JESSICA     Plan   Follow-up in 1 yr with CXR, CT abdomen pelvis and BMP     30 total minutes spent on the date of the encounter including direct interaction with the patient, performing chart review, documentation and further activities as noted above    Luis Fernando Sigala MD   Department of Urology   [facility]

## 2023-04-12 NOTE — NURSING NOTE
Chief Complaint   Patient presents with     Follow Up       Blood pressure (!) 142/91, pulse 87, not currently breastfeeding. There is no height or weight on file to calculate BMI.    Patient Active Problem List   Diagnosis     Other procreative management counseling and advice     Generalized anxiety disorder     Subclinical hypothyroidism     Vitamin D deficiency     Need for Tdap vaccination     AMA (advanced maternal age) primigravida 35+, first trimester     Labor and delivery, indication for care     Alcohol consumption binge drinking     History of PCOS     Irregular periods/menstrual cycles     Non-celiac gluten sensitivity     Perforated appendicitis     Tinea versicolor     Fatigue, unspecified type     Anemia, unspecified type     Hx of appendicitis     Left renal mass       No Known Allergies    Current Outpatient Medications   Medication Sig Dispense Refill     acetaminophen (TYLENOL) 325 MG tablet Take 3 tablets (975 mg) by mouth 4 times daily 180 tablet 0     IRON PO        ketoconazole (NIZORAL) 2 % external cream Apply topically daily (Patient not taking: Reported on 2023) 60 g 1     Omega-3 Fatty Acids (FISH OIL PO)  (Patient not taking: Reported on 3/6/2023)       valACYclovir (VALTREX) 1000 mg tablet Take 2 tablets (2,000 mg) by mouth 2 times daily (Patient not taking: Reported on 2023) 12 tablet 6       Social History     Tobacco Use     Smoking status: Former     Packs/day: 0.50     Years: 2.00     Pack years: 1.00     Types: Cigarettes     Quit date: 2019     Years since quittin.2     Smokeless tobacco: Never   Substance Use Topics     Alcohol use: Yes     Comment: occasional     Drug use: No       Edgar Georges EMT-P  2023  3:54 PM

## 2023-04-12 NOTE — PROGRESS NOTES
Urology Clinic     HPI  Caroline Pride is a 42 year old female with history of kidney cancer, here for follow-up.      The patient denies any issues     No changes to health, hospitalizations or new diagnoses in the interim    PHYSICAL EXAM  BP (!) 142/91   Pulse 87    Constitutional: AO, pleasant, NAD  Resp: Non-labored breathing on room air  Abd: Soft, NT, ND  Ext WWP     Labs  Lab Results   Component Value Date    WBC 12.8 10/01/2022    WBC 11.4 05/13/2020     Lab Results   Component Value Date    RBC 3.77 10/01/2022    RBC 3.95 05/13/2020     Lab Results   Component Value Date    HGB 10.8 10/01/2022    HGB 10.9 05/15/2020     Lab Results   Component Value Date    HCT 33.3 10/01/2022    HCT 35.2 05/13/2020     No components found for: MCT  Lab Results   Component Value Date    MCV 88 10/01/2022    MCV 89 05/13/2020     Lab Results   Component Value Date    MCH 28.6 10/01/2022    MCH 30.4 05/13/2020     Lab Results   Component Value Date    MCHC 32.4 10/01/2022    MCHC 34.1 05/13/2020     Lab Results   Component Value Date    RDW 13.3 10/01/2022    RDW 13.9 05/13/2020     Lab Results   Component Value Date     10/01/2022     05/13/2020        Last Comprehensive Metabolic Panel:  Sodium   Date Value Ref Range Status   04/12/2023 138 136 - 145 mmol/L Final   12/06/2017 141 133 - 144 mmol/L Final     Potassium   Date Value Ref Range Status   04/12/2023 4.4 3.4 - 5.3 mmol/L Final   08/17/2022 4.2 3.4 - 5.3 mmol/L Final   12/06/2017 4.2 3.4 - 5.3 mmol/L Final     Chloride   Date Value Ref Range Status   04/12/2023 104 98 - 107 mmol/L Final   08/17/2022 108 94 - 109 mmol/L Final   12/06/2017 108 94 - 109 mmol/L Final     Carbon Dioxide   Date Value Ref Range Status   12/06/2017 24 20 - 32 mmol/L Final     Carbon Dioxide (CO2)   Date Value Ref Range Status   04/12/2023 23 22 - 29 mmol/L Final   08/17/2022 21 20 - 32 mmol/L Final     Anion Gap   Date Value Ref Range Status   04/12/2023 11 7 - 15 mmol/L  Final   08/17/2022 8 3 - 14 mmol/L Final   12/06/2017 9 3 - 14 mmol/L Final     Glucose   Date Value Ref Range Status   04/12/2023 104 (H) 70 - 99 mg/dL Final   08/17/2022 104 (H) 70 - 99 mg/dL Final   12/06/2017 112 (H) 70 - 99 mg/dL Final     GLUCOSE BY METER POCT   Date Value Ref Range Status   09/30/2022 120 (H) 70 - 99 mg/dL Final     Urea Nitrogen   Date Value Ref Range Status   04/12/2023 21.8 (H) 6.0 - 20.0 mg/dL Final   08/17/2022 16 7 - 30 mg/dL Final   12/06/2017 13 7 - 30 mg/dL Final     Creatinine   Date Value Ref Range Status   04/12/2023 0.99 (H) 0.51 - 0.95 mg/dL Final   05/13/2020 0.48 (L) 0.52 - 1.04 mg/dL Final     Creatinine POCT   Date Value Ref Range Status   04/12/2023 1.0 0.5 - 1.0 mg/dL Final     GFR Estimate   Date Value Ref Range Status   04/12/2023 72 >60 mL/min/1.73m2 Final     Comment:     eGFR calculated using 2021 CKD-EPI equation.   05/13/2020 >90 >60 mL/min/[1.73_m2] Final     Comment:     Non  GFR Calc  Starting 12/18/2018, serum creatinine based estimated GFR (eGFR) will be   calculated using the Chronic Kidney Disease Epidemiology Collaboration   (CKD-EPI) equation.       GFR, ESTIMATED POCT   Date Value Ref Range Status   04/12/2023 >60 >60 mL/min/1.73m2 Final     Calcium   Date Value Ref Range Status   04/12/2023 9.0 8.6 - 10.0 mg/dL Final   12/06/2017 9.0 8.5 - 10.1 mg/dL Final     Bilirubin Total   Date Value Ref Range Status   08/17/2022 0.4 0.2 - 1.3 mg/dL Final   12/06/2017 0.4 0.2 - 1.3 mg/dL Final     Alkaline Phosphatase   Date Value Ref Range Status   08/17/2022 57 40 - 150 U/L Final   12/06/2017 52 40 - 150 U/L Final     ALT   Date Value Ref Range Status   08/17/2022 20 0 - 50 U/L Final   05/13/2020 18 0 - 50 U/L Final     AST   Date Value Ref Range Status   08/17/2022 4 0 - 45 U/L Final   05/13/2020 5 0 - 45 U/L Final       Imaging   Formal read is pending but no evidence of localized or distant metastasis      ASSESSMENT AND PLAN  42 year old female  with [id number] status post radical left nephrectomy on 9/30/2022 doing well JESSICA     Plan   Follow-up in 1 yr with CXR, CT abdomen pelvis and BMP     30 total minutes spent on the date of the encounter including direct interaction with the patient, performing chart review, documentation and further activities as noted above    Luis Fernando Sigala MD   Department of Urology   [facility]

## 2023-07-18 ENCOUNTER — PATIENT OUTREACH (OUTPATIENT)
Dept: CARE COORDINATION | Facility: CLINIC | Age: 43
End: 2023-07-18
Payer: COMMERCIAL

## 2023-08-01 ENCOUNTER — PATIENT OUTREACH (OUTPATIENT)
Dept: CARE COORDINATION | Facility: CLINIC | Age: 43
End: 2023-08-01
Payer: COMMERCIAL

## 2023-08-24 ENCOUNTER — OFFICE VISIT (OUTPATIENT)
Dept: DERMATOLOGY | Facility: CLINIC | Age: 43
End: 2023-08-24
Payer: COMMERCIAL

## 2023-08-24 DIAGNOSIS — D48.5 NEOPLASM OF UNCERTAIN BEHAVIOR OF SKIN: Primary | ICD-10-CM

## 2023-08-24 DIAGNOSIS — Z85.828 HISTORY OF BASAL CELL CARCINOMA: ICD-10-CM

## 2023-08-24 DIAGNOSIS — L57.8 ACTINIC SKIN DAMAGE: ICD-10-CM

## 2023-08-24 PROCEDURE — 88305 TISSUE EXAM BY PATHOLOGIST: CPT | Mod: 26 | Performed by: DERMATOLOGY

## 2023-08-24 PROCEDURE — 88305 TISSUE EXAM BY PATHOLOGIST: CPT | Mod: TC | Performed by: STUDENT IN AN ORGANIZED HEALTH CARE EDUCATION/TRAINING PROGRAM

## 2023-08-24 PROCEDURE — 99213 OFFICE O/P EST LOW 20 MIN: CPT | Mod: 25 | Performed by: STUDENT IN AN ORGANIZED HEALTH CARE EDUCATION/TRAINING PROGRAM

## 2023-08-24 PROCEDURE — 11301 SHAVE SKIN LESION 0.6-1.0 CM: CPT | Performed by: STUDENT IN AN ORGANIZED HEALTH CARE EDUCATION/TRAINING PROGRAM

## 2023-08-24 ASSESSMENT — PAIN SCALES - GENERAL: PAINLEVEL: NO PAIN (1)

## 2023-08-24 NOTE — PROGRESS NOTES
[facility] Dermatology Note    Encounter Date: Aug 24, 2023    Dermatology Problem List:  #Hx NMSC  - s&nBCC central back  11/14/22  - nBCC posterior neck 01/31/23    Major PMHx  -   ______________________________________    Impression/Plan:  Caroline was seen today for skin check.    Diagnoses and all orders for this visit:    Neoplasm of uncertain behavior of skin  -     SHAV SKIN LESION TRUNK/ARM/LEG 0.6-1.0 CM  -Painful, getting caught on clothing, occasionally bleeding, 8mm  - Due to intolerable side effects, shave removal performed for palliation of symptoms  - See procedure note    History of basal cell carcinoma  Actinic skin damage  - discussed sunprotective behaviors, clothing, and the use of sunscreen        - SHAVE Removal PROCEDURE NOTE: After written informed consent was obtained, a time out was taken to identify the patient and the correct site for removal. The lesion on the R thigh, 8mm,  was cleansed with a 70% isopropyl alcohol wipe, and then injected with 1cc of lidocaine 1% with epinephrine 1:100,000. Once anesthesia was ensured, the visible surface of the lesion was shaved with intent to remove the entire lesion using a [place] blade in standard technique. Hemostasis was obtained with pressure and aluminum chloride 20% solution. The wound was dressed with white petrolatum and an adhesive bandage. The patient tolerated the procedure well. Post-procedure instructions and recommendations were provided both verbally and in writing.    Follow-up PRN.       Staff Involved:  Staff Only    Ata Adrian MD   of Dermatology  Department of Dermatology  [facility]      CC:   Chief Complaint   Patient presents with    Skin Check     Spot check. Spot is on L leg above knee.        History of Present Illness:  Ms. Caroline Pride is a 42 year old female who presents as a return patient.    Spot on R leg appeared after trying to pop a  zit. Red spot appeared, feels it is growing, bleeding often and ruining her close    Labs:      Physical exam:  Vitals: There were no vitals taken for this visit.  GEN: well developed, well-nourished, in no acute distress, in a pleasant mood.     SKIN: Chaney phototype 1  - Sun-exposed skin, which includes the head/face, neck, both arms, digits, and/or nails was examined.   - well healed scar at site of previous excision  - Flat brown macules and patches in a sun exposed areas on face and extremities  -Pink vascular papule right thigh with collarette at the base  - No other lesions of concern on areas examined.     Past Medical History:   Past Medical History:   Diagnosis Date    Depressive disorder ongoing    PCOS (polycystic ovarian syndrome)     Subclinical hypothyroidism      Past Surgical History:   Procedure Laterality Date    DAVINCI NEPHRECTOMY Left 2022    Procedure: NEPHRECTOMY, ROBOT-ASSISTED;  Surgeon: Luis Fernando Sigala MD;  Location: [facility] OR    LAPAROSCOPIC APPENDECTOMY N/A 2022    Procedure: APPENDECTOMY, LAPAROSCOPIC;  Surgeon: Heron Montana DO;  Location: [facility] OR       Social History:   reports that she quit smoking about 4 years ago. Her smoking use included cigarettes. She has a 1.00 pack-year smoking history. She has never used smokeless tobacco. She reports current alcohol use. She reports that she does not use drugs.    Family History:  Family History   Problem Relation Age of Onset    Thyroid Disease Mother     Anemia Mother     Depression Father     Anxiety Disorder Father     Heart Disease Father     Breast Cancer Maternal Grandmother          at 42y    Hypertension Sister     Anxiety Disorder Sister     Thyroid Disease Sister     Hypertension Sister     Anxiety Disorder Sister     Thyroid Disease Sister     Parkinsonism Maternal Uncle     Melanoma No family hx of     Skin Cancer No family hx of        Medications:  Current Outpatient Medications   Medication Sig  Dispense Refill    valACYclovir (VALTREX) 1000 mg tablet Take 2 tablets (2,000 mg) by mouth 2 times daily 12 tablet 6     No Known Allergies

## 2023-08-24 NOTE — NURSING NOTE
Dermatology Rooming Note    Caroline Pride's goals for this visit include:   Chief Complaint   Patient presents with    Skin Check     Spot check. Spot is on L leg above knee.      Nova Longo, EMT     63

## 2023-08-24 NOTE — PROGRESS NOTES
Lidocaine-epinephrine 1-1:196024 % injection   1.0mL once for one use, starting 8/24/2023 ending 8/24/2023,  2mL disp, R-0, injection  Injected by Dr. Adrian

## 2023-08-24 NOTE — PATIENT INSTRUCTIONS
Wound Care After a Biopsy    What is a skin biopsy?  A skin biopsy allows the doctor to examine a very small piece of tissue under the microscope to determine the diagnosis and the best treatment for the skin condition. A local anesthetic (numbing medicine) is injected with a very small needle into the skin area to be tested. A small piece of skin is taken from the area. Sometimes a suture (stitch) is used.     What are the risks of a skin biopsy?  I will experience scar, bleeding, swelling, pain, crusting and redness. I may experience incomplete removal or recurrence. Risks of this procedure are excessive bleeding, bruising, infection, nerve damage, numbness, thick (hypertrophic or keloidal) scar and non-diagnostic biopsy.    How should I care for my wound for the first 24 hours?  Keep the wound dry and covered for 24 hours  If it bleeds, hold direct pressure on the area for 15 minutes. If bleeding does not stop, call us or go to the emergency room  Avoid strenuous exercise the first 1-2 days or as your doctor instructs you    How should I care for the wound after 24 hours?  After 24 hours, remove the bandage  You may bathe or shower as normal  If you had a scalp biopsy, you can shampoo as usual and can use shower water to clean the biopsy site daily  Clean the wound once a day with gentle soap and water  Do not scrub, be gentle  Apply white petroleum/Vaseline after cleaning the wound with a cotton swab or a clean finger, and keep the site covered with a Bandaid /bandage. Bandages are not necessary with a scalp biopsy  If you are unable to cover the site with a Bandaid /bandage, re-apply ointment 2-3 times a day to keep the site moist. Moisture will help with healing  Avoid strenuous activity for first 1-2 days  Avoid lakes, rivers, pools, and oceans until the stitches are removed or the site is healed    How do I clean my wound?  Wash hands thoroughly with soap or use hand  before all wound care  Clean  the wound with gentle soap and water  Apply white petroleum/Vaseline  to wound after it is clean  Replace the Bandaid /bandage to keep the wound covered for the first few days or as instructed by your doctor  If you had a scalp biopsy, warm shower water to the area on a daily basis should suffice    What should I use to clean my wound?   Cotton-tipped applicators (Qtips )  White petroleum jelly (Vaseline ). Use a clean new container and use Q-tips to apply.  Bandaids  as needed  Gentle soap     How should I care for my wound long term?  Do not get your wound dirty  Keep up with wound care for one week or until the area is healed.  A small scab will form and fall off by itself when the area is completely healed. The area will be red and will become pink in color as it heals. Sun protection is very important for how your scar will turn out. Sunscreen with an SPF 30 or greater is recommended once the area is healed.  You should have some soreness but it should be mild and slowly go away over several days. Talk to your doctor about using tylenol for pain,    When should I call my doctor?  If you have increased:   Pain or swelling  Pus or drainage (clear or slightly yellow drainage is ok)  Temperature over 100F  Spreading redness or warmth around wound    When will I hear about my results?  The biopsy results can take 2 weeks to come back.  Your results will automatically release to [employer] before your provider has even reviewed them.  The clinic will call you with the results, send you a [employer] message, or have you schedule a follow-up clinic or phone time to discuss the results.  Contact our clinics if you do not hear from us in 2 weeks.    Who should I call with questions?  [facility]: [phone]  [facility]: [phone]  For urgent needs outside of business hours call the [facility] at [phone] and ask for the dermatology resident on call

## 2023-08-24 NOTE — LETTER
8/24/2023       RE: Caroline Pride  [address]   [address]  [facility] [address]     Dear Colleague,    Thank you for referring your patient, Caroline Pride, to the [facility] DERMATOLOGY CLINIC [place] at [facility]. Please see a copy of my visit note below.    [facility] Dermatology Note    Encounter Date: Aug 24, 2023    Dermatology Problem List:  #Hx NMSC  - s&nBCC central back  11/14/22  - nBCC posterior neck 01/31/23    Major PMHx  -   ______________________________________    Impression/Plan:  Caroline was seen today for skin check.    Diagnoses and all orders for this visit:    Neoplasm of uncertain behavior of skin  -     SHAV SKIN LESION TRUNK/ARM/LEG 0.6-1.0 CM  -Painful, getting caught on clothing, occasionally bleeding, 8mm  - Due to intolerable side effects, shave removal performed for palliation of symptoms  - See procedure note    History of basal cell carcinoma  Actinic skin damage  - discussed sunprotective behaviors, clothing, and the use of sunscreen        - SHAVE Removal PROCEDURE NOTE: After written informed consent was obtained, a time out was taken to identify the patient and the correct site for removal. The lesion on the R thigh, 8mm,  was cleansed with a 70% isopropyl alcohol wipe, and then injected with 1cc of lidocaine 1% with epinephrine 1:100,000. Once anesthesia was ensured, the visible surface of the lesion was shaved with intent to remove the entire lesion using a [place] blade in standard technique. Hemostasis was obtained with pressure and aluminum chloride 20% solution. The wound was dressed with white petrolatum and an adhesive bandage. The patient tolerated the procedure well. Post-procedure instructions and recommendations were provided both verbally and in writing.    Follow-up PRN.       Staff Involved:  Staff Only    Ata Adrian MD   of Dermatology  Department of  Dermatology  [facility]      CC:   Chief Complaint   Patient presents with    Skin Check     Spot check. Spot is on L leg above knee.        History of Present Illness:  Ms. Caroline Pride is a 42 year old female who presents as a return patient.    Spot on R leg appeared after trying to pop a zit. Red spot appeared, feels it is growing, bleeding often and ruining her close    Labs:      Physical exam:  Vitals: There were no vitals taken for this visit.  GEN: well developed, well-nourished, in no acute distress, in a pleasant mood.     SKIN: Chaney phototype 1  - Sun-exposed skin, which includes the head/face, neck, both arms, digits, and/or nails was examined.   - well healed scar at site of previous excision  - Flat brown macules and patches in a sun exposed areas on face and extremities  -Pink vascular papule right thigh with collarette at the base  - No other lesions of concern on areas examined.     Past Medical History:   Past Medical History:   Diagnosis Date    Depressive disorder ongoing    PCOS (polycystic ovarian syndrome)     Subclinical hypothyroidism      Past Surgical History:   Procedure Laterality Date    DAVINCI NEPHRECTOMY Left 9/30/2022    Procedure: NEPHRECTOMY, ROBOT-ASSISTED;  Surgeon: Luis Fernando Sigala MD;  Location: [facility] OR    LAPAROSCOPIC APPENDECTOMY N/A 9/30/2022    Procedure: APPENDECTOMY, LAPAROSCOPIC;  Surgeon: Heron Montana DO;  Location:  OR       Social History:   reports that she quit smoking about 4 years ago. Her smoking use included cigarettes. She has a 1.00 pack-year smoking history. She has never used smokeless tobacco. She reports current alcohol use. She reports that she does not use drugs.    Family History:  Family History   Problem Relation Age of Onset    Thyroid Disease Mother     Anemia Mother     Depression Father     Anxiety Disorder Father     Heart Disease Father     Breast Cancer Maternal Grandmother           at 42y    Hypertension Sister     Anxiety Disorder Sister     Thyroid Disease Sister     Hypertension Sister     Anxiety Disorder Sister     Thyroid Disease Sister     Parkinsonism Maternal Uncle     Melanoma No family hx of     Skin Cancer No family hx of        Medications:  Current Outpatient Medications   Medication Sig Dispense Refill    valACYclovir (VALTREX) 1000 mg tablet Take 2 tablets (2,000 mg) by mouth 2 times daily 12 tablet 6     No Known Allergies

## 2023-11-22 NOTE — PROGRESS NOTES
Assessment & Plan     Fatigue, unspecified type  Ongoing symptoms for the past 2 to 3 months.  Hemoglobin is checked today and is normal.  White blood cell count is slightly elevated.  Unclear etiology at this time.  TSH, vitamin D, CMP, vitamin B12, magnesium, Lyme test are pending at this time.  Patient will be notified if any abnormal results.  Keep monitoring symptoms.  She agrees with the plan.  - TSH with free T4 reflex  - Vitamin D Deficiency  - CBC with platelets and differential  - Comprehensive metabolic panel (BMP + Alb, Alk Phos, ALT, AST, Total. Bili, TP)  - Vitamin B12  - Magnesium  - Lyme Disease Total Abs Bld with Reflex to Confirm CLIA    Arthralgia, unspecified joint  ESR is elevated at 85 today. Unclear etiology at this time. CRP is pending.  Lyme's test is also pending.  Patient will be notified if any abnormal results.  Keep monitoring symptoms.  Ultimately will need follow-up with primary care provider and/or rheumatologist if symptoms do not improve as anticipated.  Patient agrees with the plan.  - ESR: Erythrocyte sedimentation rate  - CRP, inflammation  - Lyme Disease Total Abs Bld with Reflex to Confirm CLIA      Pelvic pain in female  Acute problem, ongoing for the past couple weeks.  On exam patient is in no acute distress.  Vitals are stable.  Pelvic ultrasound is ordered and is pending at this time.  Patient will be notified if any abnormal results.  We discussed red flag symptoms and when to seek emergent care.  Patient agrees with the plan.  - US Pelvic Complete with Transvaginal     30 minutes spent on the date of the encounter doing chart review, history and exam, patient education, documentation and further activities per the note        Return in about 1 week (around 6/29/2022) for Symptoms failing to improve.    Shagufta Shrestha PA-C  [facility] CARE [place]    Brandy Velazquez is a 41 year old female who presents to clinic today for the following health  Recorded Pressure: LV, HR=68, Condition=Condition 1 (Left Ventricle) /3/15 "issues:  Chief Complaint   Patient presents with     Urgent Care     GI Problem     Constipation and used some laxatives but having lower abdominal pain. Fatigue going on x 1 month.      HPI    Patient is presenting to urgent care today with 3 complaints.  First complaint is fatigue.  Off-and-on for the past 2 to 3 months.  She reports she has been feeling sick off and on with what she presumed to be a viral illness. Currently no cough.  No vomiting or diarrhea.  No SOB. No CP.  No fevers or chills.  Has had headaches and joint pain off and on. No known tick bites. No unusual rashes.     Second complaint today is pelvic pain.  Onset 2 to 3 weeks ago.  No abnormal vaginal discharge, no dysuria.  Patient denies any chances for pregnancy.  No concern for STD.  She has a history of PCOS.     Third complaint is constipation for a few days. Took laxative yesterday which helped some. Now has lower pelvic pain. No new supplements.  LMP: a month ago        Review of Systems  Constitutional, HEENT, cardiovascular, pulmonary, GI, , musculoskeletal, neuro, skin, endocrine and psych systems are negative, except as otherwise noted.      Objective    /84   Pulse 97   Temp 97.5  F (36.4  C) (Temporal)   Resp 15   Ht 1.727 m (5' 8\")   Wt 85.3 kg (188 lb)   LMP 05/22/2022   SpO2 99%   Breastfeeding No   BMI 28.59 kg/m    Physical Exam   GENERAL: healthy, alert and no distress  HENT: mouth without ulcers or lesions  RESP: lungs clear to auscultation - no rales, rhonchi or wheezes  CV: regular rate and rhythm, normal S1 S2  ABDOMEN: soft,  Mild tenderness suprapubic area, no hepatosplenomegaly, no masses and bowel sounds normal  MS: no gross musculoskeletal defects noted, no edema    Results for orders placed or performed in visit on 06/22/22 (from the past 24 hour(s))   CBC with platelets and differential    Narrative    The following orders were created for panel order CBC with platelets and differential.  Procedure  "                              Abnormality         Status                     ---------                               -----------         ------                     CBC with platelets and d...[[id number]]  Abnormal            Final result                 Please view results for these tests on the individual orders.   ESR: Erythrocyte sedimentation rate   Result Value Ref Range    Erythrocyte Sedimentation Rate 85 (H) 0 - 20 mm/hr   CBC with platelets and differential   Result Value Ref Range    WBC Count 14.2 (H) 4.0 - 11.0 10e3/uL    RBC Count 4.16 3.80 - 5.20 10e6/uL    Hemoglobin 11.8 11.7 - 15.7 g/dL    Hematocrit 35.3 35.0 - 47.0 %    MCV 85 78 - 100 fL    MCH 28.4 26.5 - 33.0 pg    MCHC 33.4 31.5 - 36.5 g/dL    RDW 12.7 10.0 - 15.0 %    Platelet Count 432 150 - 450 10e3/uL    % Neutrophils 83 %    % Lymphocytes 9 %    % Monocytes 6 %    % Eosinophils 2 %    % Basophils 0 %    % Immature Granulocytes 0 %    Absolute Neutrophils 11.8 (H) 1.6 - 8.3 10e3/uL    Absolute Lymphocytes 1.3 0.8 - 5.3 10e3/uL    Absolute Monocytes 0.9 0.0 - 1.3 10e3/uL    Absolute Eosinophils 0.2 0.0 - 0.7 10e3/uL    Absolute Basophils 0.0 0.0 - 0.2 10e3/uL    Absolute Immature Granulocytes 0.0 <=0.4 10e3/uL

## 2024-01-16 ENCOUNTER — OFFICE VISIT (OUTPATIENT)
Dept: URGENT CARE | Facility: URGENT CARE | Age: 44
End: 2024-01-16
Payer: COMMERCIAL

## 2024-01-16 VITALS
HEART RATE: 83 BPM | BODY MASS INDEX: 30.31 KG/M2 | HEIGHT: 68 IN | DIASTOLIC BLOOD PRESSURE: 85 MMHG | OXYGEN SATURATION: 99 % | TEMPERATURE: 98 F | SYSTOLIC BLOOD PRESSURE: 131 MMHG | RESPIRATION RATE: 17 BRPM | WEIGHT: 200 LBS

## 2024-01-16 DIAGNOSIS — B00.1 COLD SORE: ICD-10-CM

## 2024-01-16 DIAGNOSIS — R07.0 THROAT PAIN: Primary | ICD-10-CM

## 2024-01-16 LAB
DEPRECATED S PYO AG THROAT QL EIA: NEGATIVE
GROUP A STREP BY PCR: NOT DETECTED

## 2024-01-16 PROCEDURE — 87651 STREP A DNA AMP PROBE: CPT | Performed by: PHYSICIAN ASSISTANT

## 2024-01-16 PROCEDURE — 99213 OFFICE O/P EST LOW 20 MIN: CPT | Performed by: PHYSICIAN ASSISTANT

## 2024-01-16 RX ORDER — VALACYCLOVIR HYDROCHLORIDE 1 G/1
2000 TABLET, FILM COATED ORAL 2 TIMES DAILY
Qty: 4 TABLET | Refills: 1 | Status: SHIPPED | OUTPATIENT
Start: 2024-01-16

## 2024-01-16 NOTE — PROGRESS NOTES
Assessment & Plan     1. Throat pain    - Streptococcus A Rapid Screen w/Reflex to PCR - Clinic Collect      This patient presented with sore throat and clinical evidence of pharyngitis.  The rapid strep test is negative. There is no clinical evidence of  peritonsillar abscess, retropharyngeal abscess, Lemierre's Syndrome, epiglottis, or Felix's angina. I suspect that patient has a viral sore throat. Encouraged supportive cares, fluids, rest, Tylenol / Ibuprofen for comfort.     The patient understands the need to return to the clinic or present to the ER with increasing pain, change in voice, neck pain, vomiting, inability to take fluids or shortness of breath.       Abby Montes De Oca PA-C  [facility] URGENT CARE [place]    CHIEF COMPLAINT:   Chief Complaint   Patient presents with    Pharyngitis     X2 days of sore throat  Son has strep     Urgent Care     Subjective     Caroline is a 43 year old female who presents to clinic today for evaluation of sore throat. Symptoms started two days ago. Son was recently diagnosed with strep throat.   Slight runny nose and congestion.       Past Medical History:   Diagnosis Date    Depressive disorder ongoing    PCOS (polycystic ovarian syndrome)     Subclinical hypothyroidism      Past Surgical History:   Procedure Laterality Date    DAVINCI NEPHRECTOMY Left 2022    Procedure: NEPHRECTOMY, ROBOT-ASSISTED;  Surgeon: Luis Fernando Sigala MD;  Location: [facility] OR    LAPAROSCOPIC APPENDECTOMY N/A 2022    Procedure: APPENDECTOMY, LAPAROSCOPIC;  Surgeon: Heron Montana DO;  Location:  OR     Social History     Tobacco Use    Smoking status: Former     Packs/day: 0.50     Years: 2.00     Additional pack years: 0.00     Total pack years: 1.00     Types: Cigarettes     Quit date: 2019     Years since quittin.0    Smokeless tobacco: Never   Substance Use Topics    Alcohol use: Yes     Comment: occasional     Current Outpatient Medications  "  Medication    valACYclovir (VALTREX) 1000 mg tablet    valACYclovir (VALTREX) 1000 mg tablet     No current facility-administered medications for this visit.     No Known Allergies    10 point ROS of systems were all negative except for pertinent positives noted in my HPI.      Exam:   /85   Pulse 83   Temp 98  F (36.7  C) (Temporal)   Resp 17   Ht 1.727 m (5' 8\")   Wt 90.7 kg (200 lb)   SpO2 99%   BMI 30.41 kg/m    Constitutional: healthy, alert and no distress  Head: Normocephalic, atraumatic.  Eyes: conjunctiva clear, no drainage  ENT: TMs clear and shiny reji, nasal mucosa pink and moist, throat erythematous without trismus or drooling.   Neck: neck is supple, no cervical lymphadenopathy or nuchal rigidity  Cardiovascular: RRR  Respiratory: CTA bilaterally, no rhonchi or rales  Skin: no rashes  Neurologic: Speech clear, gait normal. Moves all extremities.    Results for orders placed or performed in visit on 01/16/24   Streptococcus A Rapid Screen w/Reflex to PCR - Clinic Collect     Status: Normal    Specimen: Throat; Swab   Result Value Ref Range    Group A Strep antigen Negative Negative           "

## 2024-02-18 ENCOUNTER — HEALTH MAINTENANCE LETTER (OUTPATIENT)
Age: 44
End: 2024-02-18

## 2024-03-13 ENCOUNTER — TELEPHONE (OUTPATIENT)
Dept: DERMATOLOGY | Facility: CLINIC | Age: 44
End: 2024-03-13
Payer: COMMERCIAL

## 2024-03-13 NOTE — TELEPHONE ENCOUNTER
Left Voicemail (1st Attempt) and Sent Mychart (1st Attempt) for the patient to call back and schedule the following:    Appointment type: Return  Provider: Katharine Hernandez  Return date: 4/22/24  Specialty phone number: [phone]

## 2024-04-08 NOTE — PROGRESS NOTES
Subjective     REASON FOR VISIT  RCC follow-up    HISTORY OF PRESENT ILLNESS  Ms. Kareem Pride is a pleasant 43 year old female who presents today in follow-up for her history of clear-cell renal cell carcinoma status post left radical nephrectomy on 9/30/2022, final pathology showing pT1a grade group 2 ccRCC.  Since his surgery, she has followed up with her annual imaging and lab work, and presents today for the same.    Today:  Some concerns regarding the bump in her kidney function  Notes that she has just finished an 80-hour fast this morning    Objective     PHYSICAL EXAMINATION  General: Alert, oriented, no acute distress    LABS      Latest Reference Range & Units 04/10/24 11:59   Sodium 135 - 145 mmol/L 137   Potassium 3.4 - 5.3 mmol/L 4.6   Chloride 98 - 107 mmol/L 102   Carbon Dioxide (CO2) 22 - 29 mmol/L 20 (L)   Urea Nitrogen 6.0 - 20.0 mg/dL 21.9 (H)   Creatinine 0.51 - 0.95 mg/dL 1.12 (H)   GFR Estimate >60 mL/min/1.73m2 62   Calcium 8.6 - 10.0 mg/dL 9.4   Anion Gap 7 - 15 mmol/L 15   Albumin 3.5 - 5.2 g/dL 4.5   Protein Total 6.4 - 8.3 g/dL 8.4 (H)   Alkaline Phosphatase 40 - 150 U/L 63   ALT 0 - 50 U/L 15   AST 0 - 45 U/L 10   Bilirubin Total <=1.2 mg/dL 0.4   (L): Data is abnormally low  (H): Data is abnormally high    IMAGING  I personally reviewed and interpreted the CT abdomen with and without IV contrast which did not show any evidence of new or recurrent disease to my review.  Will wait for radiology review for final evaluation.  Also reviewed the chest x-ray and agree that there is no evidence of metastatic disease    Narrative & Impression   Exam: XR CHEST 2 VIEWS, 4/10/2024 11:54 AM     Indication: Malignant neoplasm of kidney excluding renal pelvis, left  (H)     Comparison: 4/12/2023     Findings:   2 views of the chest. Heart size within normal limits. No  pneumothorax, consolidation, or pleural effusion. Vascularity is  distinct. Unchanged chronic left mid clavicular deformity.                                                                       Impression: Negative chest.     I have personally reviewed the examination and initial interpretation  and I agree with the findings.     VIVIEN SIMMONS MD      Assessment   History of ccRCC s/p radical nephrectomy in Sept of 2022    It was my pleasure to meet with Caroline in follow-up for her history of clear-cell renal cell carcinoma status post radical nephrectomy in September 2022 with no evidence of recurrent disease up to this point.  I was first happy to let her know that on my review of her CT abdomen with and without IV contrast, I did not see any evidence of recurrence or new disease.  We will wait on the official radiology read for the true all clear.  Her chest x-ray however I agree was also clear of any evidence of metastatic disease.    We then spent some time reviewing her lab work, and I reassured her that her abnormalities at this time are most likely due to her recently ended 80-hour fast.  Her kidney function now that she only has 1 kidney is slightly more susceptible to changes in her hydration and chances are she was just not very hydrated prior to her lab work.  We did discuss possibly repeating lab work sooner, but with this explanation she felt comfortable waiting for the full year to get repeat lab work and imaging.  In that regard, given her low risk for recurrent or metastatic disease, AUA recommendations are to get a repeat CT abdomen with and without IV contrast, chest x-ray, and lab work in 1 year.  I will follow-up with her in person or virtually shortly after these tests.    Ms. Kareem Pride expressed understanding and agreement to the above discussion and plan and all of her questions were answered to her satisfaction.     PLAN  Repeat CT abdomen with and without IV contrast, chest x-ray, and BMP in 1 year with follow-up office or virtual visit with me shortly afterwards    SIGNED:    John Javier PA-C    I spent  a total of 22 minutes spent on the date of the encounter doing chart review, history and exam, documentation, and further activities as noted above.

## 2024-04-09 ENCOUNTER — PRE VISIT (OUTPATIENT)
Dept: UROLOGY | Facility: CLINIC | Age: 44
End: 2024-04-09
Payer: COMMERCIAL

## 2024-04-09 NOTE — TELEPHONE ENCOUNTER
Reason for visit: annual follow up/ Imaging and abs prior    Relevant information: was seen last year by  for malignant neoplasm of kidney excluding renal pelvis. Hx of kidney cancer, UTIs, and nephrectomy procedure (DOS 9/30/22)    Records/imaging/labs/orders: all records available    Pt called: no need for a call    At Rooming: have pt empty bladder/pvr,  Standard rooming      Joe Sung  4/9/2024  10:59 AM

## 2024-04-10 ENCOUNTER — ANCILLARY PROCEDURE (OUTPATIENT)
Dept: CT IMAGING | Facility: CLINIC | Age: 44
End: 2024-04-10
Attending: UROLOGY
Payer: COMMERCIAL

## 2024-04-10 ENCOUNTER — LAB (OUTPATIENT)
Dept: LAB | Facility: CLINIC | Age: 44
End: 2024-04-10
Payer: COMMERCIAL

## 2024-04-10 ENCOUNTER — ANCILLARY PROCEDURE (OUTPATIENT)
Dept: GENERAL RADIOLOGY | Facility: CLINIC | Age: 44
End: 2024-04-10
Attending: UROLOGY
Payer: COMMERCIAL

## 2024-04-10 ENCOUNTER — OFFICE VISIT (OUTPATIENT)
Dept: UROLOGY | Facility: CLINIC | Age: 44
End: 2024-04-10
Payer: COMMERCIAL

## 2024-04-10 VITALS
DIASTOLIC BLOOD PRESSURE: 87 MMHG | SYSTOLIC BLOOD PRESSURE: 134 MMHG | WEIGHT: 202 LBS | HEIGHT: 68 IN | HEART RATE: 99 BPM | BODY MASS INDEX: 30.62 KG/M2

## 2024-04-10 DIAGNOSIS — C64.2 MALIGNANT NEOPLASM OF KIDNEY EXCLUDING RENAL PELVIS, LEFT (H): ICD-10-CM

## 2024-04-10 DIAGNOSIS — Z90.5 S/P NEPHRECTOMY: ICD-10-CM

## 2024-04-10 DIAGNOSIS — Z85.528 HISTORY OF KIDNEY CANCER: Primary | ICD-10-CM

## 2024-04-10 LAB
ALBUMIN SERPL BCG-MCNC: 4.5 G/DL (ref 3.5–5.2)
ALP SERPL-CCNC: 63 U/L (ref 40–150)
ALT SERPL W P-5'-P-CCNC: 15 U/L (ref 0–50)
ANION GAP SERPL CALCULATED.3IONS-SCNC: 15 MMOL/L (ref 7–15)
AST SERPL W P-5'-P-CCNC: 10 U/L (ref 0–45)
BILIRUB SERPL-MCNC: 0.4 MG/DL
BUN SERPL-MCNC: 21.9 MG/DL (ref 6–20)
CALCIUM SERPL-MCNC: 9.4 MG/DL (ref 8.6–10)
CHLORIDE SERPL-SCNC: 102 MMOL/L (ref 98–107)
CREAT BLD-MCNC: 1.2 MG/DL (ref 0.5–1)
CREAT SERPL-MCNC: 1.12 MG/DL (ref 0.51–0.95)
DEPRECATED HCO3 PLAS-SCNC: 20 MMOL/L (ref 22–29)
EGFRCR SERPLBLD CKD-EPI 2021: 57 ML/MIN/1.73M2
EGFRCR SERPLBLD CKD-EPI 2021: 62 ML/MIN/1.73M2
GLUCOSE SERPL-MCNC: 91 MG/DL (ref 70–99)
POTASSIUM SERPL-SCNC: 4.6 MMOL/L (ref 3.4–5.3)
PROT SERPL-MCNC: 8.4 G/DL (ref 6.4–8.3)
SODIUM SERPL-SCNC: 137 MMOL/L (ref 135–145)

## 2024-04-10 PROCEDURE — 71046 X-RAY EXAM CHEST 2 VIEWS: CPT | Mod: GC | Performed by: RADIOLOGY

## 2024-04-10 PROCEDURE — 82565 ASSAY OF CREATININE: CPT | Performed by: PATHOLOGY

## 2024-04-10 PROCEDURE — 36415 COLL VENOUS BLD VENIPUNCTURE: CPT | Performed by: PATHOLOGY

## 2024-04-10 PROCEDURE — 80053 COMPREHEN METABOLIC PANEL: CPT | Performed by: PATHOLOGY

## 2024-04-10 PROCEDURE — 74178 CT ABD&PLV WO CNTR FLWD CNTR: CPT | Mod: GC | Performed by: RADIOLOGY

## 2024-04-10 PROCEDURE — 99213 OFFICE O/P EST LOW 20 MIN: CPT | Performed by: STUDENT IN AN ORGANIZED HEALTH CARE EDUCATION/TRAINING PROGRAM

## 2024-04-10 RX ORDER — IOPAMIDOL 755 MG/ML
98 INJECTION, SOLUTION INTRAVASCULAR ONCE
Status: COMPLETED | OUTPATIENT
Start: 2024-04-10 | End: 2024-04-10

## 2024-04-10 RX ADMIN — IOPAMIDOL 98 ML: 755 INJECTION, SOLUTION INTRAVASCULAR at 12:10

## 2024-04-10 ASSESSMENT — PAIN SCALES - GENERAL: PAINLEVEL: NO PAIN (0)

## 2024-04-10 NOTE — LETTER
4/10/2024       RE: Caroline Pride  [address]  [place] [facility] [address]     Dear Colleague,    Thank you for referring your patient, Caroline Pride, to the [facility] [place] at [facility]. Please see a copy of my visit note below.    Subjective    REASON FOR VISIT  RCC follow-up    HISTORY OF PRESENT ILLNESS  Ms. Kareem Pride is a pleasant 43 year old female who presents today in follow-up for her history of clear-cell renal cell carcinoma status post left radical nephrectomy on 9/30/2022, final pathology showing pT1a grade group 2 ccRCC.  Since his surgery, she has followed up with her annual imaging and lab work, and presents today for the same.    Today:  Some concerns regarding the bump in her kidney function  Notes that she has just finished an 80-hour fast this morning    Objective    PHYSICAL EXAMINATION  General: Alert, oriented, no acute distress    LABS      Latest Reference Range & Units 04/10/24 11:59   Sodium 135 - 145 mmol/L 137   Potassium 3.4 - 5.3 mmol/L 4.6   Chloride 98 - 107 mmol/L 102   Carbon Dioxide (CO2) 22 - 29 mmol/L 20 (L)   Urea Nitrogen 6.0 - 20.0 mg/dL 21.9 (H)   Creatinine 0.51 - 0.95 mg/dL 1.12 (H)   GFR Estimate >60 mL/min/1.73m2 62   Calcium 8.6 - 10.0 mg/dL 9.4   Anion Gap 7 - 15 mmol/L 15   Albumin 3.5 - 5.2 g/dL 4.5   Protein Total 6.4 - 8.3 g/dL 8.4 (H)   Alkaline Phosphatase 40 - 150 U/L 63   ALT 0 - 50 U/L 15   AST 0 - 45 U/L 10   Bilirubin Total <=1.2 mg/dL 0.4   (L): Data is abnormally low  (H): Data is abnormally high    IMAGING  I personally reviewed and interpreted the CT abdomen with and without IV contrast which did not show any evidence of new or recurrent disease to my review.  Will wait for radiology review for final evaluation.  Also reviewed the chest x-ray and agree that there is no evidence of metastatic disease    Narrative & Impression   Exam: XR CHEST 2 VIEWS,  4/10/2024 11:54 AM     Indication: Malignant neoplasm of kidney excluding renal pelvis, left  (H)     Comparison: 4/12/2023     Findings:   2 views of the chest. Heart size within normal limits. No  pneumothorax, consolidation, or pleural effusion. Vascularity is  distinct. Unchanged chronic left mid clavicular deformity.                                                                      Impression: Negative chest.     I have personally reviewed the examination and initial interpretation  and I agree with the findings.     VIVIEN SIMMONS MD      Assessment  History of ccRCC s/p radical nephrectomy in Sept of 2022    It was my pleasure to meet with Caroline in follow-up for her history of clear-cell renal cell carcinoma status post radical nephrectomy in September 2022 with no evidence of recurrent disease up to this point.  I was first happy to let her know that on my review of her CT abdomen with and without IV contrast, I did not see any evidence of recurrence or new disease.  We will wait on the official radiology read for the true all clear.  Her chest x-ray however I agree was also clear of any evidence of metastatic disease.    We then spent some time reviewing her lab work, and I reassured her that her abnormalities at this time are most likely due to her recently ended 80-hour fast.  Her kidney function now that she only has 1 kidney is slightly more susceptible to changes in her hydration and chances are she was just not very hydrated prior to her lab work.  We did discuss possibly repeating lab work sooner, but with this explanation she felt comfortable waiting for the full year to get repeat lab work and imaging.  In that regard, given her low risk for recurrent or metastatic disease, AUA recommendations are to get a repeat CT abdomen with and without IV contrast, chest x-ray, and lab work in 1 year.  I will follow-up with her in person or virtually shortly after these tests.    Ms. Kareem Pride  expressed understanding and agreement to the above discussion and plan and all of her questions were answered to her satisfaction.     PLAN  Repeat CT abdomen with and without IV contrast, chest x-ray, and BMP in 1 year with follow-up office or virtual visit with me shortly afterwards    SIGNED:    John Javier PA-C    I spent a total of 22 minutes spent on the date of the encounter doing chart review, history and exam, documentation, and further activities as noted above.

## 2024-04-10 NOTE — DISCHARGE INSTRUCTIONS

## 2024-04-10 NOTE — NURSING NOTE
"Chief Complaint   Patient presents with    RECHECK     annual follow up with Imaging and Labs       Blood pressure 134/87, pulse 99, height 1.727 m (5' 8\"), weight 91.6 kg (202 lb), not currently breastfeeding. Body mass index is 30.71 kg/m .    Patient Active Problem List   Diagnosis    Other procreative management counseling and advice    Generalized anxiety disorder    Subclinical hypothyroidism    Vitamin D deficiency    Need for Tdap vaccination    AMA (advanced maternal age) primigravida 35+, first trimester    Labor and delivery, indication for care    Alcohol consumption binge drinking    History of PCOS    Irregular periods/menstrual cycles    Non-celiac gluten sensitivity    Perforated appendicitis    Tinea versicolor    Fatigue, unspecified type    Anemia, unspecified type    Hx of appendicitis    Left renal mass       No Known Allergies    Current Outpatient Medications   Medication Sig Dispense Refill    valACYclovir (VALTREX) 1000 mg tablet Take 2 tablets (2,000 mg) by mouth 2 times daily 12 tablet 6    valACYclovir (VALTREX) 1000 mg tablet Take 2 tablets (2,000 mg) by mouth 2 times daily (Patient not taking: Reported on 4/10/2024) 4 tablet 1       Social History     Tobacco Use    Smoking status: Former     Current packs/day: 0.00     Average packs/day: 0.5 packs/day for 2.0 years (1.0 ttl pk-yrs)     Types: Cigarettes     Start date: 2017     Quit date: 2019     Years since quittin.2    Smokeless tobacco: Never   Substance Use Topics    Alcohol use: Yes     Comment: occasional    Drug use: No       Cody Arreola MA  4/10/2024  12:52 PM     "

## 2024-07-23 ENCOUNTER — OFFICE VISIT (OUTPATIENT)
Dept: DERMATOLOGY | Facility: CLINIC | Age: 44
End: 2024-07-23
Payer: COMMERCIAL

## 2024-07-23 DIAGNOSIS — L81.4 SOLAR LENTIGO: ICD-10-CM

## 2024-07-23 DIAGNOSIS — I83.91 VARICOSE VEINS OF RIGHT LOWER EXTREMITY, UNSPECIFIED WHETHER COMPLICATED: ICD-10-CM

## 2024-07-23 DIAGNOSIS — B00.1 COLD SORE: ICD-10-CM

## 2024-07-23 DIAGNOSIS — Z12.83 SKIN CANCER SCREENING: Primary | ICD-10-CM

## 2024-07-23 DIAGNOSIS — D18.01 CHERRY ANGIOMA: ICD-10-CM

## 2024-07-23 DIAGNOSIS — L82.1 SEBORRHEIC KERATOSIS: ICD-10-CM

## 2024-07-23 PROCEDURE — 99213 OFFICE O/P EST LOW 20 MIN: CPT | Mod: GC | Performed by: STUDENT IN AN ORGANIZED HEALTH CARE EDUCATION/TRAINING PROGRAM

## 2024-07-23 RX ORDER — VALACYCLOVIR HYDROCHLORIDE 1 G/1
2000 TABLET, FILM COATED ORAL 2 TIMES DAILY
Qty: 12 TABLET | Refills: 6 | Status: SHIPPED | OUTPATIENT
Start: 2024-07-23

## 2024-07-23 NOTE — PROGRESS NOTES
[facility] Dermatology Note    Encounter Date: Jul 23, 2024    Dermatology Problem List:  # FBSE 07/23/24   - Benign findings: benign melanocytic nevi, cherry angiomas, solar lentigines, seborrheic keratosis, sebaceous hyperplasia     # Hx OF NMSC  - s&nBCC central back  11/14/22  - [facility] posterior neck 01/31/23    # Hx of cold sores   - Valtrex refilled     # Benign biopsies:   - Pyogenic granuloma, right thigh, s/p shave 8/24/23   ______________________________________    Impression/Plan:    # FBSE 07/23/24   - Benign findings: benign melanocytic nevi, cherry angiomas, solar lentigines, seborrheic keratosis, sebaceous hyperplasia   The nature of sun-induced photo-aging and skin cancers is discussed.  Sun avoidance, protective clothing, and the use of 30-SPF sunscreens is advised. Observe closely for skin damage/changes, and call if such occurs.   - reassured patient regarding the benign nature of these lesions.  - reviewed sun protection measures (i.e. protective clothing and broad spectrum sunscreen).   - ABCDEs of melaoma reviewed      # Hx of cold sores   - Valtrex refilled     # Hx OF NMSC  - s&nBCC central back  11/14/22  - [facility] posterior neck 01/31/23  - NERD     Follow-up 12 months, sooner with any new concerns.       Staff and Resident Involved:  Ata Adrian MD and Smiley Mcclendon DO     CC:   Chief Complaint   Patient presents with    Skin Check     FBSE. She reports no specific spots of concern.        History of Present Illness:  Ms. Caroline Pride is a 43 year old female who presents as a return patient for FBSE.     - denies any lesions of concern, recently got back from [employer] camp with her kids, has a cold sore flare but is otherwise been in her normal state of health, no new concerns     - uses sunscreen and wears sun protective clothing: yes- tries to but has a hard time with re-application when outside    - tanning bed use: NA    - history of skin cancer: yes - as  above   - family history of melanoma: no     Labs:  NA    Physical exam:  Vitals: There were no vitals taken for this visit.  GEN: well developed, well-nourished, in no acute distress, in a pleasant mood.     SKIN: Chaney phototype 1  - Sun-exposed skin, which includes the head/face, neck, upper chest, both arms, legs, digits, and/or nails was examined.   - well healed scar at site of previous excision  - Flat brown macules and patches in a sun exposed areas on face and extremities - concentrated on shoulders and upper back   - Pink vascular papule right thigh with collarette at the base  - No other lesions of concern on areas examined.     Past Medical History:   Past Medical History:   Diagnosis Date    Depressive disorder ongoing    PCOS (polycystic ovarian syndrome)     Subclinical hypothyroidism      Past Surgical History:   Procedure Laterality Date    DAVINCI NEPHRECTOMY Left 2022    Procedure: NEPHRECTOMY, ROBOT-ASSISTED;  Surgeon: Luis Fernando Sigala MD;  Location: [facility] OR    LAPAROSCOPIC APPENDECTOMY N/A 2022    Procedure: APPENDECTOMY, LAPAROSCOPIC;  Surgeon: Heron Montana DO;  Location: [facility] OR       Social History:   reports that she quit smoking about 5 years ago. Her smoking use included cigarettes. She started smoking about 7 years ago. She has a 1 pack-year smoking history. She has never used smokeless tobacco. She reports current alcohol use. She reports that she does not use drugs.    Family History:  Family History   Problem Relation Age of Onset    Thyroid Disease Mother     Anemia Mother     Depression Father     Anxiety Disorder Father     Heart Disease Father     Breast Cancer Maternal Grandmother          at 42y    Hypertension Sister     Anxiety Disorder Sister     Thyroid Disease Sister     Hypertension Sister     Anxiety Disorder Sister     Thyroid Disease Sister     Parkinsonism Maternal Uncle     Melanoma No family hx of     Skin Cancer No family hx of         Medications:  Current Outpatient Medications   Medication Sig Dispense Refill    valACYclovir (VALTREX) 1000 mg tablet Take 2 tablets (2,000 mg) by mouth 2 times daily 12 tablet 6    valACYclovir (VALTREX) 1000 mg tablet Take 2 tablets (2,000 mg) by mouth 2 times daily (Patient not taking: Reported on 4/10/2024) 4 tablet 1     No Known Allergies

## 2024-07-23 NOTE — LETTER
7/23/2024       RE: Caroline Pride  [address]  [facility] [address]     Dear Colleague,    Thank you for referring your patient, Caroline Pride, to the [facility] [place] at [facility]. Please see a copy of my visit note below.    [facility] Dermatology Note    Encounter Date: Jul 23, 2024    Dermatology Problem List:  # FBSE 07/23/24   - Benign findings: benign melanocytic nevi, cherry angiomas, solar lentigines, seborrheic keratosis, sebaceous hyperplasia     # Hx OF NMSC  - s&nBCC central back  11/14/22  - nBCC posterior neck 01/31/23    # Hx of cold sores   - Valtrex refilled     # Benign biopsies:   - Pyogenic granuloma, right thigh, s/p shave 8/24/23   ______________________________________    Impression/Plan:    # FBSE 07/23/24   - Benign findings: benign melanocytic nevi, cherry angiomas, solar lentigines, seborrheic keratosis, sebaceous hyperplasia   The nature of sun-induced photo-aging and skin cancers is discussed.  Sun avoidance, protective clothing, and the use of 30-SPF sunscreens is advised. Observe closely for skin damage/changes, and call if such occurs.   - reassured patient regarding the benign nature of these lesions.  - reviewed sun protection measures (i.e. protective clothing and broad spectrum sunscreen).   - ABCDEs of melaoma reviewed      # Hx of cold sores   - Valtrex refilled     # Hx OF NMSC  - s&nBCC central back  11/14/22  - nBCC posterior neck 01/31/23  - NERD     Follow-up 12 months, sooner with any new concerns.       Staff and Resident Involved:  Ata Adrian MD and Smiley Mcclendon DO     CC:   Chief Complaint   Patient presents with     Skin Check     FBSE. She reports no specific spots of concern.        History of Present Illness:  Ms. Caroline Pride is a 43 year old female who presents as a return patient for FBSE.     - denies any lesions of concern,  recently got back from [employer] with her kids, has a cold sore flare but is otherwise been in her normal state of health, no new concerns     - uses sunscreen and wears sun protective clothing: yes- tries to but has a hard time with re-application when outside    - tanning bed use: NA    - history of skin cancer: yes - as above   - family history of melanoma: no     Labs:  NA    Physical exam:  Vitals: There were no vitals taken for this visit.  GEN: well developed, well-nourished, in no acute distress, in a pleasant mood.     SKIN: Chaney phototype 1  - Sun-exposed skin, which includes the head/face, neck, upper chest, both arms, legs, digits, and/or nails was examined.   - well healed scar at site of previous excision  - Flat brown macules and patches in a sun exposed areas on face and extremities - concentrated on shoulders and upper back   - Pink vascular papule right thigh with collarette at the base  - No other lesions of concern on areas examined.     Past Medical History:   Past Medical History:   Diagnosis Date     Depressive disorder ongoing     PCOS (polycystic ovarian syndrome)      Subclinical hypothyroidism      Past Surgical History:   Procedure Laterality Date     DAVINCI NEPHRECTOMY Left 9/30/2022    Procedure: NEPHRECTOMY, ROBOT-ASSISTED;  Surgeon: Luis Fernando Sigala MD;  Location: [facility] OR     LAPAROSCOPIC APPENDECTOMY N/A 9/30/2022    Procedure: APPENDECTOMY, LAPAROSCOPIC;  Surgeon: Heron Montana DO;  Location:  OR       Social History:   reports that she quit smoking about 5 years ago. Her smoking use included cigarettes. She started smoking about 7 years ago. She has a 1 pack-year smoking history. She has never used smokeless tobacco. She reports current alcohol use. She reports that she does not use drugs.    Family History:  Family History   Problem Relation Age of Onset     Thyroid Disease Mother      Anemia Mother      Depression Father      Anxiety Disorder Father       Heart Disease Father      Breast Cancer Maternal Grandmother          at 42y     Hypertension Sister      Anxiety Disorder Sister      Thyroid Disease Sister      Hypertension Sister      Anxiety Disorder Sister      Thyroid Disease Sister      Parkinsonism Maternal Uncle      Melanoma No family hx of      Skin Cancer No family hx of        Medications:  Current Outpatient Medications   Medication Sig Dispense Refill     valACYclovir (VALTREX) 1000 mg tablet Take 2 tablets (2,000 mg) by mouth 2 times daily 12 tablet 6     valACYclovir (VALTREX) 1000 mg tablet Take 2 tablets (2,000 mg) by mouth 2 times daily (Patient not taking: Reported on 4/10/2024) 4 tablet 1     No Known Allergies              Attestation signed by Ata Adrian MD at 2024  2:42 PM:  I have personally examined this patient and agree with the resident doctor's documentation and plan of care. I have reviewed and amended the resident's note. The documentation accurately reflects my clinical observations, diagnoses, treatment and follow-up plans.     Ata Adrian MD  Dermatology Staff      Again, thank you for allowing me to participate in the care of your patient.      Sincerely,    Ata Adrian MD

## 2024-07-23 NOTE — PATIENT INSTRUCTIONS
Learning the ABCDEs or Melanomas / Self Skin checks    Even if you have carefully practiced sun safety all summer, it's important to continue being vigilant about your skin in fall, winter, and beyond. Throughout the year, you should examine your skin head-to-toe once a month, looking for any suspicious lesions. Self-exams can help you identify potential skin cancers early, when they can almost always be completely cured. As a general rule, to spot either melanomas or non-melanoma skin cancers (such as basal cell carcinoma and squamous cell carcinoma), take note of any new moles or growths, and any existing growths that begin to grow or change significantly in any other way.  Lesions that change, itch, bleed, or don't heal are also alarm signals. Physicians have developed two specific strategies for early recognition ofmelanoma, the deadliest form of skin cancer: the ABCDEs and the Ugly Duckling sign.      Moles, brown spots and growths on the skin are usually harmless -- but not always. Anyone who has more than 100 moles is at greater risk for melanoma. The first signs can appear in one or more atypical moles. That's why it's so important to get to know your skin very well and to recognize any changes in the moles on your body. Look for the ABCDE signs of melanoma, and if you see one or more, make an appointment with a physician immediately.    Common, benign moles look the same over time. Be on alert when moles start to evolve or change and see a doctor. Any change -- in size, shape, color, elevation, or another trait, or any new symptom such as bleeding, itching or crusting -- points to danger.    A - Asymmetry  This benign mole is not asymmetrical. If you draw a line through the middle, the two sides will match, meaning it is symmetrical. If you draw a line through this mole, the two halves will not match, meaning it is asymmetrical, a warning sign for melanoma.    B - Border  A benign mole has smooth, even  borders, unlike melanomas. The borders of an early melanoma tend to be uneven. The edges may be scalloped or notched.     C - Color  Most benign moles are all one color -- often a single shade of brown. Having a variety of colors is another warning signal. A number of different shades of brown, tan or black could appear. A melanoma may also become red, white or blue.      D - Diameter  Benign moles usually have a smaller diameter than malignant ones. Melanomas usually are larger in diameter than the eraser on your pencil tip (  inch or 6mm), but they may sometimes be smaller when first detected.      E - Evolution  Common, benign moles look the same over time. Be on the alert when a mole starts to evolve or change in any way. When a mole is evolving, see a doctor. Any change -- in size, shape, color, elevation, or another trait, or any new symptom such as bleeding, itching or crusting -- points to danger.    The Ugly Duckling Rule  This is a simplified method where you look for any moles that do not match the other moles you have. Even if some of your moles look a little funny we are less concerned if they match one another. We are looking for the outlier - the one that is darker, larger, etc. In A, there is one dominant mole pattern with slight variation in size. The outlier lesion is clearly darker and larger than all other moles. In B, the patient has two predominant mole patterns, one with larger nevi and one with small, darker nevi. The outlier lesion is small but lacks pigmentation. In C, the patient shows only one lesion on the back. If this lesion is changing, symptomatic, or deemed atypical, it should be removed.      Seborrheic keratoses - a mimicker of melanoma    Seborrheic keratosis (seb-o-REE-ik care-uh-TOE-sis) is a common skin growth. It may seem worrisome because it can look like a wart, pre-cancerous skin growth (actinic keratosis), or skin cancer. Despite their appearance, seborrheic keratoses are  "harmless.    Most people get these growths when they are middle aged or older. Because they begin at a later age and can have a flat, waxy or wart-like appearance, seborrheic keratoses are often called the  barnacles of aging  or \"wisdom spots\".    It s possible to have just one of these growths, but most people develop several. Seborrheic keratoses range in color from white to black; however, most are tan or brown. You can find these harmless growths anywhere on the skin, except the palms and soles. Most often, you ll see them on the chest, back, head, or neck. Seborrheic keratoses are not contagious.          Sun Protection      Sunscreen   What does \"broad spectrum mean\"?  Broad spectrum sunscreens protect against both UVA and UVB radiation. UVC is filtered out by the ozone layer.     What does SPF mean?   SPF stands for  Sun Protection Factor  and represents the ability to screen only UVB (burning) rays. UVB rays are mostly blocked in all sunscreens, but only those that contain titanium dioxide, zinc oxide, mexoryl or Parsol 1789 (avobenzone) block the UVA spectrum. Even though a sunscreen is labeled  UVA/UVB Protection  that is not entirely accurate because products that only partially protect against UVA can claim to protect against both UVA and UVB.     What SPF should I chose?   Aim to get a sunscreen that is at least sun protection factor (SPF) 30. SPF 15 provides about 92-93% coverage, SPF 30 about 95-97% coverage, and SPF 45 about 98% coverage. That is to say, SPF 30 is not twice as good as SPF 15. The reason why we recommend SPF 30 is because we are usually only putting on half the necessary amount of sunscreen to achieve the advertised protection. That means that it is very possible that your SPF 30 sunscreen is only providing you with SPF 15 coverage based on how much you are applying. SPF 15 (92-93% coverage) is the absolute minimal that we recommend. Similarly, the benefit of sunscreens with SPF " higher than 50 is that even if you put on less than the required amount, you are likely still getting good protection (ex: even if you apply only half the recommended amount of , it should still provide you with an SPF of 50).     How much should I apply?  If covering your whole body, you should be using 30 grams, or one ounce, which is how much is in one shot glass! That s a THICK layer! May times, you are only applying half the recommended amount, which means that you are only getting half the SPF (for example, you may be using SPF 30 but if you're only applying half the recommended amount, you're only getting SPF 15 protection.      When do I need to wear sunscreen?  Every day, rain or shine! Even on a rainy day or a day when you are only indoors, you are still being exposed harmful UV radiation from the sun. We usually recommend physical/mineral sunscreens (active ingredient is titanium dioxide or zinc oxide) as these ingredients have been around for many years, there is no concern of them being absorbed into the bloodstream, and they are coral reef friendly! However, the best sunscreen is the one that you will use everyday.     What about my kids?  Sunscreen is not recommended for infants under the age of 6 months. Use clothing, shade and sun avoidance for small infants. For kids older than 6 months, we recommend that you should use only mineral/physical sunscreens that have zinc oxide as the active ingredient. Sun-protective clothing and hats are also important for people of all ages.     Sun protective clothing and Resources   Coolibar (www.coolibar.com)  E-Health Records International End (www.OutTrippin.com)  Athleta (www.athleta.Xignite)  GOOD (www.Nimbus Cloud Apps.Xignite)  Carve Designs (Youcruit) - affordable  Skinz (uvskinz.com)    Long sleeve - Bryan Cool DRI UPF 50 or Forrest PFG UPF 50  Hoodie - Forrest PFG UPF 50  Swimshirt/Rash Guard - Danii UPF 50 (on Amazon)  Neck - Outdoor Research Ubertubes  (www.outdoorresearch.Bilende Technologies)      Do I need tinted sunscreen?  There is more and more research showing that visible light can also lead to discoloration (such as melasma). Tinted sunscreens (which contain iron oxides) protect against visible light as an added bonus.     What brands do you recommend?  Physical/Mineral Sunscreens (in no particular order)  Elta MD UV Physical Broad-Spectrum SPF 41 Sunscreen (Tinted, $33)  Skin Ceuticals Physical Fusion UV Defense SPF 50 (Tinted, $34)  Unsun Mineral Tinted Face Sunscreen (Tinted, with 2 shade ranges, $29)  It Cosmetics CC+ Cream with SPF 50+ (Tinted, can also double as foundation/coverage -- great range of shades, $40)  Biossance Squalane + Zinc Sheer Mineral Sunscreen SPF 30 PA +++ (goes on white then blends in, $30)  Cerave 100% Mineral Sunscreen SPF 50 Face (good for sensitive skin, $15)  La Roche Posay Anthelios Mineral Zinc Oxide Sunscreen SPF 50 ($35)  La Roche Posay Anthelios Mineral Tinted Sunscreen for Face SPF 50 ($35)  Think Sport Sunscreen (great for sports, though has more of a white cast, $20)  Think Baby Sunscreen (for kids, $21)  Color Science Sunforgettable Total Protection Brush On Shield SPF 50 (Multiple tints, $130)    Chemical Sunscreens  Leann Ortiz Weightless Protection SPF 30 ($48)  Liyah SPF Brightening Moisturizer ($30)  Urban Skin Complexion Protection Moisturizer SPF 30 ($20)  Total Defense + Repair Broad Spectrum SPF 34 ($68)  Clarins UV PLUS Anti-Pollution Sunscreen Multi-Protection Tint SPF 50 (Multiple tints, $45)  Neutrogena Healthy Skin Glow Sheers Tinted Moisturizer with SPF 20 (Multiple tints, $11)

## 2024-11-24 ENCOUNTER — HEALTH MAINTENANCE LETTER (OUTPATIENT)
Age: 44
End: 2024-11-24

## 2025-03-31 ENCOUNTER — PRE VISIT (OUTPATIENT)
Dept: UROLOGY | Facility: CLINIC | Age: 45
End: 2025-03-31
Payer: COMMERCIAL

## 2025-03-31 NOTE — TELEPHONE ENCOUNTER
Reason for visit: Return patient     Relevant information: hx of radical nephrectomy done on Sept 2022    Records/imaging/labs/orders: labs and imaging requested for 4/9/25    At Rooming:  Standard rooming      Joe Sung  3/31/2025  11:29 AM

## 2025-04-07 NOTE — PROGRESS NOTES
Subjective     REASON FOR VISIT  RCC follow-up    HISTORY OF PRESENT ILLNESS  Ms. Kareem rPide is a pleasant 44 year old female who presents today in follow-up for her history of clear-cell renal cell carcinoma status post left radical nephrectomy on 9/30/2022, final pathology showing pT1a grade group 2 ccRCC.  Since his surgery, she has followed up with her annual imaging and lab work, and presents today for the same.    Objective     PHYSICAL EXAMINATION  General: Alert, oriented, no acute distress    LABS   Latest Reference Range & Units 04/09/25 12:27   Sodium 135 - 145 mmol/L 135   Potassium 3.4 - 5.3 mmol/L 4.5   Chloride 98 - 107 mmol/L 102   Carbon Dioxide (CO2) 22 - 29 mmol/L 23   Urea Nitrogen 6.0 - 20.0 mg/dL 18.9   Creatinine 0.51 - 0.95 mg/dL 0.95   GFR Estimate >60 mL/min/1.73m2 75   Calcium 8.8 - 10.4 mg/dL 9.7   Anion Gap 7 - 15 mmol/L 10   Glucose 70 - 99 mg/dL 102 (H)   (H): Data is abnormally high    Lab Results   Component Value Date    CR 0.95 04/09/2025    CR 1.0 04/09/2025    CR 1.2 (H) 04/10/2024    CR 1.12 (H) 04/10/2024    CR 0.99 (H) 04/12/2023    CR 1.0 04/12/2023    CR 1.06 (H) 10/01/2022    CR 0.92 09/30/2022      IMAGING  I personally reviewed and interpreted the CT abdomen with and without IV contrast which did not show any evidence of new or recurrent disease to my review.  Will wait for radiology review for final evaluation.  Also reviewed the chest x-ray and agree that there is no evidence of metastatic disease    Narrative & Impression   Exam: XR CHEST 2 VIEWS, 4/9/2025 11:30 AM     Indication: Hx of RCC s/p radical nephrectomy - surveillance; S/p  nephrectomy; History of kidney cancer     Comparison: 4/10/2024     Findings:      Cardiomediastinal silhouette is stable. No discernible pneumothorax.  No significant pleural effusion. No confluent consolidation.                                                                      Impression: No confluent consolidation.     ESPERANZA MORALES  MD MAXINE      Assessment   History of ccRCC s/p left radical nephrectomy in Sept of 2022    It was my pleasure to meet with Caroline in follow-up for her history of clear-cell renal cell carcinoma status post left radical nephrectomy in September 2022 with no evidence of recurrent disease up to this point.  I was first happy to let her know that on my review of her CT abdomen with and without IV contrast, I did not see any evidence of recurrence or new disease.  We will wait on the official radiology read for the true all clear.  Her chest x-ray however I agree was also clear of any evidence of metastatic disease.    In regards to follow-up, given her low risk nature and history of radical nephrectomy, we discussed the potential benefits and drawbacks of proceeding with renal ultrasound testing versus repeat CT of her abdomen in the future.  After some discussion, we used shared decision making to decide on a renal ultrasound in the future versus a CT at least for next year alongside the chest x-ray and lab work.  We will likely alternate ultrasound and CT.    Ms. Kareem Pride expressed understanding and agreement to the above discussion and plan and all of her questions were answered to her satisfaction.     PLAN  Renal ultrasound, chest x-ray, and BMP in 1 year with follow-up office or virtual visit with me shortly afterwards    SIGNED:    John Javier PA-C    I spent a total of 15 minutes spent on the date of the encounter doing chart review, history and exam, documentation, and further activities as noted above.

## 2025-04-09 ENCOUNTER — LAB (OUTPATIENT)
Dept: LAB | Facility: CLINIC | Age: 45
End: 2025-04-09
Payer: COMMERCIAL

## 2025-04-09 ENCOUNTER — ANCILLARY PROCEDURE (OUTPATIENT)
Dept: GENERAL RADIOLOGY | Facility: CLINIC | Age: 45
End: 2025-04-09
Attending: STUDENT IN AN ORGANIZED HEALTH CARE EDUCATION/TRAINING PROGRAM
Payer: COMMERCIAL

## 2025-04-09 ENCOUNTER — ANCILLARY PROCEDURE (OUTPATIENT)
Dept: CT IMAGING | Facility: CLINIC | Age: 45
End: 2025-04-09
Attending: STUDENT IN AN ORGANIZED HEALTH CARE EDUCATION/TRAINING PROGRAM
Payer: COMMERCIAL

## 2025-04-09 ENCOUNTER — OFFICE VISIT (OUTPATIENT)
Dept: UROLOGY | Facility: CLINIC | Age: 45
End: 2025-04-09
Payer: COMMERCIAL

## 2025-04-09 VITALS
WEIGHT: 190 LBS | OXYGEN SATURATION: 100 % | DIASTOLIC BLOOD PRESSURE: 86 MMHG | SYSTOLIC BLOOD PRESSURE: 127 MMHG | HEART RATE: 89 BPM | HEIGHT: 68 IN | BODY MASS INDEX: 28.79 KG/M2

## 2025-04-09 DIAGNOSIS — Z90.5 S/P NEPHRECTOMY: ICD-10-CM

## 2025-04-09 DIAGNOSIS — Z85.528 HISTORY OF KIDNEY CANCER: ICD-10-CM

## 2025-04-09 DIAGNOSIS — C64.2 MALIGNANT NEOPLASM OF KIDNEY EXCLUDING RENAL PELVIS, LEFT (H): ICD-10-CM

## 2025-04-09 DIAGNOSIS — Z85.528 HISTORY OF KIDNEY CANCER: Primary | ICD-10-CM

## 2025-04-09 LAB
ANION GAP SERPL CALCULATED.3IONS-SCNC: 10 MMOL/L (ref 7–15)
BUN SERPL-MCNC: 18.9 MG/DL (ref 6–20)
CALCIUM SERPL-MCNC: 9.7 MG/DL (ref 8.8–10.4)
CHLORIDE SERPL-SCNC: 102 MMOL/L (ref 98–107)
CREAT BLD-MCNC: 1 MG/DL (ref 0.5–1)
CREAT SERPL-MCNC: 0.95 MG/DL (ref 0.51–0.95)
EGFRCR SERPLBLD CKD-EPI 2021: 75 ML/MIN/1.73M2
EGFRCR SERPLBLD CKD-EPI 2021: >60 ML/MIN/1.73M2
GLUCOSE SERPL-MCNC: 102 MG/DL (ref 70–99)
HCO3 SERPL-SCNC: 23 MMOL/L (ref 22–29)
POTASSIUM SERPL-SCNC: 4.5 MMOL/L (ref 3.4–5.3)
SODIUM SERPL-SCNC: 135 MMOL/L (ref 135–145)

## 2025-04-09 PROCEDURE — 71046 X-RAY EXAM CHEST 2 VIEWS: CPT | Performed by: STUDENT IN AN ORGANIZED HEALTH CARE EDUCATION/TRAINING PROGRAM

## 2025-04-09 PROCEDURE — 99213 OFFICE O/P EST LOW 20 MIN: CPT | Performed by: STUDENT IN AN ORGANIZED HEALTH CARE EDUCATION/TRAINING PROGRAM

## 2025-04-09 PROCEDURE — 3074F SYST BP LT 130 MM HG: CPT | Performed by: STUDENT IN AN ORGANIZED HEALTH CARE EDUCATION/TRAINING PROGRAM

## 2025-04-09 PROCEDURE — 74177 CT ABD & PELVIS W/CONTRAST: CPT | Mod: GC | Performed by: STUDENT IN AN ORGANIZED HEALTH CARE EDUCATION/TRAINING PROGRAM

## 2025-04-09 PROCEDURE — 3079F DIAST BP 80-89 MM HG: CPT | Performed by: STUDENT IN AN ORGANIZED HEALTH CARE EDUCATION/TRAINING PROGRAM

## 2025-04-09 PROCEDURE — 1126F AMNT PAIN NOTED NONE PRSNT: CPT | Performed by: STUDENT IN AN ORGANIZED HEALTH CARE EDUCATION/TRAINING PROGRAM

## 2025-04-09 RX ORDER — IOPAMIDOL 755 MG/ML
99 INJECTION, SOLUTION INTRAVASCULAR ONCE
Status: COMPLETED | OUTPATIENT
Start: 2025-04-09 | End: 2025-04-09

## 2025-04-09 RX ADMIN — IOPAMIDOL 99 ML: 755 INJECTION, SOLUTION INTRAVASCULAR at 11:38

## 2025-04-09 ASSESSMENT — PAIN SCALES - GENERAL: PAINLEVEL_OUTOF10: NO PAIN (0)

## 2025-04-09 NOTE — LETTER
4/9/2025       RE: Caroline Pride  [address]  [facility] [address]     Dear Colleague,    Thank you for referring your patient, Caroline Pride, to the [facility] [place] at [facility]. Please see a copy of my visit note below.    Subjective    REASON FOR VISIT  RCC follow-up    HISTORY OF PRESENT ILLNESS  Ms. Kareem Pride is a pleasant 44 year old female who presents today in follow-up for her history of clear-cell renal cell carcinoma status post left radical nephrectomy on 9/30/2022, final pathology showing pT1a grade group 2 ccRCC.  Since his surgery, she has followed up with her annual imaging and lab work, and presents today for the same.    Objective    PHYSICAL EXAMINATION  General: Alert, oriented, no acute distress    LABS   Latest Reference Range & Units 04/09/25 12:27   Sodium 135 - 145 mmol/L 135   Potassium 3.4 - 5.3 mmol/L 4.5   Chloride 98 - 107 mmol/L 102   Carbon Dioxide (CO2) 22 - 29 mmol/L 23   Urea Nitrogen 6.0 - 20.0 mg/dL 18.9   Creatinine 0.51 - 0.95 mg/dL 0.95   GFR Estimate >60 mL/min/1.73m2 75   Calcium 8.8 - 10.4 mg/dL 9.7   Anion Gap 7 - 15 mmol/L 10   Glucose 70 - 99 mg/dL 102 (H)   (H): Data is abnormally high    Lab Results   Component Value Date    CR 0.95 04/09/2025    CR 1.0 04/09/2025    CR 1.2 (H) 04/10/2024    CR 1.12 (H) 04/10/2024    CR 0.99 (H) 04/12/2023    CR 1.0 04/12/2023    CR 1.06 (H) 10/01/2022    CR 0.92 09/30/2022      IMAGING  I personally reviewed and interpreted the CT abdomen with and without IV contrast which did not show any evidence of new or recurrent disease to my review.  Will wait for radiology review for final evaluation.  Also reviewed the chest x-ray and agree that there is no evidence of metastatic disease    Narrative & Impression   Exam: XR CHEST 2 VIEWS, 4/9/2025 11:30 AM     Indication: Hx of RCC s/p radical nephrectomy - surveillance; S/p  nephrectomy; History  of kidney cancer     Comparison: 4/10/2024     Findings:      Cardiomediastinal silhouette is stable. No discernible pneumothorax.  No significant pleural effusion. No confluent consolidation.                                                                      Impression: No confluent consolidation.     ESPERANZA GOODMAN MD      Assessment  History of ccRCC s/p left radical nephrectomy in Sept of 2022    It was my pleasure to meet with Caroline in follow-up for her history of clear-cell renal cell carcinoma status post left radical nephrectomy in September 2022 with no evidence of recurrent disease up to this point.  I was first happy to let her know that on my review of her CT abdomen with and without IV contrast, I did not see any evidence of recurrence or new disease.  We will wait on the official radiology read for the true all clear.  Her chest x-ray however I agree was also clear of any evidence of metastatic disease.    In regards to follow-up, given her low risk nature and history of radical nephrectomy, we discussed the potential benefits and drawbacks of proceeding with renal ultrasound testing versus repeat CT of her abdomen in the future.  After some discussion, we used shared decision making to decide on a renal ultrasound in the future versus a CT at least for next year alongside the chest x-ray and lab work.  We will likely alternate ultrasound and CT.    Ms. Kareem Pride expressed understanding and agreement to the above discussion and plan and all of her questions were answered to her satisfaction.     PLAN  Renal ultrasound, chest x-ray, and BMP in 1 year with follow-up office or virtual visit with me shortly afterwards    SIGNED:    John Javier PA-C    I spent a total of 15 minutes spent on the date of the encounter doing chart review, history and exam, documentation, and further activities as noted above.       Again, thank you for allowing me to participate in the care of your patient.       Sincerely,    John Javier PA-C

## 2025-04-09 NOTE — DISCHARGE INSTRUCTIONS

## 2025-04-09 NOTE — NURSING NOTE
"Caroline Pride is a 44 year old female patient that presents today in clinic for the following:    Chief Complaint   Patient presents with    Follow Up       The patient's allergies and medications were reviewed as noted. A set of vitals were recorded as noted without incident. The patient does not have any other questions for the provider.    Blood pressure 127/86, pulse 89, height 1.727 m (5' 8\"), weight 86.2 kg (190 lb), SpO2 100%, not currently breastfeeding. Body mass index is 28.89 kg/m .    Patient Active Problem List   Diagnosis    Other procreative management counseling and advice    Generalized anxiety disorder    Subclinical hypothyroidism    Vitamin D deficiency    Need for Tdap vaccination    AMA (advanced maternal age) primigravida 35+, first trimester    Labor and delivery, indication for care    Alcohol consumption binge drinking    History of PCOS    Irregular periods/menstrual cycles    Non-celiac gluten sensitivity    Perforated appendicitis    Tinea versicolor    Fatigue, unspecified type    Anemia, unspecified type    Hx of appendicitis    Left renal mass       No Known Allergies    Current Outpatient Medications   Medication Sig Dispense Refill    valACYclovir (VALTREX) 1000 mg tablet Take 2 tablets (2,000 mg) by mouth 2 times daily 12 tablet 6    valACYclovir (VALTREX) 1000 mg tablet Take 2 tablets (2,000 mg) by mouth 2 times daily 4 tablet 1       Social History     Tobacco Use    Smoking status: Former     Current packs/day: 0.00     Average packs/day: 0.5 packs/day for 2.0 years (1.0 ttl pk-yrs)     Types: Cigarettes     Start date: 2017     Quit date: 2019     Years since quittin.2    Smokeless tobacco: Never   Substance Use Topics    Alcohol use: Yes     Comment: occasional    Drug use: No       Federico Navarro, EMT-P   2025  12:42 PM  "

## 2025-07-24 ENCOUNTER — OFFICE VISIT (OUTPATIENT)
Dept: DERMATOLOGY | Facility: CLINIC | Age: 45
End: 2025-07-24
Attending: STUDENT IN AN ORGANIZED HEALTH CARE EDUCATION/TRAINING PROGRAM
Payer: COMMERCIAL

## 2025-07-24 DIAGNOSIS — L57.8 ACTINIC SKIN DAMAGE: Primary | ICD-10-CM

## 2025-07-24 DIAGNOSIS — Z85.828 HISTORY OF BASAL CELL CARCINOMA: ICD-10-CM

## 2025-07-24 DIAGNOSIS — D22.9 MULTIPLE BENIGN NEVI: ICD-10-CM

## 2025-07-24 DIAGNOSIS — B00.1 COLD SORE: ICD-10-CM

## 2025-07-24 DIAGNOSIS — L81.4 LENTIGINES: ICD-10-CM

## 2025-07-24 ASSESSMENT — PAIN SCALES - GENERAL: PAINLEVEL_OUTOF10: NO PAIN (0)

## 2025-07-24 NOTE — NURSING NOTE
Dermatology Rooming Note    Caroline Pride's goals for this visit include:   Chief Complaint   Patient presents with    Skin Check     FBSE- She mentions one area of concern with that being her back.      Rambo Chawla, EMT  Clinic Support  [facility]     [phone]    Employed by [facility] [employer]

## 2025-07-24 NOTE — LETTER
7/24/2025       RE: Caroline Pride  [address]  [facility] [address]     Dear Colleague,    Thank you for referring your patient, Caroline Pride, to the [facility]MAT[facility] [place] at [facility]. Please see a copy of my visit note below.    [facility] Dermatology Note  Encounter Date: Jul 24, 2025    Dermatology Problem List:  # Modified upper body skin exam 07/24/25   - Benign findings: benign melanocytic nevi, cherry angiomas, solar lentigines, seborrheic keratosis, sebaceous hyperplasia   # Hx of NMSC  - s&nBCC central back  11/14/22  - nBCC posterior neck 01/31/23  # Hx of cold sores   - Valtrex refilled   # Benign biopsies:   - Pyogenic granuloma, right thigh, s/p shave 8/24/23    Impression/Plan:  # Modified upper body skin exam 07/24/25   - Benign findings: benign melanocytic nevi, cherry angiomas, solar lentigines, seborrheic keratosis, sebaceous hyperplasia   The nature of sun-induced photo-aging and skin cancers is discussed.  Sun avoidance, protective clothing, and the use of 30-SPF sunscreens is advised. Observe closely for skin damage/changes, and call if such occurs.   - reassured patient regarding the benign nature of these lesions.       # Hx of NMSC  - s&nBCC central back  11/14/22  - nBCC posterior neck 01/31/23  - NERD     Follow-up 1 year    Staff and Resident Involved:  Jo-Ann Zarate MD PGY-3  [facility]     Dr. ANDREA Adrian staffed the patient.    CC:   Chief Complaint   Patient presents with     Skin Check     FBSE- She mentions one area of concern with that being her back.        History of Present Illness:  Ms. Caroline Pride is a very pleasant 44 year old female who presents for a modified skin check of the back, face, and arms. She has no new concerns. Otherwise no new tender, bleeding, or non- healing lesion.      Physical exam:  Vitals: There were no vitals taken  for this visit.  SKIN: Focused skin exam of the back, arms, and face   - well healed scar at site of previous excision  - Flat brown macules and patches in a sun exposed areas on face and extremities - concentrated on shoulders and upper back     Past Medical History:   Patient Active Problem List   Diagnosis     Other procreative management counseling and advice     Generalized anxiety disorder     Subclinical hypothyroidism     Vitamin D deficiency     Need for Tdap vaccination     AMA (advanced maternal age) primigravida 35+, first trimester     Labor and delivery, indication for care     Alcohol consumption binge drinking     History of PCOS     Irregular periods/menstrual cycles     Non-celiac gluten sensitivity     Perforated appendicitis     Tinea versicolor     Fatigue, unspecified type     Anemia, unspecified type     Hx of appendicitis     Left renal mass     Past Medical History:   Diagnosis Date     Depressive disorder ongoing     PCOS (polycystic ovarian syndrome)      Subclinical hypothyroidism      Past Surgical History:   Procedure Laterality Date     DAVINCI NEPHRECTOMY Left 2022    Procedure: NEPHRECTOMY, ROBOT-ASSISTED;  Surgeon: Luis Fernando Sigala MD;  Location: [facility] OR     LAPAROSCOPIC APPENDECTOMY N/A 2022    Procedure: APPENDECTOMY, LAPAROSCOPIC;  Surgeon: Heron Montana DO;  Location:  OR       Social History:  Patient reports that she quit smoking about 6 years ago. Her smoking use included cigarettes. She started smoking about 8 years ago. She has a 1 pack-year smoking history. She has never used smokeless tobacco. She reports current alcohol use. She reports that she does not use drugs.    Family History:  Family History   Problem Relation Age of Onset     Thyroid Disease Mother      Anemia Mother      Depression Father      Anxiety Disorder Father      Heart Disease Father      Breast Cancer Maternal Grandmother          at 42y     Hypertension Sister       Anxiety Disorder Sister      Thyroid Disease Sister      Hypertension Sister      Anxiety Disorder Sister      Thyroid Disease Sister      Parkinsonism Maternal Uncle      Melanoma No family hx of      Skin Cancer No family hx of        Medications:  Current Outpatient Medications   Medication Sig Dispense Refill     valACYclovir (VALTREX) 1000 mg tablet Take 2 tablets (2,000 mg) by mouth 2 times daily 12 tablet 6     valACYclovir (VALTREX) 1000 mg tablet Take 2 tablets (2,000 mg) by mouth 2 times daily 4 tablet 1      No Known Allergies    CC Ata Adrian MD  [address] [place] [id number] on close of this encounter.     Attestation signed by Ata Adrian MD at 7/24/2025  4:51 PM:  I have personally examined this patient and agree with the resident doctor's documentation and plan of care. I have reviewed and amended the resident's note. The documentation accurately reflects my clinical observations, diagnoses, treatment and follow-up plans.     Ata Adrian MD  Dermatology Staff      Again, thank you for allowing me to participate in the care of your patient.      Sincerely,    Ata Adrian MD

## 2025-07-24 NOTE — PROGRESS NOTES
[facility] Dermatology Note  Encounter Date: Jul 24, 2025    Dermatology Problem List:  # Modified upper body skin exam 07/24/25   - Benign findings: benign melanocytic nevi, cherry angiomas, solar lentigines, seborrheic keratosis, sebaceous hyperplasia   # Hx of NMSC  - s&nBCC central back  11/14/22  - nBCC posterior neck 01/31/23  # Hx of cold sores   - Valtrex refilled   # Benign biopsies:   - Pyogenic granuloma, right thigh, s/p shave 8/24/23    Impression/Plan:  # Modified upper body skin exam 07/24/25   - Benign findings: benign melanocytic nevi, cherry angiomas, solar lentigines, seborrheic keratosis, sebaceous hyperplasia   The nature of sun-induced photo-aging and skin cancers is discussed.  Sun avoidance, protective clothing, and the use of 30-SPF sunscreens is advised. Observe closely for skin damage/changes, and call if such occurs.   - reassured patient regarding the benign nature of these lesions.       # Hx of NMSC  - s&nBCC central back  11/14/22  - nBCC posterior neck 01/31/23  - NERD     Follow-up 1 year    Staff and Resident Involved:  Jo-Ann Zarate MD PGY-3  [facility]     Dr. ANDREA Adrian staffed the patient.    CC:   Chief Complaint   Patient presents with    Skin Check     FBSE- She mentions one area of concern with that being her back.        History of Present Illness:  Ms. Caroline Pride is a very pleasant 44 year old female who presents for a modified skin check of the back, face, and arms. She has no new concerns. Otherwise no new tender, bleeding, or non- healing lesion.      Physical exam:  Vitals: There were no vitals taken for this visit.  SKIN: Focused skin exam of the back, arms, and face   - well healed scar at site of previous excision  - Flat brown macules and patches in a sun exposed areas on face and extremities - concentrated on shoulders and upper back     Past Medical History:   Patient Active Problem List   Diagnosis    Other  procreative management counseling and advice    Generalized anxiety disorder    Subclinical hypothyroidism    Vitamin D deficiency    Need for Tdap vaccination    AMA (advanced maternal age) primigravida 35+, first trimester    Labor and delivery, indication for care    Alcohol consumption binge drinking    History of PCOS    Irregular periods/menstrual cycles    Non-celiac gluten sensitivity    Perforated appendicitis    Tinea versicolor    Fatigue, unspecified type    Anemia, unspecified type    Hx of appendicitis    Left renal mass     Past Medical History:   Diagnosis Date    Depressive disorder ongoing    PCOS (polycystic ovarian syndrome)     Subclinical hypothyroidism      Past Surgical History:   Procedure Laterality Date    DAVINCI NEPHRECTOMY Left 2022    Procedure: NEPHRECTOMY, ROBOT-ASSISTED;  Surgeon: Luis Fernando Sigala MD;  Location: [facility] OR    LAPAROSCOPIC APPENDECTOMY N/A 2022    Procedure: APPENDECTOMY, LAPAROSCOPIC;  Surgeon: Heron Montana DO;  Location:  OR       Social History:  Patient reports that she quit smoking about 6 years ago. Her smoking use included cigarettes. She started smoking about 8 years ago. She has a 1 pack-year smoking history. She has never used smokeless tobacco. She reports current alcohol use. She reports that she does not use drugs.    Family History:  Family History   Problem Relation Age of Onset    Thyroid Disease Mother     Anemia Mother     Depression Father     Anxiety Disorder Father     Heart Disease Father     Breast Cancer Maternal Grandmother          at 42y    Hypertension Sister     Anxiety Disorder Sister     Thyroid Disease Sister     Hypertension Sister     Anxiety Disorder Sister     Thyroid Disease Sister     Parkinsonism Maternal Uncle     Melanoma No family hx of     Skin Cancer No family hx of        Medications:  Current Outpatient Medications   Medication Sig Dispense Refill    valACYclovir (VALTREX) 1000 mg tablet Take 2  tablets (2,000 mg) by mouth 2 times daily 12 tablet 6    valACYclovir (VALTREX) 1000 mg tablet Take 2 tablets (2,000 mg) by mouth 2 times daily 4 tablet 1      No Known Allergies    CC Ata Adrian MD  [address] [place] [address] on close of this encounter.

## 2025-07-27 RX ORDER — VALACYCLOVIR HYDROCHLORIDE 1 G/1
2000 TABLET, FILM COATED ORAL 2 TIMES DAILY
Qty: 12 TABLET | Refills: 6 | OUTPATIENT
Start: 2025-07-27

## 2025-08-06 ENCOUNTER — TELEPHONE (OUTPATIENT)
Dept: DERMATOLOGY | Facility: CLINIC | Age: 45
End: 2025-08-06
Payer: COMMERCIAL

## (undated) DEVICE — ESU LIGASURE MARYLAND LAPAROSCOPIC SLR/DVDR 5MMX37CM LF1937

## (undated) DEVICE — DAVINCI XI HANDPIECE ESU VESSEL SEALER 8MM EXT 480422

## (undated) DEVICE — GLOVE GAMMEX NEOPRENE ULTRA SZ 7.5 LF 8515

## (undated) DEVICE — DECANTER VIAL 2006S

## (undated) DEVICE — SU PDS II 1 TP-1 54" Z879G

## (undated) DEVICE — Device

## (undated) DEVICE — DAVINCI HOT SHEARS TIP COVER  400180

## (undated) DEVICE — STPL ENDO LINEAR CUT ECHELON GST 45MM COMPACT PCEE45A

## (undated) DEVICE — SYR 10ML FINGER CONTROL W/O NDL 309695

## (undated) DEVICE — PACK GOWN 3/PK DISP XL SBA32GPFCB

## (undated) DEVICE — GLOVE PROTEXIS MICRO 7.0  2D73PM70

## (undated) DEVICE — SU VICRYL 3-0 SH 27" UND J416H

## (undated) DEVICE — ESU GROUND PAD ADULT REM W/15' CORD E7507DB

## (undated) DEVICE — ENDO TROCAR CONMED AIRSEAL BLADELESS 12X120MM IAS12-120LP

## (undated) DEVICE — SOL WATER IRRIG 1000ML BOTTLE 2F7114

## (undated) DEVICE — DAVINCI XI ESU BIPOLAR 3MM ENDOWRIST FENESTRATED EXT 471205

## (undated) DEVICE — CLIP ENDO HEMO-LOC PURPLE LG 544240

## (undated) DEVICE — DAVINCI XI GRASPER PROGRASP 8MM EXT 471093

## (undated) DEVICE — ADH SKIN CLOSURE PREMIERPRO EXOFIN 1.0ML 3470

## (undated) DEVICE — PACK DAVINCI UROL

## (undated) DEVICE — DRAPE U SPLIT 74X120" 29440

## (undated) DEVICE — DAVINCI SEAL CANNULA AND STPL 12MM 470380

## (undated) DEVICE — DEVICE SUTURE PASSER 14GA WECK EFX EFXSP2

## (undated) DEVICE — BLADE CLIPPER SGL USE 9680

## (undated) DEVICE — SU ENDO POLYSORB 0 3" LOOP 21" EL-21-L

## (undated) DEVICE — SURGICEL HEMOSTAT 4X8" 1952

## (undated) DEVICE — SU VICRYL 0 TIE 54" J608H

## (undated) DEVICE — SU MONOCRYL 4-0 PS-2 27" UND Y426H

## (undated) DEVICE — ENDO APPLICATOR SURGIFLO PLASMA COBLATION MS1995

## (undated) DEVICE — SUCTION MANIFOLD NEPTUNE 2 SYS 4 PORT 0702-020-000

## (undated) DEVICE — ENDO POUCH UNIVERSAL RETRIEVAL SYSTEM INZII 12/15MM CD004

## (undated) DEVICE — DAVINCI XI SEAL UNIVERSAL 5-8MM 470361

## (undated) DEVICE — WIPES FOLEY CARE SURESTEP PROVON DFC100

## (undated) DEVICE — DAVINCI XI DRAPE ARM 470015

## (undated) DEVICE — LINEN TOWEL PACK X30 5481

## (undated) DEVICE — SYSTEM LAPAROVUE VISIBILITY LAPVUE10

## (undated) DEVICE — DRAPE SHEET REV FOLD 3/4 9349

## (undated) DEVICE — DRAPE IOBAN INCISE 13X13" 6640EZ

## (undated) DEVICE — SUCTION IRR STRYKERFLOW II W/TIP 250-070-520

## (undated) DEVICE — PREP CHLORAPREP 26ML TINTED HI-LITE ORANGE 930815

## (undated) DEVICE — RX SURGIFLO HEMOSTATIC MATRIX W/THROMBIN 8ML 2994

## (undated) DEVICE — DAVINCI XI DRAPE COLUMN 470341

## (undated) DEVICE — CATH TRAY FOLEY SURESTEP 16FR W/URNE MTR STLK LATEX A303316A

## (undated) DEVICE — NDL INSUFFLATION 13GA 120MM C2201

## (undated) DEVICE — DAVINCI XI MONOPOLAR SCISSORS HOT SHEARS 8MM 470179

## (undated) DEVICE — ENDO POUCH UNIVERSAL RETRIEVAL SYSTEM INZII 5MM CD003

## (undated) DEVICE — LINEN TOWEL PACK X6 WHITE 5487

## (undated) DEVICE — TUBING FILTER TRI-LUMEN AIRSEAL ASC-EVAC1

## (undated) RX ORDER — PROPOFOL 10 MG/ML
INJECTION, EMULSION INTRAVENOUS
Status: DISPENSED
Start: 2022-09-30

## (undated) RX ORDER — FENTANYL CITRATE 50 UG/ML
INJECTION, SOLUTION INTRAMUSCULAR; INTRAVENOUS
Status: DISPENSED
Start: 2022-09-30

## (undated) RX ORDER — DEXAMETHASONE SODIUM PHOSPHATE 4 MG/ML
INJECTION, SOLUTION INTRA-ARTICULAR; INTRALESIONAL; INTRAMUSCULAR; INTRAVENOUS; SOFT TISSUE
Status: DISPENSED
Start: 2022-09-30

## (undated) RX ORDER — OXYCODONE HYDROCHLORIDE 10 MG/1
TABLET ORAL
Status: DISPENSED
Start: 2022-09-30

## (undated) RX ORDER — ONDANSETRON 2 MG/ML
INJECTION INTRAMUSCULAR; INTRAVENOUS
Status: DISPENSED
Start: 2022-09-30

## (undated) RX ORDER — HEPARIN SODIUM 5000 [USP'U]/.5ML
INJECTION, SOLUTION INTRAVENOUS; SUBCUTANEOUS
Status: DISPENSED
Start: 2022-09-30

## (undated) RX ORDER — ACETAMINOPHEN 325 MG/1
TABLET ORAL
Status: DISPENSED
Start: 2022-09-30

## (undated) RX ORDER — BUPIVACAINE HYDROCHLORIDE 5 MG/ML
INJECTION, SOLUTION EPIDURAL; INTRACAUDAL
Status: DISPENSED
Start: 2022-09-30

## (undated) RX ORDER — CEFAZOLIN SODIUM/WATER 2 G/20 ML
SYRINGE (ML) INTRAVENOUS
Status: DISPENSED
Start: 2022-09-30

## (undated) RX ORDER — GABAPENTIN 300 MG/1
CAPSULE ORAL
Status: DISPENSED
Start: 2022-09-30

## (undated) RX ORDER — HYDROMORPHONE HYDROCHLORIDE 1 MG/ML
INJECTION, SOLUTION INTRAMUSCULAR; INTRAVENOUS; SUBCUTANEOUS
Status: DISPENSED
Start: 2022-09-30

## (undated) RX ORDER — LIDOCAINE HYDROCHLORIDE 20 MG/ML
INJECTION, SOLUTION EPIDURAL; INFILTRATION; INTRACAUDAL; PERINEURAL
Status: DISPENSED
Start: 2022-09-30